# Patient Record
Sex: MALE | Race: WHITE | NOT HISPANIC OR LATINO | Employment: FULL TIME | URBAN - METROPOLITAN AREA
[De-identification: names, ages, dates, MRNs, and addresses within clinical notes are randomized per-mention and may not be internally consistent; named-entity substitution may affect disease eponyms.]

---

## 2017-05-18 ENCOUNTER — TRANSFERRED RECORDS (OUTPATIENT)
Dept: HEALTH INFORMATION MANAGEMENT | Facility: CLINIC | Age: 62
End: 2017-05-18

## 2017-05-18 ENCOUNTER — RECORDS - HEALTHEAST (OUTPATIENT)
Dept: LAB | Facility: CLINIC | Age: 62
End: 2017-05-18

## 2017-05-18 LAB
CHOLEST SERPL-MCNC: 199 MG/DL
FASTING STATUS PATIENT QL REPORTED: ABNORMAL
HDLC SERPL-MCNC: 40 MG/DL
LDLC SERPL CALC-MCNC: 125 MG/DL
PSA SERPL-MCNC: 1.1 NG/ML (ref 0–4.5)
TRIGL SERPL-MCNC: 170 MG/DL

## 2017-07-06 ENCOUNTER — AMBULATORY - HEALTHEAST (OUTPATIENT)
Dept: PULMONOLOGY | Facility: OTHER | Age: 62
End: 2017-07-06

## 2017-07-06 DIAGNOSIS — F17.210 CIGARETTE NICOTINE DEPENDENCE, UNCOMPLICATED: ICD-10-CM

## 2017-07-14 ENCOUNTER — HOSPITAL ENCOUNTER (OUTPATIENT)
Dept: CT IMAGING | Facility: HOSPITAL | Age: 62
Discharge: HOME | End: 2017-07-14
Attending: INTERNAL MEDICINE

## 2017-07-14 ENCOUNTER — OFFICE VISIT - HEALTHEAST (OUTPATIENT)
Dept: PULMONOLOGY | Facility: OTHER | Age: 62
End: 2017-07-14

## 2017-07-14 DIAGNOSIS — H92.13 EAR DRAINAGE, BILATERAL: ICD-10-CM

## 2017-07-14 DIAGNOSIS — F17.210 CIGARETTE NICOTINE DEPENDENCE, UNCOMPLICATED: ICD-10-CM

## 2017-07-14 DIAGNOSIS — R63.4 WEIGHT LOSS: ICD-10-CM

## 2017-07-14 DIAGNOSIS — Z72.0 TOBACCO USE: ICD-10-CM

## 2017-07-18 ENCOUNTER — COMMUNICATION - HEALTHEAST (OUTPATIENT)
Dept: INTENSIVE CARE | Facility: CLINIC | Age: 62
End: 2017-07-18

## 2017-11-16 ENCOUNTER — OFFICE VISIT (OUTPATIENT)
Dept: FAMILY MEDICINE | Facility: CLINIC | Age: 62
End: 2017-11-16

## 2017-11-16 VITALS
BODY MASS INDEX: 19.88 KG/M2 | WEIGHT: 112.2 LBS | HEART RATE: 55 BPM | DIASTOLIC BLOOD PRESSURE: 58 MMHG | SYSTOLIC BLOOD PRESSURE: 98 MMHG | HEIGHT: 63 IN | TEMPERATURE: 97.4 F | OXYGEN SATURATION: 100 %

## 2017-11-16 DIAGNOSIS — L03.113 CELLULITIS OF RIGHT UPPER EXTREMITY: Primary | ICD-10-CM
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
## 2017-11-16 DIAGNOSIS — R63.4 WEIGHT LOSS: ICD-10-CM

## 2017-11-16 PROBLEM — L03.119 CELLULITIS OF HAND: Status: ACTIVE | Noted: 2017-11-11
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
## 2017-11-16 RX ORDER — SIMVASTATIN 20 MG
20 TABLET ORAL DAILY
COMMUNITY
End: 2018-02-07

## 2017-11-16 RX ORDER — METRONIDAZOLE 500 MG/1
500 TABLET ORAL 3 TIMES DAILY
COMMUNITY
Start: 2017-11-12 | End: 2017-11-22

## 2017-11-16 RX ORDER — SULFAMETHOXAZOLE/TRIMETHOPRIM 800-160 MG
1 TABLET ORAL 2 TIMES DAILY
COMMUNITY
Start: 2017-11-12 | End: 2017-11-22

## 2017-11-16 NOTE — PROGRESS NOTES
HPI:       Zafar Spencer is a 62 year old male with a significant past medical history of hyperlipidemia, marijuana and tobacco abuse, pulmonary nodule, emphysema and weight loss who presents for the new concern(s) of    Hospital Follow: Cellulitis of the Hand  Seen on 11/11 and admitted to [facility] for cellulitis of the right hand after he was bitten by his cat.  Initially was started on IV Rocephin and Flagyl with significant improvement.  Was discharged within 24 hours and started on p.o. metronidazole and Bactrim DS.  Has been compliant with medication and noted significant improvement in his right hand in regards to swelling and erythema.  Noted that it still is mildly tender to pressure over the dorsal aspect of his hand but nowhere else.  Has a history of scratches from his cat but no other bites.  Received a tetanus shot in 2012 thus did not get booster during his admission.  Has another week's worth of medication for both his metronidazole and Bactrim.  Since discharge, denied any fevers, chills, new rashes, weakness, numbness, changes in urination or bowel movements.    Weight Loss  Alcon and his partner are concerned about persistent weight loss over the last year.  He went from 155lbs about 1 year ago and currently is 111lbs without trying to lose weight.  Did endorse frequent night sweats but denies any fevers, nausea, vomiting, lightheadedness, dizziness, abdominal pain, changes in urination or bowel movement.  He was previously told that he had lung cancer in the past but never got treatment.  Concern due to persistent weight loss and night sweats that this could be worsening of his cancer.  He did note that he does not eat very well and has been more active with his part-time job as a  at [email or web address] that he started about 1 year ago.  States that he eats a small breakfast, no lunch due to work, and an average dinner.  Does not normally snack throughout the day but no  "significant changes to his diet.  No history of other cancer and no cancer that runs in the family.         PMHX:     Family history significant for diabetes and heart disease in the mother. No cancer hx in the family.    Current Outpatient Prescriptions   Medication Sig Dispense Refill     sulfamethoxazole-trimethoprim (BACTRIM DS/SEPTRA DS) 800-160 MG per tablet Take 1 tablet by mouth 2 times daily       simvastatin (ZOCOR) 20 MG tablet Take 20 mg by mouth daily       metroNIDAZOLE (FLAGYL) 500 MG tablet Take 500 mg by mouth 3 times daily       Social History     Social History     Marital status:      Spouse name: N/A     Number of children: N/A     Years of education: N/A     Occupational History           Social History Main Topics     Smoking status: Smoker, Current Status Unknown     Packs/day: 0.25     Years: 52.00     Types: Cigarettes     Smokeless tobacco: Never Used     Alcohol use No     Drug use: 2.00 per week     Special: Marijuana     Sexual activity: Not on file     Other Topics Concern     Not on file     Social History Narrative     No narrative on file        Allergies   Allergen Reactions     Latex Rash     Penicillins Rash     No results found for this or any previous visit (from the past 24 hour(s)).         Review of Systems:   12 Point review system negative except as stated in HPI          Physical Exam:     Vitals:    11/16/17 1520   BP: 98/58   Pulse: 55   Temp: 97.4  F (36.3  C)   TempSrc: Oral   SpO2: 100%   Weight: 112 lb 3.2 oz (50.9 kg)   Height: 5' 3\" (160 cm)     Body mass index is 19.88 kg/(m^2).    GENERAL APPEARANCE: healthy, alert and no distress,  NECK: no adenopathy, no asymmetry, masses, or scars and thyroid normal to palpation  RESP: lungs clear to auscultation - no rales, rhonchi or wheezes  CV: regular rate and rhythm,  and no murmur, click,  rub or gallop  ABDOMEN: soft, nontender, without hepatosplenomegaly or masses  MS: extremities normal- no gross " deformities noted  NEURO: Normal strength and tone, sensory exam grossly normal, mentation appears intact and speech normal  PSYCH: mood and affect normal/bright  SKIN: See below        Assessment and Plan     1. Cellulitis of right upper extremity/Cat bite, subsequent encounter  Significant improvement since hospitalization.  - Complete therapy with Metronidazole 500mg TID  - Complete therapy with Bactrim DS 800mg BID  - Follow up as needed    2. Weight loss  Poor nutrition vs Lung Neoplasm. Has document lung cancer in Care Everywhere but upon review, was assessed by Pulmonary and likely more infectious source which has resolved. No changes when reassessed with CT imaging. Will Obtain records from [facility] to help provide more evidence.  - Obtained old records  - Instructed and provided information about eating sources of food to help with weight gain and to eat more frequent (eat lunch)  - Follow up in 1 week     Options for treatment and follow-up care were reviewed with the patient and/or guardian. Zafar Spencer and/or guardian engaged in the decision making process and verbalized understanding of the options discussed and agreed with the final plan.    Alessio Baker MD  [facility]  Pager: [phone]    Precepted today with: Shameka Seth MD

## 2017-11-16 NOTE — MR AVS SNAPSHOT
"              After Visit Summary   11/16/2017    Zafar Spencer    MRN: [id number]           Patient Information     Date Of Birth          1955        Visit Information        Provider Department      11/16/2017 3:00 PM Alessio Baker MD [facility]        Care Instructions    - Continue with Metronidazole (antibiotic)  - Continue with Bactrim (antibiotics)  - Follow up in 1 week  - Work on diet            Follow-ups after your visit        Follow-up notes from your care team     Return in about 1 week (around 11/23/2017).      Your next 10 appointments already scheduled     Dec 04, 2017 10:00 AM CST   Return Visit with Gui Gracia MD   [facility] ([facility])    [address] [place] [address]   [phone]              Who to contact     Please call your clinic at [phone] to:    Ask questions about your health    Make or cancel appointments    Discuss your medicines    Learn about your test results    Speak to your doctor   If you have compliments or concerns about an experience at your clinic, or if you wish to file a complaint, please contact [facility] Patient Relations at [phone] or email us at [email or web address]         Additional Information About Your Visit        Care EveryWhere ID     This is your Care EveryWhere ID. This could be used by other organizations to access your [place] medical records  [id number]        Your Vitals Were     Pulse Temperature Height Pulse Oximetry BMI (Body Mass Index)       55 97.4  F (36.3  C) (Oral) 5' 3\" (160 cm) 100% 19.88 kg/m2        Blood Pressure from Last 3 Encounters:   11/16/17 98/58    Weight from Last 3 Encounters:   11/16/17 112 lb 3.2 oz (50.9 kg)              Today, you had the following     No orders found for display       Primary Care Provider Office Phone # Fax #    Alessio Baker [phone]       [address] " [facility] [address]        Equal Access to Services     [facility] SYLVESTER : Hadii aad ku hadkaterinkelley Harrisjayceeali, watonida luqadaha, qaybta kaalmamarc irene. So [facility] [phone].    ATENCIÓN: Si habla español, tiene a akbar disposición servicios gratuitos de asistencia lingüística. Llame al [phone].    We comply with applicable federal civil rights laws and [place] laws. We do not discriminate on the basis of race, color, national origin, age, disability, sex, sexual orientation, or gender identity.            Thank you!     Thank you for choosing [facility]  for your care. Our goal is always to provide you with excellent care. Hearing back from our patients is one way we can continue to improve our services. Please take a few minutes to complete the written survey that you may receive in the mail after your visit with us. Thank you!             Your Updated Medication List - Protect others around you: Learn how to safely use, store and throw away your medicines at www.disposemymeds.org.          This list is accurate as of: 11/16/17  5:37 PM.  Always use your most recent med list.                   Brand Name Dispense Instructions for use Diagnosis    metroNIDAZOLE 500 MG tablet    FLAGYL     Take 500 mg by mouth 3 times daily        simvastatin 20 MG tablet    ZOCOR     Take 20 mg by mouth daily        sulfamethoxazole-trimethoprim 800-160 MG per tablet    BACTRIM DS/SEPTRA DS     Take 1 tablet by mouth 2 times daily

## 2017-11-16 NOTE — PATIENT INSTRUCTIONS
- Continue with Metronidazole (antibiotic)  - Continue with Bactrim (antibiotics)  - Follow up in 1 week  - Work on diet

## 2017-11-21 ENCOUNTER — CARE COORDINATION (OUTPATIENT)
Dept: FAMILY MEDICINE | Facility: CLINIC | Age: 62
End: 2017-11-21

## 2017-11-21 NOTE — PROGRESS NOTES
Pt was wondering about results of bloodwork, however I don't see any having been done is that correct?  Routing to PCP    Pt is scheduled to see Geronimo on Dec 4th    [email or web address]

## 2017-11-27 NOTE — PROGRESS NOTES
Preceptor Attestation:  Patient's case reviewed and discussed with Alessio Baker MD. Patient seen and discussed with the resident.  I agree with assessment and plan of care.  Supervising Physician:  Shameka Seth MD  [facility]

## 2017-12-04 ENCOUNTER — OFFICE VISIT (OUTPATIENT)
Dept: FAMILY MEDICINE | Facility: CLINIC | Age: 62
End: 2017-12-04

## 2017-12-04 VITALS
HEART RATE: 59 BPM | HEIGHT: 63 IN | SYSTOLIC BLOOD PRESSURE: 90 MMHG | DIASTOLIC BLOOD PRESSURE: 60 MMHG | RESPIRATION RATE: 18 BRPM | OXYGEN SATURATION: 96 % | TEMPERATURE: 97.7 F | BODY MASS INDEX: 20.2 KG/M2 | WEIGHT: 114 LBS

## 2017-12-04 DIAGNOSIS — R39.14 BENIGN PROSTATIC HYPERPLASIA WITH INCOMPLETE BLADDER EMPTYING: Primary | ICD-10-CM
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
## 2017-12-04 DIAGNOSIS — N40.1 BENIGN PROSTATIC HYPERPLASIA WITH INCOMPLETE BLADDER EMPTYING: Primary | ICD-10-CM

## 2017-12-04 LAB
BILIRUBIN UR: NEGATIVE
BLOOD UR: NEGATIVE
GLUCOSE URINE: NEGATIVE
KETONES UR QL: NEGATIVE
LEUKOCYTE ESTERASE UR: NEGATIVE
NITRITE UR QL STRIP: NEGATIVE
PH UR STRIP: 7 [PH] (ref 5–7)
PROTEIN UR: NEGATIVE
SP GR UR STRIP: 1.02
UROBILINOGEN UR STRIP-ACNC: NORMAL

## 2017-12-04 RX ORDER — TAMSULOSIN HYDROCHLORIDE 0.4 MG/1
0.4 CAPSULE ORAL DAILY
Qty: 30 CAPSULE | Refills: 1 | Status: SHIPPED | OUTPATIENT
Start: 2017-12-04 | End: 2018-02-07

## 2017-12-04 NOTE — PROGRESS NOTES
Preceptor Attestation:  Patient's case reviewed and discussed with Gui Gracia MD Patient seen and discussed with the resident.. I agree with assessment and plan of care.  Supervising Physician:  Nigel Edward MD  [facility]

## 2017-12-04 NOTE — MR AVS SNAPSHOT
After Visit Summary   12/4/2017    Zafar Spencer    MRN: [id number]           Patient Information     Date Of Birth          1955        Visit Information        Provider Department      12/4/2017 10:00 AM Gui Gracia MD [facility]        Today's Diagnoses     Benign prostatic hyperplasia with incomplete bladder emptying    -  1      Care Instructions    Important Takeaway Points From This Visit:    Please see us again in 2-3 weeks to discuss your urinary symptoms    I have prescribed you Flomax to be taken once per day    We will call with your lab results      As always, please call with any questions or concerns. I look forward to seeing you again soon!    Take care,  Dr. Gracia    Your current medication list is printed. Please keep this with you - it is helpful to bring this current list to any other medical appointments. It can also be helpful if you ever go to the emergency room or hospital.    If you had lab testing today we will call you with the results. The phone number we will call with your results is # [phone] (home) . If this is not the best number please call our clinic and change the number.    If you need any refills, please call your pharmacy and they will contact us.    If you have any further concerns or wish to schedule another appointment, please call our office at [phone] during normal business hours (8-5, M-F).    If you have urgent medical questions that cannot wait, you may call [phone] at any time of day.    If you have a medical emergency, please call [phone].    Thank you for coming to [facility].              Follow-ups after your visit        Who to contact     Please call your clinic at [phone] to:    Ask questions about your health    Make or cancel appointments    Discuss your medicines    Learn about your test results    Speak to your doctor   If you have compliments or concerns about an experience at your  "clinic, or if you wish to file a complaint, please contact [facility] Physicians Patient Relations at [phone] or email us at [email or web address]         Additional Information About Your Visit        Care EveryWhere ID     This is your Care EveryWhere ID. This could be used by other organizations to access your [place] medical records  [id number]        Your Vitals Were     Pulse Temperature Respirations Height Pulse Oximetry BMI (Body Mass Index)    59 97.7  F (36.5  C) (Oral) 18 5' 3\" (160 cm) 96% 20.19 kg/m2       Blood Pressure from Last 3 Encounters:   12/04/17 90/60   11/16/17 98/58    Weight from Last 3 Encounters:   12/04/17 114 lb (51.7 kg)   11/16/17 112 lb 3.2 oz (50.9 kg)              We Performed the Following     Urinalysis([facility])          Today's Medication Changes          These changes are accurate as of: 12/4/17 10:52 AM.  If you have any questions, ask your nurse or doctor.               Start taking these medicines.        Dose/Directions    tamsulosin 0.4 MG capsule   Commonly known as:  FLOMAX   Used for:  Benign prostatic hyperplasia with incomplete bladder emptying   Started by:  Gui Gracia MD        Dose:  0.4 mg   Take 1 capsule (0.4 mg) by mouth daily   Quantity:  30 capsule   Refills:  1            Where to get your medicines      These medications were sent to [place] [address] [place] [address] [address]     Phone:  [phone]     tamsulosin 0.4 MG capsule                Primary Care Provider Office Phone # Fax #    Alessio Baker [phone]       [address] [address]        Equal Access to Services     LILIAN PHAN AH: Hadii amanda kennedyo Sochu, waaxda luqadaha, qaybta kaalmada adeegyada, marc land. So [facility] [phone].    ATENCIÓN: Si habla español, tiene a akbar disposición servicios " sophei de asistencia lingüística. Mal ferrell [phone].    We comply with applicable federal civil rights laws and [place] laws. We do not discriminate on the basis of race, color, national origin, age, disability, sex, sexual orientation, or gender identity.            Thank you!     Thank you for choosing [facility]  for your care. Our goal is always to provide you with excellent care. Hearing back from our patients is one way we can continue to improve our services. Please take a few minutes to complete the written survey that you may receive in the mail after your visit with us. Thank you!             Your Updated Medication List - Protect others around you: Learn how to safely use, store and throw away your medicines at www.disposemymeds.org.          This list is accurate as of: 12/4/17 10:52 AM.  Always use your most recent med list.                   Brand Name Dispense Instructions for use Diagnosis    simvastatin 20 MG tablet    ZOCOR     Take 20 mg by mouth daily        tamsulosin 0.4 MG capsule    FLOMAX    30 capsule    Take 1 capsule (0.4 mg) by mouth daily    Benign prostatic hyperplasia with incomplete bladder emptying

## 2017-12-04 NOTE — PATIENT INSTRUCTIONS
Important Takeaway Points From This Visit:    Please see us again in 2-3 weeks to discuss your urinary symptoms    I have prescribed you Flomax to be taken once per day    We will call with your lab results      As always, please call with any questions or concerns. I look forward to seeing you again soon!    Take care,  Dr. Gracia    Your current medication list is printed. Please keep this with you - it is helpful to bring this current list to any other medical appointments. It can also be helpful if you ever go to the emergency room or hospital.    If you had lab testing today we will call you with the results. The phone number we will call with your results is # [phone] (home) . If this is not the best number please call our clinic and change the number.    If you need any refills, please call your pharmacy and they will contact us.    If you have any further concerns or wish to schedule another appointment, please call our office at [phone] during normal business hours (8-5, M-F).    If you have urgent medical questions that cannot wait, you may call [phone] at any time of day.    If you have a medical emergency, please call [phone].    Thank you for coming to [facility].

## 2017-12-04 NOTE — PROGRESS NOTES
HPI:   Zafar Spencer is a 62 year old  male who presents to clinic today for follow-up of his cat bite of the right hand.  Patient denies symptoms of fever, chills, erythema, hand pain.  Patient has finished his antibiotic course and believes it is healed very well.    Additionally patient and long-term girlfriend would like to discuss his prostate.  He is having increased urinary frequency approximately every 2-3 hours, especially at night.  He believes he is unable to fully empty his bladder.  Additionally, he states that after 10-20 minutes of having the urge to urinate if he has not gone to use the restroom patient will have incontinence.  Patient has not had any difficulty initiating a stream.  Patient has not noticed any hematuria or dysuria.    Patient denies other symptoms.    Patient is an established patient of this clinic.         PMHX:   Active Problems List  Patient Active Problem List   Diagnosis     Tobacco use     Pulmonary nodules     Benign prostatic hyperplasia with urinary frequency     Active problem list reviewed and updated.    Current Medications  Current Outpatient Prescriptions   Medication Sig Dispense Refill     tamsulosin (FLOMAX) 0.4 MG capsule Take 1 capsule (0.4 mg) by mouth daily 30 capsule 1     simvastatin (ZOCOR) 20 MG tablet Take 20 mg by mouth daily       Medication list reviewed and updated.    Social History  Social History   Substance Use Topics     Smoking status: Smoker, Current     Packs/day: 0.25     Years: 52.00     Types: Cigarettes     Smokeless tobacco: Never Used     Alcohol use No     History   Drug Use     2.00 per week     Special: Marijuana     Allergies  Allergies   Allergen Reactions     Latex Rash     Penicillins Rash     Allergies and Medication Intolerances Updated         Physical Exam:     Vitals:    12/04/17 0959   BP: 90/60   BP Location: Right arm   Patient Position: Sitting   Cuff Size: Adult Regular   Pulse: 59   Resp: 18   Temp: 97.7  F (36.5  " C)   TempSrc: Oral   SpO2: 96%   Weight: 114 lb (51.7 kg)   Height: 5' 3\" (160 cm)     Body mass index is 20.19 kg/(m^2).    General: Thin male. Accompanied by girlfriend. Appears well and in no acute distress.  HEENT: Eyes grossly normal to inspection. Extraocular movements intact. Pupils equal, round, and reactive to light. Mucous membranes moist. No ulcers or lesions noted in the oropharynx.  Cardiovascular: Regular rate and rhythm, normal S1 and S2 without murmur. No extra heartsounds or friction rub. Radial pulses present and equal bilaterally.  Respiratory: Lungs clear to auscultation bilaterally. No wheezing or crackles. No prolonged expiration. Symmetrical chest rise.  Musculoskeletal: No gross extremity deformities. No peripheral edema. Normal muscle bulk. Right hand specially looks improved from previous without erythema, swelling, and is symmetrical to left hand.  GI/Rectal: Soft, non-tender abdomen. No hepatosplenomegaly. Normal active bowel sounds. No obvious external hemorrhoids. Prostate slightly enlarged, non-tender, and without palpable nodules.    Results for CLARENCE PAULINO DIANA (MRN [id number]) as of 12/6/2017 03:15   Ref. Range 12/4/2017 11:23   Glucose Urine Latest Ref Range: NEGATIVE  Negative   Bilirubin UR Latest Ref Range: NEGATIVE  Negative   Ketones Urine Latest Ref Range: NEGATIVE  Negative   Specific Gravity Urine Latest Ref Range: 1.005 - 1.030  1.020   pH Urine Latest Ref Range: 4.5 - 8.0  7.0   Protein UR Latest Ref Range: NEGATIVE  Negative   Urobilinogen mg/dL Latest Ref Range: 0.2 E.U./dL  0.2 E.U./dL   Nitrite Urine Latest Ref Range: NEGATIVE  Negative   Blood UR Latest Ref Range: NEGATIVE  Negative   Leukocyte Esterase UR Latest Ref Range: NEGATIVE  Negative       Assessment and Plan   1. Benign prostatic hyperplasia with incomplete bladder emptying: Risk factors for prostate cancer include smoking, age, and male sex. UA unremarkable. No palpable nodules on exam. Trial flomax and " follow-up in 2-3 weeks. Patient's blood pressure noted to be slightly low. Recommended light activity until he has tried the medication.  - tamsulosin (FLOMAX) 0.4 MG capsule; Take 1 capsule (0.4 mg) by mouth daily  Dispense: 30 capsule; Refill: 1  - Urinalysis([facility])    2. Cat bite, subsequent encounter: Finished antibiotic course. Appears resolved. Removed from problems list.    Options for treatment and follow-up care were reviewed with the patient and/or guardian. Zafar Spencer and/or guardian engaged in the decision making process and verbalized understanding of the options discussed and agreed with the final plan.    Gui Gracia MD  [facility]  Pager# [phone]    Precepted with: Nigel Edward MD      This chart is completed utilizing dictation software; typos and/or incorrect word substitutions may unintentionally occur.

## 2017-12-06 PROBLEM — N40.1 BENIGN PROSTATIC HYPERPLASIA WITH URINARY FREQUENCY: Status: ACTIVE | Noted: 2017-12-06
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
## 2017-12-06 PROBLEM — R35.0 BENIGN PROSTATIC HYPERPLASIA WITH URINARY FREQUENCY: Status: ACTIVE | Noted: 2017-12-06

## 2017-12-06 PROBLEM — R63.4 WEIGHT LOSS: Status: RESOLVED | Noted: 2017-07-14 | Resolved: 2017-12-06

## 2017-12-06 PROBLEM — L03.119 CELLULITIS OF HAND: Status: RESOLVED | Noted: 2017-11-11 | Resolved: 2017-12-06

## 2018-01-10 ENCOUNTER — OFFICE VISIT (OUTPATIENT)
Dept: FAMILY MEDICINE | Facility: CLINIC | Age: 63
End: 2018-01-10
Payer: COMMERCIAL

## 2018-01-10 VITALS
SYSTOLIC BLOOD PRESSURE: 90 MMHG | BODY MASS INDEX: 21.99 KG/M2 | HEIGHT: 60 IN | HEART RATE: 70 BPM | RESPIRATION RATE: 18 BRPM | DIASTOLIC BLOOD PRESSURE: 66 MMHG | OXYGEN SATURATION: 93 % | WEIGHT: 112 LBS | TEMPERATURE: 97.6 F

## 2018-01-10 DIAGNOSIS — J43.9 PULMONARY EMPHYSEMA, UNSPECIFIED EMPHYSEMA TYPE (H): ICD-10-CM

## 2018-01-10 DIAGNOSIS — R05.9 COUGH: Primary | ICD-10-CM

## 2018-01-10 RX ORDER — BENZONATATE 100 MG/1
100 CAPSULE ORAL 3 TIMES DAILY PRN
Qty: 30 CAPSULE | Refills: 1 | Status: SHIPPED | OUTPATIENT
Start: 2018-01-10 | End: 2018-07-13

## 2018-01-10 NOTE — PATIENT INSTRUCTIONS
Discharge Instructions for Emphysema  You have been diagnosed with emphysema. This is a lung disease that limits the flow of air in and out of your lungs, making breathing harder. Emphysema is most often caused by heavy, long-time cigarette smoking.  Home care    If you smoke, quit.    Join a stop-smoking program. There are even telephone, text message, and Internet programs.    Ask your healthcare provider about medicines or other methods to help you quit.    Ask family members to quit smoking as well.    Don t allow smoking in your home, in your car, or around you, especially if oxygen is in use.    Protect yourself from infection.    Wash your hands often. Keep your hands away from your face. Most germs are spread from your hands to your mouth.    Ask your provider about a yearly flu shot and pneumonia vaccines.    Avoid crowds, especially in the winter, when more people have colds and flu.    To stay healthy, exercise regularly, eat a balanced diet, and get enough sleep. You should:    Try to exercise at least 30 minutes on most days. Ask your healthcare provider about a pulmonary rehabilitation (rehab) program. Pulmonary rehab helps improve muscle strength, ability to exercise and do daily tasks.     Healthy eating means a lot of fruit and vegetables, 100% whole grain products, lean meats and fish, and low-fat dairy products, like yogurt and cheeses.    Most people need 8 hours of sleep every night.    Take your medicines exactly as directed. Don t skip doses.    If you use oxygen, make sure you use it correctly. That means the amount your use and the length of time you use it.    Try to stay away from those things that may affect your breathing, such as cold weather, high humidity, smoke, air pollution, dust, and allergens.    Unless your provider has told you otherwise, drink at least 8 glasses of fluid every day to keep mucus thin. Ask about other things that can help.    Ask your healthcare provider to  show you pursed-lip breathing to help decrease shortness of breath.  Follow-up care  Make all follow-up appointments as directed by our staff.   When to call your healthcare provider  Call your provider right away if you have any of the following:    Shortness of breath, wheezing, or coughing, especially if you have trouble catching your breath or talking    Increased mucus; yellow, green, bloody, or smelly mucus    Fever or chills    Tightness in your chest that does not go away with your normal medicines    An irregular heartbeat or a feeling that your heart is beating very fast    Swollen ankles    Trouble doing your usual activities   Date Last Reviewed: 5/1/2016 2000-2017 [employer]. [address] [place] [address], [place] [address]. All rights reserved. This information is not intended as a substitute for professional medical care. Always follow your healthcare professional's instructions.

## 2018-01-10 NOTE — MR AVS SNAPSHOT
"              After Visit Summary   1/10/2018    Zafar Spencer    MRN: [id number]           Patient Information     Date Of Birth          1955        Visit Information        Provider Department      1/10/2018 10:00 AM Erika Becker DO [facility]        Today's Diagnoses     Cough    -  1    Pulmonary emphysema, unspecified emphysema type (H)           Follow-ups after your visit        Who to contact     Please call your clinic at [phone] to:    Ask questions about your health    Make or cancel appointments    Discuss your medicines    Learn about your test results    Speak to your doctor   If you have compliments or concerns about an experience at your clinic, or if you wish to file a complaint, please contact [facility] Patient Relations at [phone] or email us at [email or web address]         Additional Information About Your Visit        Care EveryWhere ID     This is your Care EveryWhere ID. This could be used by other organizations to access your [place] medical records  [id number]        Your Vitals Were     Pulse Temperature Respirations Height Pulse Oximetry BMI (Body Mass Index)    70 97.6  F (36.4  C) (Oral) 18 4' 10.2\" (147.8 cm) 93% 23.25 kg/m2       Blood Pressure from Last 3 Encounters:   01/10/18 90/66   12/04/17 90/60   11/16/17 98/58    Weight from Last 3 Encounters:   01/10/18 112 lb (50.8 kg)   12/04/17 114 lb (51.7 kg)   11/16/17 112 lb 3.2 oz (50.9 kg)              We Performed the Following     XR CHEST 2 VW          Today's Medication Changes          These changes are accurate as of: 1/10/18 10:28 AM.  If you have any questions, ask your nurse or doctor.               Start taking these medicines.        Dose/Directions    benzonatate 100 MG capsule   Commonly known as:  TESSALON   Used for:  Cough   Started by:  Erika Becker DO        Dose:  100 mg   Take 1 capsule (100 mg) by mouth 3 times daily as needed for cough "   Quantity:  30 capsule   Refills:  1            Where to get your medicines      These medications were sent to [facility] [address] [facility] - [address] [address]     Phone:  [phone]     benzonatate 100 MG capsule                Primary Care Provider Office Phone # Fax #    Alessio Migue Baker [phone]       [address] [address]        Equal Access to Services     LILIAN PHAN : Hadii aad ku hadasho Soomaali, waaxda luqadaha, qaybta kaalmada adeegyada, waxay idiin hayaan adeeg [place]. So [facility] [phone].    ATENCIÓN: Si habla español, tiene a akbar disposición servicios gratuitos de asistencia lingüística. [facility] [phone].    We comply with applicable federal civil rights laws and [place] laws. We do not discriminate on the basis of race, color, national origin, age, disability, sex, sexual orientation, or gender identity.            Thank you!     Thank you for choosing [facility]  for your care. Our goal is always to provide you with excellent care. Hearing back from our patients is one way we can continue to improve our services. Please take a few minutes to complete the written survey that you may receive in the mail after your visit with us. Thank you!             Your Updated Medication List - Protect others around you: Learn how to safely use, store and throw away your medicines at www.disposemymeds.org.          This list is accurate as of: 1/10/18 10:28 AM.  Always use your most recent med list.                   Brand Name Dispense Instructions for use Diagnosis    benzonatate 100 MG capsule    TESSALON    30 capsule    Take 1 capsule (100 mg) by mouth 3 times daily as needed for cough    Cough       simvastatin 20 MG tablet    ZOCOR     Take 20 mg by mouth daily        tamsulosin 0.4 MG capsule    FLOMAX    30 capsule    Take 1 capsule (0.4 mg) by mouth daily    Benign  prostatic hyperplasia with incomplete bladder emptying

## 2018-01-10 NOTE — PROGRESS NOTES
HPI:       Zafar Spencer is a 62 year old  male with a significant past medical history of chronic obstructive pulmonary disease who presents for the new concern(s) of    1. Cough with some productive sputum, 3 weeks of coughing,  he is a  smoker, there is some subjective weight loss, doesn't always eat well, sometimes does not have time to eat at work, no fevers, no chest pain, minimal shortness of breath    He did have a low-dose CT scan 7/2017 due to his smoking history and showed his emphysema    2. Urinary frequency: feels his symptoms have improved since starting Tamsulosin, he has been taking the medication daily with no adverse side effects. His blood pressure has been running lower, but this is normal fo rhim. He has no lightheadedness           PMHX:     Patient Active Problem List   Diagnosis     Tobacco use     Pulmonary nodules     Benign prostatic hyperplasia with urinary frequency       Current Outpatient Prescriptions   Medication Sig Dispense Refill     tamsulosin (FLOMAX) 0.4 MG capsule Take 1 capsule (0.4 mg) by mouth daily 30 capsule 1     simvastatin (ZOCOR) 20 MG tablet Take 20 mg by mouth daily         Social History     Social History     Marital status:      Spouse name: N/A     Number of children: N/A     Years of education: N/A     Occupational History           Social History Main Topics     Smoking status: Smoker, Current Status Unknown     Packs/day: 0.25     Years: 52.00     Types: Cigarettes     Smokeless tobacco: Never Used     Alcohol use No     Drug use: 2.00 per week     Special: Marijuana     Sexual activity: Not on file     Other Topics Concern     Not on file     Social History Narrative          Allergies   Allergen Reactions     Latex Rash     Penicillins Rash       No results found for this or any previous visit (from the past 24 hour(s)).         Review of Systems:   C: NEGATIVE for fatigue, but does feel may have had some weight loss  E: NEGATIVE  "for acute vision problems or changes  CV: NEGATIVE for chest pain, palpitations or new or worsening peripheral edema  P: NEGATIVE for changes in mood or affect          Physical Exam:     Vitals:    01/10/18 0952   BP: 90/66   Pulse: 70   Resp: 18   Temp: 97.6  F (36.4  C)   TempSrc: Oral   SpO2: 93%   Weight: 112 lb (50.8 kg)   Height: 4' 10.2\" (147.8 cm)     Body mass index is 23.25 kg/(m^2).    GENERAL APPEARANCE: healthy, alert and no distress,  HENT: ear canals and TM's normal and nose and mouth without ulcers or lesions  NECK: no adenopathy, no asymmetry, masses, or scars and thyroid normal to palpation  RESP: lungs clear to auscultation - no rales, rhonchi or wheezes  CV: regular rate and rhythm,  and no murmur, click,  rub or gallop  MS: extremities normal- no gross deformities noted      Assessment and Plan     1. Cough  No evidence of pneumonia, would be reasonable to go to work today  - XR CHEST 2 VW  - benzonatate (TESSALON) 100 MG capsule; Take 1 capsule (100 mg) by mouth 3 times daily as needed for cough  Dispense: 30 capsule; Refill: 1    2. Pulmonary emphysema, unspecified emphysema type (H)  Discussed quitting smoking        Options for treatment and follow-up care were reviewed with the patient and/or guardian. Zafar Spencer and/or guardian engaged in the decision making process and verbalized understanding of the options discussed and agreed with the final plan.    Erika Becker, DO          "

## 2018-02-07 DIAGNOSIS — N40.1 BENIGN PROSTATIC HYPERPLASIA WITH INCOMPLETE BLADDER EMPTYING: ICD-10-CM

## 2018-02-07 DIAGNOSIS — R39.14 BENIGN PROSTATIC HYPERPLASIA WITH INCOMPLETE BLADDER EMPTYING: ICD-10-CM

## 2018-02-07 DIAGNOSIS — Z00.00 HEALTHCARE MAINTENANCE: Primary | ICD-10-CM

## 2018-02-07 RX ORDER — TAMSULOSIN HYDROCHLORIDE 0.4 MG/1
0.4 CAPSULE ORAL DAILY
Qty: 90 CAPSULE | Refills: 3 | Status: SHIPPED | OUTPATIENT
Start: 2018-02-07 | End: 2018-07-13

## 2018-02-07 RX ORDER — SIMVASTATIN 20 MG
20 TABLET ORAL DAILY
Qty: 90 TABLET | Refills: 3 | Status: SHIPPED | OUTPATIENT
Start: 2018-02-07 | End: 2018-07-19

## 2018-04-26 ENCOUNTER — OFFICE VISIT (OUTPATIENT)
Dept: FAMILY MEDICINE | Facility: CLINIC | Age: 63
End: 2018-04-26
Payer: COMMERCIAL

## 2018-04-26 VITALS
OXYGEN SATURATION: 97 % | SYSTOLIC BLOOD PRESSURE: 100 MMHG | WEIGHT: 117.2 LBS | BODY MASS INDEX: 24.33 KG/M2 | HEART RATE: 57 BPM | TEMPERATURE: 97.8 F | DIASTOLIC BLOOD PRESSURE: 62 MMHG | RESPIRATION RATE: 16 BRPM

## 2018-04-26 DIAGNOSIS — J20.9 ACUTE BRONCHITIS, UNSPECIFIED ORGANISM: Primary | ICD-10-CM

## 2018-04-26 NOTE — MR AVS SNAPSHOT
After Visit Summary   4/26/2018    Zafar Spencer    MRN: [id number]           Patient Information     Date Of Birth          1955        Visit Information        Provider Department      4/26/2018 3:40 PM Palla, Misbah Yousuf, MD [facility]        Today's Diagnoses     Acute bronchitis, unspecified organism    -  1       Follow-ups after your visit        Who to contact     Please call your clinic at [phone] to:    Ask questions about your health    Make or cancel appointments    Discuss your medicines    Learn about your test results    Speak to your doctor            Additional Information About Your Visit        Care EveryWhere ID     This is your Care EveryWhere ID. This could be used by other organizations to access your [place] medical records  [id number]        Your Vitals Were     Pulse Temperature Respirations Pulse Oximetry BMI (Body Mass Index)       57 97.8  F (36.6  C) (Oral) 16 97% 24.33 kg/m2        Blood Pressure from Last 3 Encounters:   04/26/18 100/62   01/10/18 90/66   12/04/17 90/60    Weight from Last 3 Encounters:   04/26/18 117 lb 3.2 oz (53.2 kg)   01/10/18 112 lb (50.8 kg)   12/04/17 114 lb (51.7 kg)              Today, you had the following     No orders found for display       Primary Care Provider Office Phone # Fax #    Alessio Migue Baker [phone]       [place] [id number]        Equal Access to Services     LILIAN PHAN AH: Hadii aad ku hadasho Sojayceeali, waaxda luqadaha, qaybta kaalmada adeegyada, [place]. So [facility] [phone].    ATENCIÓN: Si habla español, tiene a akbar disposición servicios gratuitos de asistencia lingüística. Llame al [phone].    We comply with applicable federal civil rights laws and [place] laws. We do not discriminate on the basis of race, color, national origin, age, disability, sex, sexual orientation, or gender identity.             Thank you!     Thank you for choosing [facility]  for your care. Our goal is always to provide you with excellent care. Hearing back from our patients is one way we can continue to improve our services. Please take a few minutes to complete the written survey that you may receive in the mail after your visit with us. Thank you!             Your Updated Medication List - Protect others around you: Learn how to safely use, store and throw away your medicines at www.disposemymeds.org.          This list is accurate as of 4/26/18 11:59 PM.  Always use your most recent med list.                   Brand Name Dispense Instructions for use Diagnosis    benzonatate 100 MG capsule    TESSALON    30 capsule    Take 1 capsule (100 mg) by mouth 3 times daily as needed for cough    Cough       omeprazole 20 MG CR capsule    priLOSEC     Take 20 mg by mouth daily        simvastatin 20 MG tablet    ZOCOR    90 tablet    Take 1 tablet (20 mg) by mouth daily    Healthcare maintenance       tamsulosin 0.4 MG capsule    FLOMAX    90 capsule    Take 1 capsule (0.4 mg) by mouth daily    Benign prostatic hyperplasia with incomplete bladder emptying

## 2018-05-03 NOTE — PROGRESS NOTES
HPI    Zafar Spencer is 62 year old yo male presenting for:    1. Follow up on bronchitis  - was diagnosed with bronchitis 2 months ago  - today:   - symptoms resolved   - no cough, no sputum production, no chest congestion   - no fevers/chills/sweats   - no shortness of breath    OBJECTIVE    Vitals  /62  Pulse 57  Temp 97.8  F (36.6  C) (Oral)  Resp 16  Wt 117 lb 3.2 oz (53.2 kg)  SpO2 97%  BMI 24.33 kg/m2      Physical Exam  General: No acute distress  Ears: canals patent, TM wnl  CV: RRR  Respiratory: CTA bilaterally, no wheezes/rhonchi/rhales appreciated, no respiratory distress  Abdomen: soft, non-distended, non-tender, normoactive bowel sounds  Neuro: no focal deficits noted, reflexes within normal limits  Psych: appropriate affect    ASSESSMENT/PLAN    # Bronchitis  - resolved  - continue routine cares    # Pulmonary nodules  - CT from 2017 reviewed  - advised patient that his next scan is due 7/2018        Precepted with Dr. Ricardo     Preceptor Attestation:  I saw and evaluated the patient.  I reviewed the resident physician's history, exam, and treatment plan; and I agree with the documentation by the resident physician.  Supervising Physician:  Cesar Ricardo MD

## 2018-06-19 ENCOUNTER — COMMUNICATION - HEALTHEAST (OUTPATIENT)
Dept: PULMONOLOGY | Facility: OTHER | Age: 63
End: 2018-06-19

## 2018-06-21 ENCOUNTER — COMMUNICATION - HEALTHEAST (OUTPATIENT)
Dept: PULMONOLOGY | Facility: OTHER | Age: 63
End: 2018-06-21

## 2018-06-27 ENCOUNTER — TELEPHONE (OUTPATIENT)
Dept: FAMILY MEDICINE | Facility: CLINIC | Age: 63
End: 2018-06-27

## 2018-06-27 NOTE — TELEPHONE ENCOUNTER
Attempted to reach patient to follow up from recent ED visit for abdominal pain and emesis. LM for RC

## 2018-06-29 NOTE — TELEPHONE ENCOUNTER
Attempted to reach patient. Wife answered and states that he is not available but that he is feeling back to normal. Encouraged an appointment, they will call back to schedule.

## 2018-07-13 ENCOUNTER — OFFICE VISIT (OUTPATIENT)
Dept: FAMILY MEDICINE | Facility: CLINIC | Age: 63
End: 2018-07-13
Payer: COMMERCIAL

## 2018-07-13 VITALS
TEMPERATURE: 97.7 F | RESPIRATION RATE: 16 BRPM | WEIGHT: 112 LBS | SYSTOLIC BLOOD PRESSURE: 97 MMHG | HEART RATE: 52 BPM | DIASTOLIC BLOOD PRESSURE: 60 MMHG | BODY MASS INDEX: 19.84 KG/M2 | HEIGHT: 63 IN | OXYGEN SATURATION: 98 %

## 2018-07-13 DIAGNOSIS — Z12.12 SCREENING FOR MALIGNANT NEOPLASM OF THE RECTUM: ICD-10-CM

## 2018-07-13 DIAGNOSIS — J43.9 PULMONARY EMPHYSEMA, UNSPECIFIED EMPHYSEMA TYPE (H): Primary | ICD-10-CM

## 2018-07-13 DIAGNOSIS — Z11.4 SCREENING FOR HUMAN IMMUNODEFICIENCY VIRUS: ICD-10-CM

## 2018-07-13 DIAGNOSIS — Z72.0 TOBACCO USE: ICD-10-CM

## 2018-07-13 DIAGNOSIS — Z87.891 PERSONAL HISTORY OF TOBACCO USE: ICD-10-CM

## 2018-07-13 DIAGNOSIS — Z11.59 NEED FOR HEPATITIS C SCREENING TEST: ICD-10-CM

## 2018-07-13 DIAGNOSIS — H90.3 BILATERAL SENSORINEURAL HEARING LOSS: ICD-10-CM

## 2018-07-13 LAB
CHOLEST SERPL-MCNC: 199 MG/DL
FASTING?: NO
HDLC SERPL-MCNC: 48 MG/DL
HIV 1+2 AB+HIV1 P24 AG SERPL QL IA: NEGATIVE
LDLC SERPL CALC-MCNC: 132 MG/DL
TRIGL SERPL-MCNC: 95 MG/DL

## 2018-07-13 NOTE — PROGRESS NOTES
Preceptor Attestation:  Patient's case reviewed and discussed with  Patient seen and discussed with the resident..  I agree with written assessment and plan of care.  Supervising Physician:  Cheryl Jung MD  [facility]

## 2018-07-13 NOTE — PROGRESS NOTES
"History   Zafar Spencer is a 63 year old male presenting for:  RECHECK (bronchitis) and Medication Reconciliation (needs attention)    Bronchitis  - Cough is well controlled, no coughing since last visit  - Was rx'd Tessalon but no longer taking it.   - No trouble breathing or SOB. Some night-time wheezing reported from wife  - 1/2 ppd, THC use 3-4 times a week.  - CT scan every year. Due for one now    Tobacco Use Disorder  - Currently smoking 1/2 ppd  - Interested in quitting eventually, has tried patch, gum and Chantix. Did not like patch/gum, but reports Chantix worked well; later he reports it caused vomiting      Ear Infection  - Patient has had consistent ringing in ears and hearing loss over the past year. Patient reports intermittent numbness and \"ear swelling\".   - Patient's partner also reports brownish/green/yellow drainage that is apparent on pillow after night of sleep  - Ear is itchy, uses Q-tips and fingers to scratch  - Told he has dermatitis of ears, but was not given anything for it.   - Patient reports multiple ear infections as a child, did not have tubes placed    BPH  - Previously diagnosed with BPH, was prescribed Flomax   - No longer taking Flomax because it was causing lower back pain  - Patient denies any change in urine frequency, no urgency, no dysuria, no back pain    The patient speaks English, so no  was used for this visit.   Medical and social history and medications reviewed with patient.   Exam   BP 97/60 (BP Location: Right arm, Patient Position: Chair, Cuff Size: Adult Regular)  Pulse 52  Temp 97.7  F (36.5  C) (Oral)  Resp 16  Ht 5' 2.89\" (159.8 cm)  Wt 112 lb (50.8 kg)  SpO2 98%  BMI 19.91 kg/m2  Gen: NAD, slow somewhat slurred speech  HEENT: Missing many upper teeth; poor dental hygiene. Unable to visualize tympanic membranes due to uncommon middle ear anatomy. Grijalva test did not lateralize. Decreased Air conduction in both ears. Rinne test much louder bone " conduction than air conduction bilaterally  Card: RRR, no murmurs  Resp: clear, no wheezing or crackles  Medical Decision-Making     Pulmonary emphysema  A: Patient's symptoms have abated since last visit. Has had no cough, SOB or difficulty breathing overall. Patient's night-time wheezing is baseline according to partner, and can be explained by Emphysema.  P: Patient has had no symptoms of bronchitis. Counseled on smoking cessation (see below), instructed to follow up if chronic cough returns.  Encouraged continued follow up with Dr. Macedo.  Annual chest CT for lung cancer screening due to 52 PY smoking history in current smoker age 63.    Tobacco Use Disorder  A: Patient now smoking 1/2 ppd, currently seems to be in contemplative stage of behavioral change, has no specific plan to quit smoking. Has tried gum/patch and Chantix, but is uninterested in trying any of these again. Feels confident he can quit on his own.  P: Patient set goal of complete cessation of tobacco in 1 year. Patient uninterested in medical help at this point, but says he will follow up if he decides he needs help.     Bilateral sensorineural hearing loss  A: Patient has hearing loss on physical exam, with decreased air conductance bilaterally. Tympanic membrane was not able to be visualized b/c of patient's abnormal middle ear anatomy. Lack of fever, adenopathy, sore throat, rhinorrhea/congestion make recurrent ear infections less likely. However, report of brown/yellow liquid discharge raises suspicion for infectious process. Need further investigation into cause of hearing loss  P: Patient referred to ear specialist for management of hearing loss. Will f/u with patient at next visit to determine progress    BPH  A: Patient no longer having symptoms of BPH and prefers not to be on Flomax. No symptoms of back pain or weight loss to raise concern for prostate cancer, and weight has been stable.  P: Informed patient that if he continues to not  have symptoms of BPH, he does not need to take Flomax. Instructed patient to follow up if symptoms return for re-evaluation.     Health Maintenance:  A: Due for CT of chest and Colonoscopy. No HIV/HCV test on record. Lipid panel over a year old and unable to view prior lipid studies in care everywhere. Ordered lipids today to help assess ongoing need for statin and if he may also benefit from aspirin.  P: Referred for CT of chest  Referred for Colonoscopy  HCV, HIV tests and Lipid panel drawn in clinic.    Patient seen and note completed by Alex Kelly (MS3)    Brittany Yates MD, MPH  [facility] Resident     Precepted patient with Cheryl Jung MD    Options for treatment and follow-up care were reviewed with the patient and/or guardian. Zafar Spencer and/or guardian engaged in the decision making process and verbalized understanding of the options discussed and agreed with the final plan.      Lung Cancer Screening Shared Decision Making Visit     Zafar Spencer is eligible for lung cancer screening on the basis of the information provided in my signed lung cancer screening order.     I have discussed with patient the risks and benefits of screening for lung cancer with low-dose CT.     The risks include:   radiation exposure: one low dose chest CT has as much ionizing radiation as  about 15 chest x-rays or 6 months of background radiation living in [place]     false positives: 96% of positive findings/nodules are NOT cancer, but some  might still require additional diagnostic evaluation, including biopsy   over-diagnosis: some slow growing cancers that might never have been clinically  significant will be detected and treated unnecessarily     The benefit of early detection of lung cancer is contingent upon adherence to annual screening or more frequent follow up if indicated.     Furthermore, reaping the benefits of screening requires Zafar Spencer to be willing and physically able to undergo  diagnostic procedures, if indicated. Although no specific guide is available for determining severity of comorbidities, it is reasonable to withhold screening in patients who have greater mortality risk from other diseases.     We did discuss that the only way to prevent lung cancer is to not smoke. Smoking cessation assistance was offered.    I did not offer risk estimation using a calculator such as this one:    ShouldIScreen

## 2018-07-13 NOTE — PATIENT INSTRUCTIONS
We will let you know the results of blood tests next week.   Your cholesterol test will help us know if you would benefit from being on your statin or from taking an aspirin.   We will call you to set up your colonoscopy, hearing referral, and CT scan.       Lung Cancer Screening   Frequently Asked Questions  If you are at high-risk for lung cancer, getting screened with low-dose computed tomography (LDCT) every year can help save your life. This handout offers answers to some of the most common questions about lung cancer screening. If you have other questions, please call [phone] ([phone]).     What is it?  Lung cancer screening uses special X-ray technology to create an image of your lung tissue. The exam is quick and easy and takes less than 10 seconds. We don t give you any medicine or use any needles. You can eat before and after the exam. You don t need to change your clothes as long as the clothing on your chest doesn t contain metal. But, you do need to be able to hold your breath for at least 6 seconds during the exam.    What is the goal of lung cancer screening?  The goal of lung cancer screening is to save lives. Many times, lung cancer is not found until a person starts having physical symptoms. Lung cancer screening can help detect lung cancer in the earliest stages when it may be easier to treat.    Who should be screened for lung cancer?  We suggest lung cancer screening for anyone who is at high-risk for lung cancer. You are in the high-risk group if you:      are between the ages of 55 and 79, and    have smoked at least 1 pack of cigarettes a day for 30 or more years, and    still smoke or have quit within the past 15 years.    However, if you have a new cough or shortness of breath, you should talk to your doctor before being screened.    Some national lung health advocacy groups also recommend screening for people ages 50 to 79 who have smoked an average of 1 pack of cigarettes  a day for 20 years. They must also have at least 1 other risk factor for lung cancer, not including exposure to secondhand smoke. Other risk factors are having had cancer in the past, emphysema, pulmonary fibrosis, COPD, a family history of lung cancer, or exposure to certain materials such as arsenic, asbestos, beryllium, cadmium, chromium, diesel fumes, nickel, radon or silica. Your care team can help you know if you have one of these risk factors.     Why does it matter if I have symptoms?  Certain symptoms can be a sign that you have a condition in your lungs that should be checked and treated by your doctor. These symptoms include fever, chest pain, a new or changing cough, shortness of breath that you have never felt before, coughing up blood or unexplained weight loss. Having any of these symptoms can greatly affect the results of lung cancer screening.       Should all smokers get an LDCT lung cancer screening exam?  It depends. Lung cancer screening is for a very specific group of men and women who have a history of heavy smoking over a long period of time (see  Who should be screened for lung cancer  above).  I am in the high-risk group, but have been diagnosed with cancer in the past. Is LDCT lung cancer screening right for me?  In some cases, you should not have LDCT lung screening, such as when your doctor is already following your cancer with CT scan studies. Your doctor will help you decide if LDCT lung screening is right for you.  Do I need to have a screening exam every year?  Yes. If you are in the high-risk group described earlier, you should get an LDCT lung cancer screening exam every year until you are 79, or are no longer willing or able to undergo screening and possible procedures to diagnose and treat lung cancer.  How effective is LDCT at preventing death from lung cancer?  Studies have shown that LDCT lung cancer screening can lower the risk of death from lung cancer by 20 percent in  people who are at high-risk.  What are the risks?  There are some risks and limitations of LDCT lung cancer screening. We want to make sure you understand the risks and benefits, so please let us know if you have any questions. Your doctor may want to talk with you more about these risks.    Radiation exposure: As with any exam that uses radiation, there is a very small increased risk of cancer. The amount of radiation in LDCT is small--about the same amount a person would get from a mammogram. Your doctor orders the exam when he or she feels the potential benefits outweigh the risks.    False negatives: No test is perfect, including LDCT. It is possible that you may have a medical condition, including lung cancer, that is not found during your exam. This is called a false negative result.    False positives and more testing: LDCT very often finds something in the lung that could be cancer, but in fact is not. This is called a false positive result. False positive tests often cause anxiety. To make sure these findings are not cancer, you may need to have more tests. These tests will be done only if you give us permission. Sometimes patients need a treatment that can have side effects, such as a biopsy. For more information on false positives, see  What can I expect from the results?     Findings not related to lung cancer: Your LDCT exam also takes pictures of areas of your body next to your lungs. In a very small number of cases, the CT scan will show an abnormal finding in one of these areas, such as your kidneys, adrenal glands, liver or thyroid. This finding may not be serious, but you may need more tests. Your doctor can help you decide what other tests you may need, if any.  What can I expect from the results?  About 1 out of 4 LDCT exams will find something that may need more tests. Most of the time, these findings are lung nodules. Lung nodules are very small collections of tissue in the lung. These nodules  are very common, and the vast majority--more than 97 percent--are not cancer (benign). Most are normal lymph nodes or small areas of scarring from past infections.  But, if a small lung nodule is found to be cancer, the cancer can be cured more than 90 percent of the time. To know if the nodule is cancer, we may need to get more images before your next yearly screening exam. If the nodule has suspicious features (for example, it is large, has an odd shape or grows over time), we will refer you to a specialist for further testing.  Will my doctor also get the results?  Yes. Your doctor will get a copy of your results.  Is it okay to keep smoking now that there s a cancer screening exam?  No. Tobacco is one of the strongest cancer-causing agents. It causes not only lung cancer, but other cancers and cardiovascular (heart) diseases as well. The damage caused by smoking builds over time. This means that the longer you smoke, the higher your risk of disease. While it is never too late to quit, the sooner you quit, the better.  Where can I find help to quit smoking?  The best way to prevent lung cancer is to stop smoking. If you have already quit smoking, congratulations and keep it up! For help on quitting smoking, please call [facility] at 9-076-951-VSUO ([phone]) or the American Cancer Society at 1-672.765.5876 to find local resources near you.  One-on-one health coaching:  If you d prefer to work individually with a health care provider on tobacco cessation, we offer:      Medication Therapy Management:  Our specially trained pharmacists work closely with you and your doctor to help you quit smoking.  Call [phone] or [phone] (toll free).     Can Do: Health coaching offered by [facility].  [email or web address]

## 2018-07-13 NOTE — MR AVS SNAPSHOT
After Visit Summary   7/13/2018    Zafar Spencer    MRN: [id number]           Patient Information     Date Of Birth          1955        Visit Information        Provider Department      7/13/2018 1:40 PM Brittany Yates MD [facility]        Today's Diagnoses     Pulmonary emphysema, unspecified emphysema type (H)    -  1    Tobacco use        Bilateral sensorineural hearing loss        Screening for malignant neoplasm of the rectum        Screening for human immunodeficiency virus        Need for hepatitis C screening test        Personal history of tobacco use          Care Instructions    We will let you know the results of blood tests next week.   Your cholesterol test will help us know if you would benefit from being on your statin or from taking an aspirin.   We will call you to set up your colonoscopy, hearing referral, and CT scan.       Lung Cancer Screening   Frequently Asked Questions  If you are at high-risk for lung cancer, getting screened with low-dose computed tomography (LDCT) every year can help save your life. This handout offers answers to some of the most common questions about lung cancer screening. If you have other questions, please call [phone] ([phone]).     What is it?  Lung cancer screening uses special X-ray technology to create an image of your lung tissue. The exam is quick and easy and takes less than 10 seconds. We don t give you any medicine or use any needles. You can eat before and after the exam. You don t need to change your clothes as long as the clothing on your chest doesn t contain metal. But, you do need to be able to hold your breath for at least 6 seconds during the exam.    What is the goal of lung cancer screening?  The goal of lung cancer screening is to save lives. Many times, lung cancer is not found until a person starts having physical symptoms. Lung cancer screening can help detect lung cancer in the earliest  stages when it may be easier to treat.    Who should be screened for lung cancer?  We suggest lung cancer screening for anyone who is at high-risk for lung cancer. You are in the high-risk group if you:      are between the ages of 55 and 79, and    have smoked at least 1 pack of cigarettes a day for 30 or more years, and    still smoke or have quit within the past 15 years.    However, if you have a new cough or shortness of breath, you should talk to your doctor before being screened.    Some national lung health advocacy groups also recommend screening for people ages 50 to 79 who have smoked an average of 1 pack of cigarettes a day for 20 years. They must also have at least 1 other risk factor for lung cancer, not including exposure to secondhand smoke. Other risk factors are having had cancer in the past, emphysema, pulmonary fibrosis, COPD, a family history of lung cancer, or exposure to certain materials such as arsenic, asbestos, beryllium, cadmium, chromium, diesel fumes, nickel, radon or silica. Your care team can help you know if you have one of these risk factors.     Why does it matter if I have symptoms?  Certain symptoms can be a sign that you have a condition in your lungs that should be checked and treated by your doctor. These symptoms include fever, chest pain, a new or changing cough, shortness of breath that you have never felt before, coughing up blood or unexplained weight loss. Having any of these symptoms can greatly affect the results of lung cancer screening.       Should all smokers get an LDCT lung cancer screening exam?  It depends. Lung cancer screening is for a very specific group of men and women who have a history of heavy smoking over a long period of time (see  Who should be screened for lung cancer  above).  I am in the high-risk group, but have been diagnosed with cancer in the past. Is LDCT lung cancer screening right for me?  In some cases, you should not have LDCT lung  screening, such as when your doctor is already following your cancer with CT scan studies. Your doctor will help you decide if LDCT lung screening is right for you.  Do I need to have a screening exam every year?  Yes. If you are in the high-risk group described earlier, you should get an LDCT lung cancer screening exam every year until you are 79, or are no longer willing or able to undergo screening and possible procedures to diagnose and treat lung cancer.  How effective is LDCT at preventing death from lung cancer?  Studies have shown that LDCT lung cancer screening can lower the risk of death from lung cancer by 20 percent in people who are at high-risk.  What are the risks?  There are some risks and limitations of LDCT lung cancer screening. We want to make sure you understand the risks and benefits, so please let us know if you have any questions. Your doctor may want to talk with you more about these risks.    Radiation exposure: As with any exam that uses radiation, there is a very small increased risk of cancer. The amount of radiation in LDCT is small--about the same amount a person would get from a mammogram. Your doctor orders the exam when he or she feels the potential benefits outweigh the risks.    False negatives: No test is perfect, including LDCT. It is possible that you may have a medical condition, including lung cancer, that is not found during your exam. This is called a false negative result.    False positives and more testing: LDCT very often finds something in the lung that could be cancer, but in fact is not. This is called a false positive result. False positive tests often cause anxiety. To make sure these findings are not cancer, you may need to have more tests. These tests will be done only if you give us permission. Sometimes patients need a treatment that can have side effects, such as a biopsy. For more information on false positives, see  What can I expect from the results?      Findings not related to lung cancer: Your LDCT exam also takes pictures of areas of your body next to your lungs. In a very small number of cases, the CT scan will show an abnormal finding in one of these areas, such as your kidneys, adrenal glands, liver or thyroid. This finding may not be serious, but you may need more tests. Your doctor can help you decide what other tests you may need, if any.  What can I expect from the results?  About 1 out of 4 LDCT exams will find something that may need more tests. Most of the time, these findings are lung nodules. Lung nodules are very small collections of tissue in the lung. These nodules are very common, and the vast majority--more than 97 percent--are not cancer (benign). Most are normal lymph nodes or small areas of scarring from past infections.  But, if a small lung nodule is found to be cancer, the cancer can be cured more than 90 percent of the time. To know if the nodule is cancer, we may need to get more images before your next yearly screening exam. If the nodule has suspicious features (for example, it is large, has an odd shape or grows over time), we will refer you to a specialist for further testing.  Will my doctor also get the results?  Yes. Your doctor will get a copy of your results.  Is it okay to keep smoking now that there s a cancer screening exam?  No. Tobacco is one of the strongest cancer-causing agents. It causes not only lung cancer, but other cancers and cardiovascular (heart) diseases as well. The damage caused by smoking builds over time. This means that the longer you smoke, the higher your risk of disease. While it is never too late to quit, the sooner you quit, the better.  Where can I find help to quit smoking?  The best way to prevent lung cancer is to stop smoking. If you have already quit smoking, congratulations and keep it up! For help on quitting smoking, please call [employer] at 0-617-449-WPGX ([phone]) or the American Cancer Society  at 1-920.948.4334 to find local resources near you.  One-on-one health coaching:  If you d prefer to work individually with a health care provider on tobacco cessation, we offer:      Medication Therapy Management:  Our specially trained pharmacists work closely with you and your doctor to help you quit smoking.  Call [phone] or [phone] (toll free).     Can Do: Health coaching offered by [facility].  [email or web address]            Follow-ups after your visit        Additional Services     GASTROENTEROLOGY ADULT REF PROCEDURE ONLY Other ([facility])       Last Lab Result: No results found for: CR  Body mass index is 19.91 kg/(m^2).     Needed:  No  Language:  English    Patient will be contacted to schedule procedure.     Please be aware that coverage of these services is subject to the terms and limitations of your health insurance plan.  Call member services at your health plan with any benefit or coverage questions.  Any procedures must be performed at a [facility] facility OR coordinated by your clinic's referral office.    Please bring the following with you to your appointment:    (1) Any X-Rays, CTs or MRIs which have been performed.  Contact the facility where they were done to arrange for  prior to your scheduled appointment.    (2) List of current medications   (3) This referral request   (4) Any documents/labs given to you for this referral            GASTROENTEROLOGY ADULT REF PROCEDURE ONLY Other ([facility])       Last Lab Result: No results found for: CR  Body mass index is 19.91 kg/(m^2).     Needed:  No  Language:  English    Patient will be contacted to schedule procedure.     Please be aware that coverage of these services is subject to the terms and limitations of your health insurance plan.  Call member services at your health plan with any benefit or coverage questions.  Any procedures must be performed at a [facility] facility OR coordinated by your  "clinic's referral office.    Please bring the following with you to your appointment:    (1) Any X-Rays, CTs or MRIs which have been performed.  Contact the facility where they were done to arrange for  prior to your scheduled appointment.    (2) List of current medications   (3) This referral request   (4) Any documents/labs given to you for this referral            OTOLARYNGOLOGY REFERRAL       Patient to stop at the [facility] Desk    Reason for Referral: bilateral sensorineural hearing loss and drainage     needed: No  Language: English    May leave message on voicemail: Yes    ([facility] Only) Referral should be tracked (Yes/No)? yes                  Follow-up notes from your care team     Return in about 1 month (around 8/13/2018) for Physical.      Future tests that were ordered for you today     Open Future Orders        Priority Expected Expires Ordered    CT Chest Lung Cancer Scrn Low Dose wo Routine 7/27/2018 7/13/2019 7/13/2018            Who to contact     Please call your clinic at [phone] to:    Ask questions about your health    Make or cancel appointments    Discuss your medicines    Learn about your test results    Speak to your doctor            Additional Information About Your Visit        Care EveryWhere ID     This is your Care EveryWhere ID. This could be used by other organizations to access your [facility] medical records  [id number]        Your Vitals Were     Pulse Temperature Respirations Height Pulse Oximetry BMI (Body Mass Index)    52 97.7  F (36.5  C) (Oral) 16 5' 2.89\" (159.8 cm) 98% 19.91 kg/m2       Blood Pressure from Last 3 Encounters:   07/13/18 97/60   04/26/18 100/62   01/10/18 90/66    Weight from Last 3 Encounters:   07/13/18 112 lb (50.8 kg)   04/26/18 117 lb 3.2 oz (53.2 kg)   01/10/18 112 lb (50.8 kg)              We Performed the Following     GASTROENTEROLOGY ADULT REF PROCEDURE ONLY Other ([facility])     GASTROENTEROLOGY ADULT REF PROCEDURE ONLY Other ([facility])     Hepatitis C Antibody ([facility])     Hepatitis C Antibody ([facility])     [facility] Ag[facility]Ab [facility] [place] [facility]     HIV Ag/Ab Screen [place] ([facility])     Lipid Panel ([facility]) - Results < 1 hr     Okay for Smoking Cessation Study ([facility]) to Contact Patient     OTOLARYNGOLOGY REFERRAL     Prof fee: Shared Decisionmaking for Lung Cancer Screening        Primary Care Provider Office Phone # Fax #    Alessio Migue Baker [phone]       [address] [address]        Equal Access to Services     [facility]: Hadii aad ku hadasho Soomaali, waaxda luqadaha, qaybta kaalmada adeegyada, waxay idiin hayaan adeazra purvis . So [facility] [phone].    ATENCIÓN: Si habla español, tiene a akbar disposición servicios gratuitos de asistencia lingüística. [facility] [phone].    We comply with applicable federal civil rights laws and [place] laws. We do not discriminate on the basis of race, color, national origin, age, disability, sex, sexual orientation, or gender identity.            Thank you!     Thank you for choosing [facility]  for your care. Our goal is always to provide you with excellent care. Hearing back from our patients is one way we can continue to improve our services. Please take a few minutes to complete the written survey that you may receive in the mail after your visit with us. Thank you!             Your Updated Medication List - Protect others around you: Learn how to safely use, store and throw away your medicines at www.disposemymeds.org.          This list is accurate as of 7/13/18  2:33 PM.  Always use your most recent med list.                   Brand Name Dispense Instructions for use Diagnosis    simvastatin 20 MG tablet    ZOCOR    90 tablet    Take 1 tablet (20 mg) by mouth daily    Healthcare maintenance

## 2018-07-16 LAB — HCV AB SER QL: NEGATIVE

## 2018-07-18 ENCOUNTER — AMBULATORY - HEALTHEAST (OUTPATIENT)
Dept: SURGERY | Facility: CLINIC | Age: 63
End: 2018-07-18

## 2018-07-18 DIAGNOSIS — H60.393 INFECTIVE OTITIS EXTERNA, BILATERAL: ICD-10-CM

## 2018-07-18 DIAGNOSIS — H90.3 BILATERAL SENSORINEURAL HEARING LOSS: ICD-10-CM

## 2018-07-19 ENCOUNTER — OFFICE VISIT (OUTPATIENT)
Dept: FAMILY MEDICINE | Facility: CLINIC | Age: 63
End: 2018-07-19
Payer: COMMERCIAL

## 2018-07-19 ENCOUNTER — RECORDS - HEALTHEAST (OUTPATIENT)
Dept: ADMINISTRATIVE | Facility: OTHER | Age: 63
End: 2018-07-19

## 2018-07-19 VITALS
OXYGEN SATURATION: 96 % | RESPIRATION RATE: 18 BRPM | SYSTOLIC BLOOD PRESSURE: 98 MMHG | BODY MASS INDEX: 19.35 KG/M2 | DIASTOLIC BLOOD PRESSURE: 65 MMHG | HEART RATE: 78 BPM | WEIGHT: 109.2 LBS | TEMPERATURE: 98.3 F | HEIGHT: 63 IN

## 2018-07-19 DIAGNOSIS — Z12.11 SCREENING FOR COLON CANCER: ICD-10-CM

## 2018-07-19 DIAGNOSIS — Z72.0 TOBACCO USE: ICD-10-CM

## 2018-07-19 DIAGNOSIS — R91.8 PULMONARY NODULES: ICD-10-CM

## 2018-07-19 DIAGNOSIS — Z00.00 HEALTHCARE MAINTENANCE: ICD-10-CM

## 2018-07-19 DIAGNOSIS — R05.9 COUGH: Primary | ICD-10-CM

## 2018-07-19 RX ORDER — SIMVASTATIN 20 MG
20 TABLET ORAL DAILY
Qty: 90 TABLET | Refills: 3 | Status: SHIPPED | OUTPATIENT
Start: 2018-07-19 | End: 2018-09-14 | Stop reason: ALTCHOICE

## 2018-07-19 RX ORDER — AZITHROMYCIN 250 MG/1
TABLET, FILM COATED ORAL
Qty: 6 TABLET | Refills: 0 | Status: SHIPPED | OUTPATIENT
Start: 2018-07-19 | End: 2018-07-27

## 2018-07-19 NOTE — LETTER
July 23, 2018      Zafar Spencer  [address]  [place] [address]        Dear Zafar,     Your chest xray does not show any pneumonia or acute problems.    WR    Please see below for your test results.    No results found from the In Basket message.    If you have any questions, please call the clinic to make an appointment.    Sincerely,    Manjit Umanzor MD

## 2018-07-19 NOTE — PATIENT INSTRUCTIONS
For your cough:  - If you are starting to wheeze, use your nebulizer machine  -       Your medication list is printed, please keep this with you, it is helpful to bring this current list to any other medical appointments, the emergency room or hospital.    If you had lab testing today and your results are reassuring or normal they will be be mailed to you within 7 days.     If the lab tests need quick action we will call you with the results.   The phone number we will call with results is # [phone] (home) . If this is not the best number please call our clinic and change the number.    If you need any refills please call your pharmacy and they will contact us.    If you have any further concerns or wish to schedule another appointment you must call our office during normal business hours  [phone] (8-5:00 M-F)  If you have urgent medical questions that cannot wait  you may also call [phone] at any time of day.  If you have a medical emergency please call 911.    Thank you for coming to [facility].    Take the antibiotic until gone.  Recheck if not clear in 2 weeks.  WR      Referral for ( TEST )  :      Colonoscopy   LOCATION/PLACE/Provider :    [facility]   DATE & TIME :     8-9-2018 at 9:30 arriving by 8:30  PHONE :     [phone]  Appointment made by clinic staff/:    Amy

## 2018-07-19 NOTE — MR AVS SNAPSHOT
After Visit Summary   7/19/2018    Zafar Spencer    MRN: [id number]           Patient Information     Date Of Birth          1955        Visit Information        Provider Department      7/19/2018 11:00 AM Manjit Umanzor MD [facility]        Today's Diagnoses     Cough    -  1    Pulmonary nodules        Tobacco use        Screening for colon cancer        Healthcare maintenance          Care Instructions    For your cough:  - If you are starting to wheeze, use your nebulizer machine  -       Your medication list is printed, please keep this with you, it is helpful to bring this current list to any other medical appointments, the emergency room or hospital.    If you had lab testing today and your results are reassuring or normal they will be be mailed to you within 7 days.     If the lab tests need quick action we will call you with the results.   The phone number we will call with results is # [phone] (home) . If this is not the best number please call our clinic and change the number.    If you need any refills please call your pharmacy and they will contact us.    If you have any further concerns or wish to schedule another appointment you must call our office during normal business hours  [phone] (8-5:00 M-F)  If you have urgent medical questions that cannot wait  you may also call [phone] at any time of day.  If you have a medical emergency please call [phone].    Thank you for coming to [facility].              Follow-ups after your visit        Additional Services     GASTROENTEROLOGY ADULT REF PROCEDURE ONLY Other (Dr. Roche)       Last Lab Result: No results found for: CR  Body mass index is 19.59 kg/(m^2).     Needed:  No  Language:  English    Patient will be contacted to schedule procedure.     Please be aware that coverage of these services is subject to the terms and limitations of your health insurance plan.  Call member services at  "your health plan with any benefit or coverage questions.  Any procedures must be performed at a [facility] facility OR coordinated by your clinic's referral office.    Please bring the following with you to your appointment:    (1) Any X-Rays, CTs or MRIs which have been performed.  Contact the facility where they were done to arrange for  prior to your scheduled appointment.    (2) List of current medications   (3) This referral request   (4) Any documents/labs given to you for this referral                  Your next 10 appointments already scheduled     Aug 10, 2018  1:40 PM CDT   PHYSICAL with Pan Sherman MD   [facility] ([facility])    [address] [place] [address]   [phone]              Who to contact     Please call your clinic at [phone] to:    Ask questions about your health    Make or cancel appointments    Discuss your medicines    Learn about your test results    Speak to your doctor            Additional Information About Your Visit        Care EveryWhere ID     This is your Care EveryWhere ID. This could be used by other organizations to access your [facility] medical records  [id number]        Your Vitals Were     Pulse Temperature Respirations Height Pulse Oximetry BMI (Body Mass Index)    78 98.3  F (36.8  C) (Oral) 18 5' 2.6\" (159 cm) 96% 19.59 kg/m2       Blood Pressure from Last 3 Encounters:   07/19/18 98/65   07/13/18 97/60   04/26/18 100/62    Weight from Last 3 Encounters:   07/19/18 109 lb 3.2 oz (49.5 kg)   07/13/18 112 lb (50.8 kg)   04/26/18 117 lb 3.2 oz (53.2 kg)              We Performed the Following     GASTROENTEROLOGY ADULT REF PROCEDURE ONLY Other (Dr. Roche)     XR CHEST 2 VW          Today's Medication Changes          These changes are accurate as of 7/19/18 11:33 AM.  If you have any questions, ask your nurse or doctor.               Start taking these medicines.        Dose/Directions    azithromycin 250 MG tablet   Commonly " known as:  ZITHROMAX   Used for:  Cough   Started by:  Manjit Umanzor MD        Two tablets first day, then one tablet daily for four days.   Quantity:  6 tablet   Refills:  0            Where to get your medicines      These medications were sent to [facility] [address] [facility] - [place], [place] - [address] [place] [address]  [address] [facility] [address]     Phone:  [phone]     azithromycin 250 MG tablet    simvastatin 20 MG tablet                Primary Care Provider Office Phone # Fax #    Alessio Acostamoses Baker [phone]       [address] [address]        Equal Access to Services     [facility]: Hadii aad ku hadasho Soomaali, waaxda luqadaha, qaybta kaalmada adeegyada, waxay ramonain hayaan adeazra purvis . So [facility] [phone].    ATENCIÓN: Si habla español, tiene a akbar disposición servicios gratuitos de asistencia lingüística. Llame al [phone].    We comply with applicable federal civil rights laws and [place] laws. We do not discriminate on the basis of race, color, national origin, age, disability, sex, sexual orientation, or gender identity.            Thank you!     Thank you for choosing [facility]  for your care. Our goal is always to provide you with excellent care. Hearing back from our patients is one way we can continue to improve our services. Please take a few minutes to complete the written survey that you may receive in the mail after your visit with us. Thank you!             Your Updated Medication List - Protect others around you: Learn how to safely use, store and throw away your medicines at www.disposemymeds.org.          This list is accurate as of 7/19/18 11:33 AM.  Always use your most recent med list.                   Brand Name Dispense Instructions for use Diagnosis    azithromycin 250 MG tablet    ZITHROMAX    6 tablet    Two tablets first day, then one tablet daily for four days.    Cough       simvastatin 20  MG tablet    ZOCOR    90 tablet    Take 1 tablet (20 mg) by mouth daily    Healthcare maintenance

## 2018-07-19 NOTE — PROGRESS NOTES
"Chief Complaint   Patient presents with     Cough     x2 days     Medication Reconciliation     Completed.     BP 98/65  Pulse 78  Temp 98.3  F (36.8  C) (Oral)  Resp 18  Ht 5' 2.6\" (159 cm)  Wt 109 lb 3.2 oz (49.5 kg)  SpO2 96%  BMI 19.59 kg/m2    SUBJECTIVE:  This is a 63-year-old male in for check of cough that has worsened.  He has a history of tobacco use and pulmonary nodules. He was supposed to get a chest x-ray to follow up on nodules but has not done that yet.  He is also due for a colonoscopy.   The cough has been coming on for the last couple of days, he has felt fever and chilled.  He has not checked his temperature.  His wife says he is losing weight and looking at his graph he has been in this range for the last few months.  He is working part-time as a  at [employer] and is able to do that work.  He does continue to smoke.   OBJECTIVE:   APPEARANCE:  Thin male in no acute distress, does have a tight cough.   LUNGS:  Rhonchi at the end of expiration.   CARDIOVASCULAR:  Regular rhythm.   ABDOMEN:  Soft, nontender.   EXTREMITIES:  No edema.   Chest x-ray completed and the nodules today.  We will have Radiology review.   ASSESSMENT:     1.  Cough, possible bronchitis.   2.  Follow up for nodules.    PLAN:   I have asked him to try is azithromycin 250 mg tablets 2 today and 1 daily for 4 more days.  We talked about stopping smoking.  He does not seem motivated at this time.  Will get results of his chest x-ray to him when they are available.  Will set him up for colonoscopy and will follow up on an as-needed basis.   The patient to recheck if he has worsening course or does not resolve his cough.   The patient and wife involved in medical decision making throughout the visit.       "

## 2018-07-27 ENCOUNTER — OFFICE VISIT (OUTPATIENT)
Dept: FAMILY MEDICINE | Facility: CLINIC | Age: 63
End: 2018-07-27
Payer: COMMERCIAL

## 2018-07-27 VITALS
OXYGEN SATURATION: 100 % | DIASTOLIC BLOOD PRESSURE: 66 MMHG | HEIGHT: 63 IN | WEIGHT: 113 LBS | BODY MASS INDEX: 20.02 KG/M2 | TEMPERATURE: 97.6 F | SYSTOLIC BLOOD PRESSURE: 103 MMHG | HEART RATE: 59 BPM

## 2018-07-27 DIAGNOSIS — J43.9 PULMONARY EMPHYSEMA, UNSPECIFIED EMPHYSEMA TYPE (H): Primary | ICD-10-CM

## 2018-07-27 DIAGNOSIS — Z72.0 TOBACCO USE: ICD-10-CM

## 2018-07-27 DIAGNOSIS — H61.303 EXTERNAL EAR CANAL STENOSIS, ACQUIRED, BILATERAL: ICD-10-CM

## 2018-07-27 NOTE — MR AVS SNAPSHOT
"              After Visit Summary   7/27/2018    Zafar Spencer    MRN: [id number]           Patient Information     Date Of Birth          1955        Visit Information        Provider Department      7/27/2018 4:20 PM Roverto Spicer MD [facility]        Today's Diagnoses     Pulmonary emphysema, unspecified emphysema type (H)    -  1    Tobacco use        External ear canal stenosis, acquired, bilateral           Follow-ups after your visit        Your next 10 appointments already scheduled     Aug 10, 2018  1:40 PM CDT   PHYSICAL with Pan Sherman MD   [facility] ([facility])    [address] [place] [address]   [phone]              Who to contact     Please call your clinic at [phone] to:    Ask questions about your health    Make or cancel appointments    Discuss your medicines    Learn about your test results    Speak to your doctor            Additional Information About Your Visit        Care EveryWhere ID     This is your Care EveryWhere ID. This could be used by other organizations to access your [place] medical records  [id number]        Your Vitals Were     Pulse Temperature Height Pulse Oximetry BMI (Body Mass Index)       59 97.6  F (36.4  C) (Oral) 5' 2.99\" (160 cm) 100% 20.02 kg/m2        Blood Pressure from Last 3 Encounters:   07/27/18 103/66   07/19/18 98/65   07/13/18 97/60    Weight from Last 3 Encounters:   07/27/18 113 lb (51.3 kg)   07/19/18 109 lb 3.2 oz (49.5 kg)   07/13/18 112 lb (50.8 kg)              Today, you had the following     No orders found for display       Primary Care Provider Office Phone # Fax #    Alessio Baker [phone]       [address] [address]        Equal Access to Services     LILIAN PHAN : Flavio Dugan, alina hayward, abdi santosalhuy nevarez, marc land. So [facility] [phone].    ATENCIÓN: Si " reema terrazas, tiene a akbar disposición servicios gratuitos de asistencia lingüística. Mal ferrell [phone].    We comply with applicable federal civil rights laws and [place] laws. We do not discriminate on the basis of race, color, national origin, age, disability, sex, sexual orientation, or gender identity.            Thank you!     Thank you for choosing [facility]  for your care. Our goal is always to provide you with excellent care. Hearing back from our patients is one way we can continue to improve our services. Please take a few minutes to complete the written survey that you may receive in the mail after your visit with us. Thank you!             Your Updated Medication List - Protect others around you: Learn how to safely use, store and throw away your medicines at www.disposemymeds.org.          This list is accurate as of 7/27/18 11:59 PM.  Always use your most recent med list.                   Brand Name Dispense Instructions for use Diagnosis    simvastatin 20 MG tablet    ZOCOR    90 tablet    Take 1 tablet (20 mg) by mouth daily    Healthcare maintenance

## 2018-07-27 NOTE — PROGRESS NOTES
Preceptor Attestation:   Patient seen, evaluated and discussed with the resident. I have verified the content of the note, which accurately reflects my assessment of the patient and the plan of care.  Supervising Physician:Manjit Umanzor MD  [facility]

## 2018-07-27 NOTE — PROGRESS NOTES
Chief Complaint   Patient presents with     Bronchiectasis     ongoing     Medication Reconciliation     Assessment and Plan   1. Pulmonary emphysema, unspecified emphysema type (H)  Again encouraged tobacco cessation given findings suggestive of COPD on CXR. Patient symptoms currently well controlled with mild nocturnal cough.    2. Tobacco use  Offered mediation and NRT for quitting and offered other resources as well. Patient would like to attempt cutting back on his own for now.    3. External ear canal stenosis, acquired, bilateral  No sign of discharge or infection.    Options for treatment and follow-up care were reviewed with the patient and/or guardian. Zafar Spencer and/or guardian engaged in the decision making process and verbalized understanding of the options discussed and agreed with the final plan.    Roverto Spicer MD  [facility], PGY-2    Precepted with Precepted with: Manjit Umanzor MD         HPI:   Zafar Spencer is a 63 year old male who presents to clinic today for follow up.    Patient was seen for coughing last week, diagnosed with bronchitis and given Azithromycin. CXR revealed some COPD. He is still smoking but trying to cut back. Used Chantex before and had difficulty sleeping.    He denies fevers or chills. Cough is improved. No SOB. He reports drainage from R ear.         Review of Systems:   A comprehensive 12 point review of systems was negative unless otherwise noted in the HPI.          PMHX:   Active Problems List  Patient Active Problem List   Diagnosis     Tobacco use     Pulmonary nodules     Benign prostatic hyperplasia with urinary frequency     Pulmonary emphysema, unspecified emphysema type (H)     Active problem list reviewed and updated.    Current Medications  Current Outpatient Prescriptions   Medication Sig Dispense Refill     simvastatin (ZOCOR) 20 MG tablet Take 1 tablet (20 mg) by mouth  "daily 90 tablet 3     Medication list reviewed and updated.    Social History  Social History   Substance Use Topics     Smoking status: Current Every Day Smoker     Packs/day: 0.25     Years: 52.00     Types: Cigarettes     Smokeless tobacco: Never Used      Comment: 1/2 pack/day     Alcohol use No     History   Drug Use     2.00 per week     Special: Marijuana     Allergies  Allergies   Allergen Reactions     Latex Rash     Penicillins Rash     Allergies and Medication Intolerances Updated         Physical Exam:     Vitals:    07/27/18 1642   BP: 103/66   Pulse: 59   Temp: 97.6  F (36.4  C)   TempSrc: Oral   SpO2: 100%   Weight: 113 lb (51.3 kg)   Height: 5' 2.99\" (160 cm)     Body mass index is 20.02 kg/(m^2).    GENERAL APPEARANCE: alert, no acute distress  HEENT: narrowed ear canals without tenderness or discharge  RESP: lungs clear to auscultation - no rales, rhonchi, or wheezes   CV: regular rate and rhythm, no murmur, click, rub, or gallop   ABDOMEN: soft, nontender, no hepatosplenomegaly or masses   MSK: extremities normal - no gross deformities noted   SKIN: no suspicious lesions or rashes   NEURO: Normal strength and tone, sensory exam grossly normal, mentation appears intact and speech normal   PSYCH: mood and affect normal/bright         "

## 2018-08-10 ENCOUNTER — OFFICE VISIT (OUTPATIENT)
Dept: FAMILY MEDICINE | Facility: CLINIC | Age: 63
End: 2018-08-10
Payer: COMMERCIAL

## 2018-08-10 VITALS
HEIGHT: 63 IN | OXYGEN SATURATION: 97 % | BODY MASS INDEX: 19.67 KG/M2 | DIASTOLIC BLOOD PRESSURE: 62 MMHG | TEMPERATURE: 97.9 F | SYSTOLIC BLOOD PRESSURE: 96 MMHG | WEIGHT: 111 LBS | HEART RATE: 71 BPM

## 2018-08-10 DIAGNOSIS — Z87.891 PERSONAL HISTORY OF TOBACCO USE: ICD-10-CM

## 2018-08-10 DIAGNOSIS — Z00.00 HEALTHCARE MAINTENANCE: ICD-10-CM

## 2018-08-10 DIAGNOSIS — Z00.00 ROUTINE GENERAL MEDICAL EXAMINATION AT A HEALTH CARE FACILITY: Primary | ICD-10-CM

## 2018-08-10 DIAGNOSIS — E78.5 HYPERLIPIDEMIA, UNSPECIFIED HYPERLIPIDEMIA TYPE: ICD-10-CM

## 2018-08-10 RX ORDER — BISACODYL 5 MG/1
TABLET, DELAYED RELEASE ORAL
Qty: 2 TABLET | Refills: 0 | Status: SHIPPED | OUTPATIENT
Start: 2018-08-10 | End: 2019-02-14

## 2018-08-10 RX ORDER — POLYETHYLENE GLYCOL 1450
POWDER (GRAM) MISCELLANEOUS
Qty: 238 G | Refills: 0 | Status: SHIPPED | OUTPATIENT
Start: 2018-08-10 | End: 2019-02-14

## 2018-08-10 RX ORDER — SIMETHICONE 80 MG
TABLET,CHEWABLE ORAL
Qty: 1 TABLET | Refills: 0 | Status: SHIPPED | OUTPATIENT
Start: 2018-08-10 | End: 2019-02-14

## 2018-08-10 RX ORDER — ATORVASTATIN CALCIUM 40 MG/1
40 TABLET, FILM COATED ORAL DAILY
Qty: 30 TABLET | Refills: 1 | Status: SHIPPED | OUTPATIENT
Start: 2018-08-10 | End: 2018-11-08

## 2018-08-10 NOTE — MR AVS SNAPSHOT
After Visit Summary   8/10/2018    Zafar Spencer    MRN: [id number]           Patient Information     Date Of Birth          1955        Visit Information        Provider Department      8/10/2018 1:40 PM Pan Sherman MD [facility]        Today's Diagnoses     Routine general medical examination at a health care facility    -  1    Hyperlipidemia, unspecified hyperlipidemia type        Healthcare maintenance        Personal history of tobacco use          Care Instructions      Preventive Health Recommendations  Male Ages 50 - 64    Yearly exam:             See your health care provider every year in order to  o   Review health changes.   o   Discuss preventive care.    o   Review your medicines if your doctor has prescribed any.     Have a cholesterol test every 5 years, or more frequently if you are at risk for high cholesterol/heart disease.     Have a diabetes test (fasting glucose) every three years. If you are at risk for diabetes, you should have this test more often.     Have a colonoscopy at age 50, or have a yearly FIT test (stool test). These exams will check for colon cancer.      Talk with your health care provider about whether or not a prostate cancer screening test (PSA) is right for you.    You should be tested each year for STDs (sexually transmitted diseases), if you re at risk.     Shots: Get a flu shot each year. Get a tetanus shot every 10 years.     Nutrition:    Eat at least 5 servings of fruits and vegetables daily.     Eat whole-grain bread, whole-wheat pasta and brown rice instead of white grains and rice.     Get adequate Calcium and Vitamin D.     Lifestyle    Exercise for at least 150 minutes a week (30 minutes a day, 5 days a week). This will help you control your weight and prevent disease.     Limit alcohol to one drink per day.     No smoking.     Wear sunscreen to prevent skin cancer.     See your dentist every six months for an exam and  cleaning.     See your eye doctor every 1 to 2 years.      Calcium: take 1500 units / day    Colonoscopy 6/18/18: Time of procedure is 9:30. You should get there at 8 am.     Lung Cancer Screening   Frequently Asked Questions  If you are at high-risk for lung cancer, getting screened with low-dose computed tomography (LDCT) every year can help save your life. This handout offers answers to some of the most common questions about lung cancer screening. If you have other questions, please call [phone] ([phone]).     What is it?  Lung cancer screening uses special X-ray technology to create an image of your lung tissue. The exam is quick and easy and takes less than 10 seconds. We don t give you any medicine or use any needles. You can eat before and after the exam. You don t need to change your clothes as long as the clothing on your chest doesn t contain metal. But, you do need to be able to hold your breath for at least 6 seconds during the exam.    What is the goal of lung cancer screening?  The goal of lung cancer screening is to save lives. Many times, lung cancer is not found until a person starts having physical symptoms. Lung cancer screening can help detect lung cancer in the earliest stages when it may be easier to treat.    Who should be screened for lung cancer?  We suggest lung cancer screening for anyone who is at high-risk for lung cancer. You are in the high-risk group if you:      are between the ages of 55 and 79, and    have smoked at least 1 pack of cigarettes a day for 30 or more years, and    still smoke or have quit within the past 15 years.    However, if you have a new cough or shortness of breath, you should talk to your doctor before being screened.    Some national lung health advocacy groups also recommend screening for people ages 50 to 79 who have smoked an average of 1 pack of cigarettes a day for 20 years. They must also have at least 1 other risk factor for lung  cancer, not including exposure to secondhand smoke. Other risk factors are having had cancer in the past, emphysema, pulmonary fibrosis, COPD, a family history of lung cancer, or exposure to certain materials such as arsenic, asbestos, beryllium, cadmium, chromium, diesel fumes, nickel, radon or silica. Your care team can help you know if you have one of these risk factors.     Why does it matter if I have symptoms?  Certain symptoms can be a sign that you have a condition in your lungs that should be checked and treated by your doctor. These symptoms include fever, chest pain, a new or changing cough, shortness of breath that you have never felt before, coughing up blood or unexplained weight loss. Having any of these symptoms can greatly affect the results of lung cancer screening.       Should all smokers get an LDCT lung cancer screening exam?  It depends. Lung cancer screening is for a very specific group of men and women who have a history of heavy smoking over a long period of time (see  Who should be screened for lung cancer  above).  I am in the high-risk group, but have been diagnosed with cancer in the past. Is LDCT lung cancer screening right for me?  In some cases, you should not have LDCT lung screening, such as when your doctor is already following your cancer with CT scan studies. Your doctor will help you decide if LDCT lung screening is right for you.  Do I need to have a screening exam every year?  Yes. If you are in the high-risk group described earlier, you should get an LDCT lung cancer screening exam every year until you are 79, or are no longer willing or able to undergo screening and possible procedures to diagnose and treat lung cancer.  How effective is LDCT at preventing death from lung cancer?  Studies have shown that LDCT lung cancer screening can lower the risk of death from lung cancer by 20 percent in people who are at high-risk.  What are the risks?  There are some risks and  limitations of LDCT lung cancer screening. We want to make sure you understand the risks and benefits, so please let us know if you have any questions. Your doctor may want to talk with you more about these risks.    Radiation exposure: As with any exam that uses radiation, there is a very small increased risk of cancer. The amount of radiation in LDCT is small--about the same amount a person would get from a mammogram. Your doctor orders the exam when he or she feels the potential benefits outweigh the risks.    False negatives: No test is perfect, including LDCT. It is possible that you may have a medical condition, including lung cancer, that is not found during your exam. This is called a false negative result.    False positives and more testing: LDCT very often finds something in the lung that could be cancer, but in fact is not. This is called a false positive result. False positive tests often cause anxiety. To make sure these findings are not cancer, you may need to have more tests. These tests will be done only if you give us permission. Sometimes patients need a treatment that can have side effects, such as a biopsy. For more information on false positives, see  What can I expect from the results?     Findings not related to lung cancer: Your LDCT exam also takes pictures of areas of your body next to your lungs. In a very small number of cases, the CT scan will show an abnormal finding in one of these areas, such as your kidneys, adrenal glands, liver or thyroid. This finding may not be serious, but you may need more tests. Your doctor can help you decide what other tests you may need, if any.  What can I expect from the results?  About 1 out of 4 LDCT exams will find something that may need more tests. Most of the time, these findings are lung nodules. Lung nodules are very small collections of tissue in the lung. These nodules are very common, and the vast majority--more than 97 percent--are not cancer  (benign). Most are normal lymph nodes or small areas of scarring from past infections.  But, if a small lung nodule is found to be cancer, the cancer can be cured more than 90 percent of the time. To know if the nodule is cancer, we may need to get more images before your next yearly screening exam. If the nodule has suspicious features (for example, it is large, has an odd shape or grows over time), we will refer you to a specialist for further testing.  Will my doctor also get the results?  Yes. Your doctor will get a copy of your results.  Is it okay to keep smoking now that there s a cancer screening exam?  No. Tobacco is one of the strongest cancer-causing agents. It causes not only lung cancer, but other cancers and cardiovascular (heart) diseases as well. The damage caused by smoking builds over time. This means that the longer you smoke, the higher your risk of disease. While it is never too late to quit, the sooner you quit, the better.  Where can I find help to quit smoking?  The best way to prevent lung cancer is to stop smoking. If you have already quit smoking, congratulations and keep it up! For help on quitting smoking, please call [employer] at 5-701-932-PFRH ([phone]) or the American Cancer Society at 1-968.732.1497 to find local resources near you.  One-on-one health coaching:  If you d prefer to work individually with a health care provider on tobacco cessation, we offer:      Medication Therapy Management:  Our specially trained pharmacists work closely with you and your doctor to help you quit smoking.  Call [phone] or [phone] (toll free).     Can Do: Health coaching offered by [facility].  [email or web address]    Calcium 1500 units daily    Colonoscopy 6/16/18 at 9:30 am/ Arrive by 8 am.          Follow-ups after your visit        Future tests that were ordered for you today     Open Future Orders        Priority Expected Expires Ordered    CT Chest Lung Cancer Scrn Low  "Dose wo Routine  8/10/2019 8/10/2018            Who to contact     Please call your clinic at [phone] to:    Ask questions about your health    Make or cancel appointments    Discuss your medicines    Learn about your test results    Speak to your doctor            Additional Information About Your Visit        Care EveryWhere ID     This is your Care EveryWhere ID. This could be used by other organizations to access your [facility] medical records  [id number]        Your Vitals Were     Pulse Temperature Height Pulse Oximetry BMI (Body Mass Index)       71 97.9  F (36.6  C) (Oral) 5' 2.91\" (159.8 cm) 97% 19.72 kg/m2        Blood Pressure from Last 3 Encounters:   08/10/18 96/62   07/27/18 103/66   07/19/18 98/65    Weight from Last 3 Encounters:   08/10/18 111 lb (50.3 kg)   07/27/18 113 lb (51.3 kg)   07/19/18 109 lb 3.2 oz (49.5 kg)              We Performed the Following     Okay for Smoking Cessation Study (PLUTO) to Contact Patient     Prof fee: Shared Decisionmaking for Lung Cancer Screening          Today's Medication Changes          These changes are accurate as of 8/10/18  2:39 PM.  If you have any questions, ask your nurse or doctor.               Start taking these medicines.        Dose/Directions    aspirin 81 MG tablet   Used for:  Healthcare maintenance   Started by:  Pan Sherman MD        Dose:  81 mg   Take 1 tablet (81 mg) by mouth daily   Quantity:  30 tablet   Refills:  1       atorvastatin 40 MG tablet   Commonly known as:  LIPITOR   Used for:  Healthcare maintenance   Started by:  Pan Sherman MD        Dose:  40 mg   Take 1 tablet (40 mg) by mouth daily   Quantity:  30 tablet   Refills:  1       bisacodyl 5 MG EC tablet   Commonly known as:  DULCOLAX   Used for:  Healthcare maintenance   Started by:  Pan Sherman MD        To use for Colonoscopy prep follow instruction provided by clinic   Quantity:  2 tablet   Refills:  0       Polyethylene Glycol Powd   Used for:  " Healthcare maintenance   Started by:  Pan Sherman MD        To use for Colonoscopy prep follow instruction provided by clinic   Quantity:  238 g   Refills:  0       simethicone 80 MG chewable tablet   Commonly known as:  MYLICON   Used for:  Healthcare maintenance   Started by:  Pan Sherman MD        To use for Colonoscopy prep follow instruction provided by clinic   Quantity:  1 tablet   Refills:  0            Where to get your medicines      These medications were sent to [place] [address] [facility] - [address] [address]     Phone:  [phone]     aspirin 81 MG tablet    atorvastatin 40 MG tablet    bisacodyl 5 MG EC tablet    Polyethylene Glycol Powd    simethicone 80 MG chewable tablet                Primary Care Provider Office Phone # Fax #    Alessio Migue Baker [phone]       [address] [address]        Equal Access to Services     PA[facility]: Hadii aad ku hadasho Soomaali, waaxda luqadaha, qaybta kaalmada adeegyada, waxay idiin hayaan adeeg kharash la'aan ah. So [facility] [phone].    ATENCIÓN: Si habla español, tiene a akbar disposición servicios gratuitos de asistencia lingüística. Mal al [phone].    We comply with applicable federal civil rights laws and [place] laws. We do not discriminate on the basis of race, color, national origin, age, disability, sex, sexual orientation, or gender identity.            Thank you!     Thank you for choosing [facility]  for your care. Our goal is always to provide you with excellent care. Hearing back from our patients is one way we can continue to improve our services. Please take a few minutes to complete the written survey that you may receive in the mail after your visit with us. Thank you!             Your Updated Medication List - Protect others around you: Learn how to safely use, store and throw away your medicines at  www.disposemymeds.org.          This list is accurate as of 8/10/18  2:39 PM.  Always use your most recent med list.                   Brand Name Dispense Instructions for use Diagnosis    aspirin 81 MG tablet     30 tablet    Take 1 tablet (81 mg) by mouth daily    Healthcare maintenance       atorvastatin 40 MG tablet    LIPITOR    30 tablet    Take 1 tablet (40 mg) by mouth daily    Healthcare maintenance       bisacodyl 5 MG EC tablet    DULCOLAX    2 tablet    To use for Colonoscopy prep follow instruction provided by clinic    Healthcare maintenance       Polyethylene Glycol Powd     238 g    To use for Colonoscopy prep follow instruction provided by clinic    Healthcare maintenance       simethicone 80 MG chewable tablet    MYLICON    1 tablet    To use for Colonoscopy prep follow instruction provided by clinic    Healthcare maintenance       simvastatin 20 MG tablet    ZOCOR    90 tablet    Take 1 tablet (20 mg) by mouth daily    Healthcare maintenance

## 2018-08-10 NOTE — PATIENT INSTRUCTIONS
Preventive Health Recommendations  Male Ages 50 - 64    Yearly exam:             See your health care provider every year in order to  o   Review health changes.   o   Discuss preventive care.    o   Review your medicines if your doctor has prescribed any.     Have a cholesterol test every 5 years, or more frequently if you are at risk for high cholesterol/heart disease.     Have a diabetes test (fasting glucose) every three years. If you are at risk for diabetes, you should have this test more often.     Have a colonoscopy at age 50, or have a yearly FIT test (stool test). These exams will check for colon cancer.      Talk with your health care provider about whether or not a prostate cancer screening test (PSA) is right for you.    You should be tested each year for STDs (sexually transmitted diseases), if you re at risk.     Shots: Get a flu shot each year. Get a tetanus shot every 10 years.     Nutrition:    Eat at least 5 servings of fruits and vegetables daily.     Eat whole-grain bread, whole-wheat pasta and brown rice instead of white grains and rice.     Get adequate Calcium and Vitamin D.     Lifestyle    Exercise for at least 150 minutes a week (30 minutes a day, 5 days a week). This will help you control your weight and prevent disease.     Limit alcohol to one drink per day.     No smoking.     Wear sunscreen to prevent skin cancer.     See your dentist every six months for an exam and cleaning.     See your eye doctor every 1 to 2 years.      Calcium: take 1500 units / day    Colonoscopy 6/18/18: Time of procedure is 9:30. You should get there at 8 am.     Lung Cancer Screening   Frequently Asked Questions  If you are at high-risk for lung cancer, getting screened with low-dose computed tomography (LDCT) every year can help save your life. This handout offers answers to some of the most common questions about lung cancer screening. If you have other questions, please call [phone]  ([phone]).     What is it?  Lung cancer screening uses special X-ray technology to create an image of your lung tissue. The exam is quick and easy and takes less than 10 seconds. We don t give you any medicine or use any needles. You can eat before and after the exam. You don t need to change your clothes as long as the clothing on your chest doesn t contain metal. But, you do need to be able to hold your breath for at least 6 seconds during the exam.    What is the goal of lung cancer screening?  The goal of lung cancer screening is to save lives. Many times, lung cancer is not found until a person starts having physical symptoms. Lung cancer screening can help detect lung cancer in the earliest stages when it may be easier to treat.    Who should be screened for lung cancer?  We suggest lung cancer screening for anyone who is at high-risk for lung cancer. You are in the high-risk group if you:      are between the ages of 55 and 79, and    have smoked at least 1 pack of cigarettes a day for 30 or more years, and    still smoke or have quit within the past 15 years.    However, if you have a new cough or shortness of breath, you should talk to your doctor before being screened.    Some national lung health advocacy groups also recommend screening for people ages 50 to 79 who have smoked an average of 1 pack of cigarettes a day for 20 years. They must also have at least 1 other risk factor for lung cancer, not including exposure to secondhand smoke. Other risk factors are having had cancer in the past, emphysema, pulmonary fibrosis, COPD, a family history of lung cancer, or exposure to certain materials such as arsenic, asbestos, beryllium, cadmium, chromium, diesel fumes, nickel, radon or silica. Your care team can help you know if you have one of these risk factors.     Why does it matter if I have symptoms?  Certain symptoms can be a sign that you have a condition in your lungs that should be checked and  treated by your doctor. These symptoms include fever, chest pain, a new or changing cough, shortness of breath that you have never felt before, coughing up blood or unexplained weight loss. Having any of these symptoms can greatly affect the results of lung cancer screening.       Should all smokers get an LDCT lung cancer screening exam?  It depends. Lung cancer screening is for a very specific group of men and women who have a history of heavy smoking over a long period of time (see  Who should be screened for lung cancer  above).  I am in the high-risk group, but have been diagnosed with cancer in the past. Is LDCT lung cancer screening right for me?  In some cases, you should not have LDCT lung screening, such as when your doctor is already following your cancer with CT scan studies. Your doctor will help you decide if LDCT lung screening is right for you.  Do I need to have a screening exam every year?  Yes. If you are in the high-risk group described earlier, you should get an LDCT lung cancer screening exam every year until you are 79, or are no longer willing or able to undergo screening and possible procedures to diagnose and treat lung cancer.  How effective is LDCT at preventing death from lung cancer?  Studies have shown that LDCT lung cancer screening can lower the risk of death from lung cancer by 20 percent in people who are at high-risk.  What are the risks?  There are some risks and limitations of LDCT lung cancer screening. We want to make sure you understand the risks and benefits, so please let us know if you have any questions. Your doctor may want to talk with you more about these risks.    Radiation exposure: As with any exam that uses radiation, there is a very small increased risk of cancer. The amount of radiation in LDCT is small--about the same amount a person would get from a mammogram. Your doctor orders the exam when he or she feels the potential benefits outweigh the risks.    False  negatives: No test is perfect, including LDCT. It is possible that you may have a medical condition, including lung cancer, that is not found during your exam. This is called a false negative result.    False positives and more testing: LDCT very often finds something in the lung that could be cancer, but in fact is not. This is called a false positive result. False positive tests often cause anxiety. To make sure these findings are not cancer, you may need to have more tests. These tests will be done only if you give us permission. Sometimes patients need a treatment that can have side effects, such as a biopsy. For more information on false positives, see  What can I expect from the results?     Findings not related to lung cancer: Your LDCT exam also takes pictures of areas of your body next to your lungs. In a very small number of cases, the CT scan will show an abnormal finding in one of these areas, such as your kidneys, adrenal glands, liver or thyroid. This finding may not be serious, but you may need more tests. Your doctor can help you decide what other tests you may need, if any.  What can I expect from the results?  About 1 out of 4 LDCT exams will find something that may need more tests. Most of the time, these findings are lung nodules. Lung nodules are very small collections of tissue in the lung. These nodules are very common, and the vast majority--more than 97 percent--are not cancer (benign). Most are normal lymph nodes or small areas of scarring from past infections.  But, if a small lung nodule is found to be cancer, the cancer can be cured more than 90 percent of the time. To know if the nodule is cancer, we may need to get more images before your next yearly screening exam. If the nodule has suspicious features (for example, it is large, has an odd shape or grows over time), we will refer you to a specialist for further testing.  Will my doctor also get the results?  Yes. Your doctor will get  a copy of your results.  Is it okay to keep smoking now that there s a cancer screening exam?  No. Tobacco is one of the strongest cancer-causing agents. It causes not only lung cancer, but other cancers and cardiovascular (heart) diseases as well. The damage caused by smoking builds over time. This means that the longer you smoke, the higher your risk of disease. While it is never too late to quit, the sooner you quit, the better.  Where can I find help to quit smoking?  The best way to prevent lung cancer is to stop smoking. If you have already quit smoking, congratulations and keep it up! For help on quitting smoking, please call [employer] at 1-615-460-AZAB (3922) or the American Cancer Society at 1-740.162.9930 to find local resources near you.  One-on-one health coaching:  If you d prefer to work individually with a health care provider on tobacco cessation, we offer:      Medication Therapy Management:  Our specially trained pharmacists work closely with you and your doctor to help you quit smoking.  Call [phone] or [phone] (toll free).     Can Do: Health coaching offered by [facility].  [email or web address]    Calcium 1500 units daily    Colonoscopy 6/16/18 at 9:30 am/ Arrive by 8 am.

## 2018-08-10 NOTE — PROGRESS NOTES
.    Male Physical Note      Concerns today: No special concerns today.    ROS:                      CONSTITUTIONAL: no fatigue, no unexpected change in weight  SKIN: no worrisome rashes, no worrisome moles, no worrisome lesions  EYES: no acute vision problems or changes  ENT: no ear problems, no mouth problems, no throat problems  RESP: no significant cough, no shortness of breath  CV: no chest pain, no palpitations, no new or worsening peripheral edema  GI: no nausea, no vomiting, no constipation, no diarrhea    No past medical history on file.     Family History   Problem Relation Age of Onset     Diabetes Mother      Hypertension No family hx of      HEART DISEASE No family hx of      Coronary Artery Disease No family hx of      Cancer No family hx of      Reviewed no other significant FH           Family History and past Medical History reviewed and unchanged/updated.    Social History   Substance Use Topics     Smoking status: Current Every Day Smoker     Packs/day: 0.25     Years: 52.00     Types: Cigarettes     Smokeless tobacco: Never Used      Comment: 1/2 pack/day     Alcohol use No       Children ? yes 3, adults    Has anyone hurt you physically, for example by pushing, hitting, slapping or kicking you or forcing you to have sex? Denies  Do you feel threatened or controlled by a partner, ex-partner or anyone in your life? Denies    RISK BEHAVIORS AND HEALTHY HABITS:  Tobacco Use/Smoking: -0.5 pack per day, has tried to quit several times. Insomnia with chantix. Not interested in quitting now.  Illicit Drug Use: None  ETOH: None now, drank of 1/5 of hard liquor day for approximately 1 year  Sexually Active: Yes  Diet (5-7 servings of fruits/veg daily): Breakfast: two eggs, pancake. Lunch: soup, cheese sandwich. Dinner: salmon fillet, veggies potatoes.   Exercise (30 min accumulated most days):No  Dental Care: No, yearly dental recommended  Calcium 1500 mg/d:  No,   Seat Belt Use: Yes     ASA use  "(>3% risk in 5 y):  Recommended and patient accepted testing. and Testing not indicated   Lung CA Screening with low dose CT (55 - 80, with >= 30 ppy smoking history who are current smokers or quit within last 15y - annual low dose CT) Recommended and patient accepted testing.  Prostate screening discussed and patient informed that risks of screening may exceed benefits.    CV Risk based on Pooled Cohort Risk: 10.2, moderate to high intensity statin recommended    Immunization History   Administered Date(s) Administered     Influenza Vaccine IM 3yrs+ 4 Valent IIV4 11/12/2017     Influenza Vaccine, 3 YRS +, IM (QUADRIVALENT W/PRESERVATIVES) 11/16/2015, 11/12/2017     Pneumo Conj 13-V (2010&after) 11/16/2015     Pneumococcal 23 valent 08/27/2014     TD (ADULT, 7+) 10/25/2005     TDAP Vaccine (Adacel) 10/19/2012     Reviewed Immunization Record Today    EXAMINATION:  BP 96/62  Pulse 71  Temp 97.9  F (36.6  C) (Oral)  Ht 5' 2.91\" (159.8 cm)  Wt 111 lb (50.3 kg)  SpO2 97%  BMI 19.72 kg/m2  GENERAL: healthy, alert and no distress  EYES: Eyes grossly normal to inspection, extraocular movements - intact, and PERRL  HENT: ear canals- normal; TMs- normal; Nose- normal; Mouth- no ulcers, no lesions  NECK: no tenderness, no adenopathy, no asymmetry, no masses, no stiffness; thyroid- normal to palpation  RESP: lungs clear to auscultation - no rales, no rhonchi, no wheezes  BREAST: no masses, no tenderness, no nipple discharge, no palpable axillary masses or adenopathy  CV: regular rates and rhythm, normal S1 S2, no S3 or S4 and no murmur, no click or rub -  ABDOMEN: soft, no tenderness, no  hepatosplenomegaly, no masses, normal bowel sounds  MS: extremities- no gross deformities noted, no edema  SKIN: no suspicious lesions, no rashes  NEURO: strength and tone- normal, sensory exam- grossly normal, mentation- intact, speech- normal, reflexes- symmetric  BACK: no CVA tenderness, no paralumbar tenderness  - male: testicles- " normal, no atrophy, no masses;  no inguinal hernias  RECTAL- male: no masses, no hemorhoids, Prostate- symmetric, no  nodularity, no masses, no hypertrophy  PSYCH: Alert and oriented times 3; speech- coherent , normal rate and volume; able to articulate logical thoughts, able to abstract reason, no tangential thoughts, no hallucinations or delusions, affect- normal  LYMPHATICS: ant. cervical- normal, post. cervical- normal, axillary- normal, supraclavicular- normal, inguinal- normal    ASSESSMENT and PLAN:    (E78.5) Hyperlipidemia, unspecified hyperlipidemia type  Comment: Lipids just checked in July 2018. So did not check today. ASCVD risk of 10.2%. Qualifies for moderate to High intensity statin so will start today.  Plan: atorvastatin (LIPITOR) 40 MG tablet,     (Z00.00) Healthcare maintenance  Comment:  Has colonoscopy already scheduled for 6/16/18 at 9:30 am. Informed patient of this and where to go. Needed prep represcibed so did this today. Still had instructions for prep. Qualifies for aspirin as well so started this.   Plan: aspirin 81 MG tablet, bisacodyl (DULCOLAX) 5 MG EC tablet,        Polyethylene Glycol POWD, simethicone (MYLICON)        80 MG chewable tablet    (Z87.891) Personal history of tobacco use  Comment: Not interested in quitting now but would most likely benefit the patient's emphysema significantly so will keep discussion going. Is due again for Lung cancer screening so ordered low dose CT scan.   Plan: Prof fee: Shared Decisionmaking for Lung Cancer        Screening, CT Chest Lung Cancer Scrn Low Dose         woRaghav for Smoking Cessation Study (PLUTO) to        Contact Patient      Results cited below have been reviewed and communicated to patient.    Lung Cancer Screening Shared Decision Making Visit     Zafar Spencer is eligible for lung cancer screening on the basis of the information provided in my signed lung cancer screening order.     I have discussed with patient the risks and  benefits of screening for lung cancer with low-dose CT.     The risks include:   radiation exposure: one low dose chest CT has as much ionizing radiation as  about 15 chest x-rays or 6 months of background radiation living in [place]     false positives: 96% of positive findings/nodules are NOT cancer, but some  might still require additional diagnostic evaluation, including biopsy   over-diagnosis: some slow growing cancers that might never have been clinically  significant will be detected and treated unnecessarily     The benefit of early detection of lung cancer is contingent upon adherence to annual screening or more frequent follow up if indicated.     Furthermore, reaping the benefits of screening requires Zafar Spencer to be willing and physically able to undergo diagnostic procedures, if indicated. Although no specific guide is available for determining severity of comorbidities, it is reasonable to withhold screening in patients who have greater mortality risk from other diseases.     We did discuss that the only way to prevent lung cancer is to not smoke. Smoking cessation assistance was offered.    I did not offer risk estimation using a calculator such as this one:    ShouldIScreen    Dr. Pan Sherman    Precepted Patient with Dr. Alex Roche

## 2018-08-10 NOTE — LETTER
August 17, 2018      Zafar Spencer  [address]  [facility] [address]        Dear Zafar,    Your results from your colonoscopy show that your colon is normal and you do not have colon cancer. You should be re screened for colon cancer in 10 years.     If you have more questions please let my PCS and tell her a number I can reach you at and I will call you back.       Sincerely,    Pan Sherman MD

## 2018-08-15 ENCOUNTER — ANESTHESIA - HEALTHEAST (OUTPATIENT)
Dept: SURGERY | Facility: AMBULATORY SURGERY CENTER | Age: 63
End: 2018-08-15

## 2018-08-16 ENCOUNTER — SURGERY - HEALTHEAST (OUTPATIENT)
Dept: SURGERY | Facility: AMBULATORY SURGERY CENTER | Age: 63
End: 2018-08-16

## 2018-08-16 ASSESSMENT — MIFFLIN-ST. JEOR: SCORE: 1228.98

## 2018-08-17 ENCOUNTER — OFFICE VISIT - HEALTHEAST (OUTPATIENT)
Dept: OTOLARYNGOLOGY | Facility: CLINIC | Age: 63
End: 2018-08-17

## 2018-08-17 ENCOUNTER — OFFICE VISIT - HEALTHEAST (OUTPATIENT)
Dept: AUDIOLOGY | Facility: CLINIC | Age: 63
End: 2018-08-17

## 2018-08-17 ENCOUNTER — TRANSFERRED RECORDS (OUTPATIENT)
Dept: HEALTH INFORMATION MANAGEMENT | Facility: CLINIC | Age: 63
End: 2018-08-17

## 2018-08-17 DIAGNOSIS — H90.A32 MIXED CONDUCTIVE AND SENSORINEURAL HEARING LOSS OF LEFT EAR WITH RESTRICTED HEARING OF RIGHT EAR: ICD-10-CM

## 2018-08-17 DIAGNOSIS — H61.303 STENOSIS OF BOTH EXTERNAL AUDITORY CANALS: ICD-10-CM

## 2018-08-17 PROBLEM — Z98.890 H/O COLONOSCOPY: Status: ACTIVE | Noted: 2018-08-17

## 2018-08-17 NOTE — PROGRESS NOTES
Ai or Anita,  Please call the patient and give them my message below,  Zafar  Your results from your colonoscopy show that your colon is normal and you do not have colon cancer. You should be re screened for colon cancer in 10 years.    If you have more questions please let my PCS and tell her a number I can reach you at and I will call you back.    Dr. Pan Sherman

## 2018-08-24 NOTE — PROGRESS NOTES
Preceptor Attestation:   Patient seen, evaluated and discussed with the resident. I have verified the content of the note, which accurately reflects my assessment of the patient and the plan of care.    Supervising Physician:Alex Roche MD    [facility]

## 2018-08-31 PROBLEM — H90.0 CONDUCTIVE HEARING LOSS, BILATERAL: Status: ACTIVE | Noted: 2018-08-31

## 2018-09-11 DIAGNOSIS — Z00.00 HEALTHCARE MAINTENANCE: ICD-10-CM

## 2018-09-11 RX ORDER — ATORVASTATIN CALCIUM 40 MG/1
40 TABLET, FILM COATED ORAL DAILY
Qty: 90 TABLET | OUTPATIENT
Start: 2018-09-11

## 2018-09-12 DIAGNOSIS — Z00.00 HEALTHCARE MAINTENANCE: ICD-10-CM

## 2018-09-14 ENCOUNTER — OFFICE VISIT (OUTPATIENT)
Dept: FAMILY MEDICINE | Facility: CLINIC | Age: 63
End: 2018-09-14
Payer: COMMERCIAL

## 2018-09-14 VITALS
HEIGHT: 63 IN | WEIGHT: 111.8 LBS | SYSTOLIC BLOOD PRESSURE: 90 MMHG | OXYGEN SATURATION: 97 % | TEMPERATURE: 98.1 F | DIASTOLIC BLOOD PRESSURE: 56 MMHG | HEART RATE: 67 BPM | BODY MASS INDEX: 19.81 KG/M2

## 2018-09-14 DIAGNOSIS — Z78.9 CURRENTLY ATTEMPTING TO QUIT USING TOBACCO: ICD-10-CM

## 2018-09-14 DIAGNOSIS — E78.5 HYPERLIPIDEMIA, UNSPECIFIED HYPERLIPIDEMIA TYPE: Primary | ICD-10-CM

## 2018-09-14 RX ORDER — NICOTINE 21 MG/24HR
1 PATCH, TRANSDERMAL 24 HOURS TRANSDERMAL EVERY 24 HOURS
Qty: 30 PATCH | Refills: 1 | Status: SHIPPED | OUTPATIENT
Start: 2018-09-14 | End: 2019-02-14

## 2018-09-14 NOTE — MR AVS SNAPSHOT
"              After Visit Summary   9/14/2018    Zafar Spencer    MRN: [id number]           Patient Information     Date Of Birth          1955        Visit Information        Provider Department      9/14/2018 2:00 PM Alessio Baker MD [facility]        Today's Diagnoses     Currently attempting to quit using tobacco    -  1       Follow-ups after your visit        Who to contact     Please call your clinic at [phone] to:    Ask questions about your health    Make or cancel appointments    Discuss your medicines    Learn about your test results    Speak to your doctor            Additional Information About Your Visit        Care EveryWhere ID     This is your Care EveryWhere ID. This could be used by other organizations to access your [place] medical records  [id number]        Your Vitals Were     Pulse Temperature Height Pulse Oximetry BMI (Body Mass Index)       67 98.1  F (36.7  C) (Oral) 5' 2.6\" (159 cm) 97% 20.06 kg/m2        Blood Pressure from Last 3 Encounters:   09/14/18 90/56   08/10/18 96/62   07/27/18 103/66    Weight from Last 3 Encounters:   09/14/18 111 lb 12.8 oz (50.7 kg)   08/10/18 111 lb (50.3 kg)   07/27/18 113 lb (51.3 kg)              Today, you had the following     No orders found for display         Today's Medication Changes          These changes are accurate as of 9/14/18  2:13 PM.  If you have any questions, ask your nurse or doctor.               Start taking these medicines.        Dose/Directions    nicotine 14 MG/24HR 24 hr patch   Commonly known as:  NICODERM CQ   Used for:  Currently attempting to quit using tobacco   Started by:  Alessio Baker MD        Dose:  1 patch   Place 1 patch onto the skin every 24 hours   Quantity:  30 patch   Refills:  1         Stop taking these medicines if you haven't already. Please contact your care team if you have questions.     simvastatin 20 MG tablet   Commonly known as:  ZOCOR   Stopped by:  Alessio Baker " Migue PACHECO MD                Where to get your medicines      These medications were sent to [facility] [id number] [facility] - [facility] [address] [place] [address]  [address] [facility] [address]     Phone:  [phone]     nicotine 14 MG/24HR 24 hr patch                Primary Care Provider Office Phone # Fax #    Alessio Migue Baker [phone]       [address] [address]        Equal Access to Services     LILIAN PHAN : Hadii aad ku hadasho Soomaali, waaxda luqadaha, qaybta kaalmada adeegyada, waxay idiin hayaan adeeg kharash la'aan ah. So [facility] [phone].    ATENCIÓN: Si habla español, tiene a akbar disposición servicios gratuitos de asistencia lingüística. [facility] [phone].    We comply with applicable federal civil rights laws and [place] laws. We do not discriminate on the basis of race, color, national origin, age, disability, sex, sexual orientation, or gender identity.            Thank you!     Thank you for choosing [facility]  for your care. Our goal is always to provide you with excellent care. Hearing back from our patients is one way we can continue to improve our services. Please take a few minutes to complete the written survey that you may receive in the mail after your visit with us. Thank you!             Your Updated Medication List - Protect others around you: Learn how to safely use, store and throw away your medicines at www.disposemymeds.org.          This list is accurate as of 9/14/18  2:13 PM.  Always use your most recent med list.                   Brand Name Dispense Instructions for use Diagnosis    aspirin 81 MG tablet     90 tablet    Take 1 tablet (81 mg) by mouth daily    Healthcare maintenance       atorvastatin 40 MG tablet    LIPITOR    30 tablet    Take 1 tablet (40 mg) by mouth daily    Healthcare maintenance       bisacodyl 5 MG EC tablet    DULCOLAX    2 tablet    To use for Colonoscopy prep follow instruction  provided by clinic    Healthcare maintenance       nicotine 14 MG/24HR 24 hr patch    NICODERM CQ    30 patch    Place 1 patch onto the skin every 24 hours    Currently attempting to quit using tobacco       Polyethylene Glycol Powd     238 g    To use for Colonoscopy prep follow instruction provided by clinic    Healthcare maintenance       simethicone 80 MG chewable tablet    MYLICON    1 tablet    To use for Colonoscopy prep follow instruction provided by clinic    Healthcare maintenance

## 2018-09-14 NOTE — PATIENT INSTRUCTIONS
- Continue with your current medication  - Continue with Atorvastatin  - Continue with nicotine patches

## 2018-09-14 NOTE — PROGRESS NOTES
HPI:       Zafar Spencer is a 63 year old  male with a significant past medical history of COPD, HLD, BPH and tobacco abuse who presents for follow up of concern(s) listed below    Medication Follow Up  Was seen in clinic approximately 1 month ago and was started on atorvastatin.  Patient has been noncompliant with atorvastatin due to forgetfulness.  Has been taken approximately 3-4 times since his last clinic visit.  Denies any acute issues with taking it but just does not recall or remember to take it at night when he was supposed to.  Has been intermittently compliant with his other medication as well.    Ear drainage  Zafar noticed that at night when he is lying down for bed, would notice a yellowish discharge on his pillow casing but denies any associated fever, chills, bloody ear discharge, ear pain, changes in hearing or any other associated symptoms.  Denies noticing any drainage after shower. Does use a Q-tip but denied any injuries         PMHX:     Patient Active Problem List   Diagnosis     Tobacco use     Pulmonary nodules     Benign prostatic hyperplasia with urinary frequency     Pulmonary emphysema, unspecified emphysema type (H)     HLD (hyperlipidemia)     H/O colonoscopy     Conductive hearing loss, bilateral       Current Outpatient Prescriptions   Medication Sig Dispense Refill     atorvastatin (LIPITOR) 40 MG tablet Take 1 tablet (40 mg) by mouth daily 30 tablet 1     aspirin 81 MG tablet Take 1 tablet (81 mg) by mouth daily 90 tablet 1     bisacodyl (DULCOLAX) 5 MG EC tablet To use for Colonoscopy prep follow instruction provided by clinic 2 tablet 0     Polyethylene Glycol POWD To use for Colonoscopy prep follow instruction provided by clinic 238 g 0     simethicone (MYLICON) 80 MG chewable tablet To use for Colonoscopy prep follow instruction provided by clinic 1 tablet 0     simvastatin (ZOCOR) 20 MG tablet Take 1 tablet (20 mg) by mouth daily (Patient not taking: Reported on  "8/10/2018) 90 tablet 3       Social History     Social History     Marital status:      Spouse name: N/A     Number of children: N/A     Years of education: N/A     Occupational History           Social History Main Topics     Smoking status: Current Every Day Smoker     Packs/day: 0.25     Years: 52.00     Types: Cigarettes     Smokeless tobacco: Never Used      Comment: 1/2 pack/day     Alcohol use No     Drug use: 2.00 per week     Special: Marijuana     Sexual activity: Not on file     Other Topics Concern     Not on file     Social History Narrative       Family History   Problem Relation Age of Onset     Diabetes Mother      Hypertension No family hx of      HEART DISEASE No family hx of      Coronary Artery Disease No family hx of      Cancer No family hx of           Allergies   Allergen Reactions     Latex Rash     Penicillins Rash       No results found for this or any previous visit (from the past 24 hour(s)).         Review of Systems:   C: NEGATIVE for fatigue, unexpected change in weight  I: NEGATIVE for worrisome rashes, moles or lesions  E: NEGATIVE for acute vision problems or changes  ENT/MOUTH: Ear discharge: see HPI  R: NEGATIVE for significant cough or shortness of breath  CV: NEGATIVE for chest pain, palpitations or new or worsening peripheral edema  GI: NEGATIVE for new onset or worsening nausea, vomiting or diarrhea  M: NEGATIVE for claudication, myalgias  N: NEGATIVE for weakness, dizziness, syncope, headaches  P: NEGATIVE for changes in mood or affect          Physical Exam:     Vitals:    09/14/18 1351   BP: 90/56   Pulse: 67   Temp: 98.1  F (36.7  C)   TempSrc: Oral   SpO2: 97%   Weight: 111 lb 12.8 oz (50.7 kg)   Height: 5' 2.6\" (159 cm)     Body mass index is 20.06 kg/(m^2).    GENERAL APPEARANCE: healthy, alert and no distress,  HENT: ear canals and TM's normal and nose and mouth without ulcers or lesions  RESP: lungs clear to auscultation - no rales, rhonchi or " wheezes  CV: regular rate and rhythm,  and no murmur, click,  rub or gallop  MS: extremities normal- no gross deformities noted  PSYCH: mood and affect normal/bright      Assessment and Plan     1. Hyperlipidemia, unspecified hyperlipidemia type  Recently seen in clinic approximately 1 month ago and was started on high intensity atorvastatin given ASCVD score of 10.2%.  Has taken medication multiple times since clinic but has been noncompliant.  Discussed importance of medication in preventing risk of stroke and heart disease which patient understands.  Due to forgetting medication, provided a medication box to help patient remind himself to take medication.  Patient acknowledges and will start using pillbox.  No concerns with taking atorvastatin at this time.  -Continue using Atorvastatin 40 mg daily  -Follow-up as needed    2. Currently attempting to quit using tobacco  Has been smoking approximately 1 pack per year for the last 52 years since age of 10.  Currently interested in quitting and would like nicotine patches.  Denies any new stressors or issues with trying to quit smoking.  - nicotine (NICODERM CQ) 14 MG/24HR 24 hr patch; Place 1 patch onto the skin every 24 hours  Dispense: 30 patch; Refill: 1    Options for treatment and follow-up care were reviewed with the patient and/or guardian. Zafar DIANA Spencer and/or guardian engaged in the decision making process and verbalized understanding of the options discussed and agreed with the final plan.    Alessio Baker MD  [facility]  Pager: [phone]    Precepted today with: Foster Burgess MD

## 2018-09-15 NOTE — PROGRESS NOTES
I have personally reviewed the history and examination as documented by Dr. Baker.  I was present during key portions of the visit and agree with the assessment and plan as documented for 63 yr old male with hyprlipidemia, tobacco use here for follow-up, complaint of ear discharge.  Ear exam benign. Cont statin.  Nicotine patch for smoking cessation.  Precautions given. Anticipatory guidance given.     Foster Burgess MD  September 15, 2018  4:26 PM

## 2018-10-03 ENCOUNTER — COMMUNICATION - HEALTHEAST (OUTPATIENT)
Dept: PULMONOLOGY | Facility: OTHER | Age: 63
End: 2018-10-03

## 2018-11-08 DIAGNOSIS — Z00.00 HEALTHCARE MAINTENANCE: ICD-10-CM

## 2018-11-08 NOTE — TELEPHONE ENCOUNTER
Message to physician:     Date of last visit: 9/14/2018     Date of next visit if scheduled: Visit date not found       Last Comprehensive Metabolic Panel:  No results found for: NA, POTASSIUM, CHLORIDE, CO2, ANIONGAP, GLC, BUN, CR, GFRESTIMATED, TAM    BP Readings from Last 3 Encounters:   09/14/18 90/56   08/10/18 96/62   07/27/18 103/66       No results found for: A1C             Please complete refill and CLOSE ENCOUNTER.  Closing the encounter signifies the refill is complete.

## 2018-11-11 RX ORDER — ATORVASTATIN CALCIUM 40 MG/1
40 TABLET, FILM COATED ORAL DAILY
Qty: 30 TABLET | Refills: 3 | Status: SHIPPED | OUTPATIENT
Start: 2018-11-11 | End: 2019-02-14

## 2018-11-16 ENCOUNTER — TRANSFERRED RECORDS (OUTPATIENT)
Dept: HEALTH INFORMATION MANAGEMENT | Facility: CLINIC | Age: 63
End: 2018-11-16

## 2018-11-16 ENCOUNTER — OFFICE VISIT - HEALTHEAST (OUTPATIENT)
Dept: OTOLARYNGOLOGY | Facility: CLINIC | Age: 63
End: 2018-11-16

## 2018-11-16 DIAGNOSIS — H61.303 ACQUIRED STENOSIS OF BOTH EXTERNAL EAR CANALS: ICD-10-CM

## 2018-12-26 DIAGNOSIS — Z00.00 HEALTHCARE MAINTENANCE: ICD-10-CM

## 2019-01-10 ENCOUNTER — OFFICE VISIT (OUTPATIENT)
Dept: FAMILY MEDICINE | Facility: CLINIC | Age: 64
End: 2019-01-10
Payer: COMMERCIAL

## 2019-01-10 DIAGNOSIS — K92.1 BLOOD IN STOOL: Primary | ICD-10-CM

## 2019-01-10 RX ORDER — AZITHROMYCIN 250 MG/1
TABLET, FILM COATED ORAL
Qty: 6 TABLET | Refills: 0 | Status: SHIPPED | OUTPATIENT
Start: 2019-01-10 | End: 2019-02-14

## 2019-01-10 NOTE — PROGRESS NOTES
HPI:       Zafar Spencer is a 63 year old  male who presents for possible blood in the stool.   Possible cough. The cough has been going on for 1-2 months, although the history is questionable, he is here with his partner, who says the cough has been pesent for two weeks. .  No fever, no sore throat.  Partner has asthma, which has been made worse by the infection.      Also mentions that the patient has had blood in the stool The patient doesn't think it was blood, but the partner think it was blood. Will check his stool for blood and culture and O and P.      Patient wondered if he has lost too much weight. Reviewed weight over the last several months. Would not pursue further intervention at this time, since loss is only two pounds. No other changes noted.            Wt Readings from Last 4 Encounters:   09/14/18 50.7 kg (111 lb 12.8 oz)   08/10/18 50.3 kg (111 lb)   07/27/18 51.3 kg (113 lb)   07/19/18 49.5 kg (109 lb 3.2 oz)                PMHX:   Current Medications:   Current Outpatient Medications   Medication Sig Dispense Refill     aspirin (ASA) 81 MG tablet Take 1 tablet (81 mg) by mouth daily 90 tablet 1     atorvastatin (LIPITOR) 40 MG tablet Take 1 tablet (40 mg) by mouth daily 30 tablet 3     bisacodyl (DULCOLAX) 5 MG EC tablet To use for Colonoscopy prep follow instruction provided by clinic (Patient not taking: Reported on 1/10/2019) 2 tablet 0     nicotine (NICODERM CQ) 14 MG/24HR 24 hr patch Place 1 patch onto the skin every 24 hours (Patient not taking: Reported on 1/10/2019) 30 patch 1     Polyethylene Glycol POWD To use for Colonoscopy prep follow instruction provided by clinic (Patient not taking: Reported on 1/10/2019) 238 g 0     simethicone (MYLICON) 80 MG chewable tablet To use for Colonoscopy prep follow instruction provided by clinic (Patient not taking: Reported on 1/10/2019) 1 tablet 0       Existing Problems  Patient Active Problem List   Diagnosis     Tobacco use     Pulmonary  nodules     Benign prostatic hyperplasia with urinary frequency     Pulmonary emphysema, unspecified emphysema type (H)     HLD (hyperlipidemia)     H/O colonoscopy     Conductive hearing loss, bilateral       Allergies:  Allergies   Allergen Reactions     Latex Rash     Penicillins Rash       Previous labs:  Lab Results   Component Value Date    CHOL 199 07/13/2018    HDL 48 07/13/2018               Review of Systems:    CONSTITUTIONAL: no fatigue, no unexpected change in weight  SKIN: no worrisome rashes, no worrisome moles, no worrisome lesions  EYES: no acute vision problems or changes  ENT: no ear problems, no mouth problems, no throat problems  RESP: no shortness of breath  CV: no chest pain, no palpitations, no new or worsening peripheral edema  GI: no nausea, no vomiting, no constipation, no diarrhea          Physical Exam:     There were no vitals filed for this visit.  There is no height or weight on file to calculate BMI.    GENERAL:alert, well hydrated, no distress  EYES: Eyes grossly normal to inspection, extraocular movements - intact, and PERRL  HENT: ear canals- normal; TMs- normal; Nose- normal; Mouth- no ulcers, no lesions  NECK: no tenderness, no adenopathy, no asymmetry, no masses, no stiffness; thyroid- normal to palpation  RESP: lungs clear to auscultation - no rales, no rhonchi, no wheezes  CV: regular rates and rhythm, normal S1 S2, no S3 or S4 and no murmur, no click or rub -               Labs and Procedures     Office Visit on 07/13/2018   Component Date Value Ref Range Status     HIV Antigen/Antibody 07/13/2018 Negative  Negative Final     Hepatitis C Antibody Screen 07/13/2018 Negative  Negative Final     Cholesterol 07/13/2018 199  <=199 mg/dL Final     Triglycerides 07/13/2018 95  <=149 mg/dL Final     HDL Cholesterol 07/13/2018 48  >=40 mg/dL Final     LDL Cholesterol Calculated 07/13/2018 132* <=129 mg/dL Final     Fasting? 07/13/2018 No   Final              Assessment and Plan      1. Bronchitis with persistent cough. Will provide azithromicin at request of patient and partner.   2. Possible blood in stool - will evaluate for blood, and infection.       Options for treatment and follow-up care were reviewed with the patient and/or guardian. Zafar Spencer and/or guardian engaged in the decision making process and verbalized understanding of the options discussed and agreed with the final plan.    Amadeo Johnston MD

## 2019-01-15 ENCOUNTER — TELEPHONE (OUTPATIENT)
Dept: FAMILY MEDICINE | Facility: CLINIC | Age: 64
End: 2019-01-15

## 2019-01-15 DIAGNOSIS — K92.1 BLOOD IN STOOL: ICD-10-CM

## 2019-01-15 NOTE — TELEPHONE ENCOUNTER
LM in regards to stool orders, I only received a stool specimen today for O and P, but I noticed there are orders for stool culture and FIT too, which will required two other containers to collect them. Pt can stop back and  those containers her in lab unless he already have it at home and is in process of sending it to us. Patricia, KARLI

## 2019-01-15 NOTE — TELEPHONE ENCOUNTER
Significant other called back; Informed her of message below. She stated she will come to  other containers.

## 2019-01-16 LAB — O+P SPEC MICRO: NORMAL

## 2019-01-17 DIAGNOSIS — K92.1 BLOOD IN STOOL: ICD-10-CM

## 2019-01-17 LAB — HEMOCCULT STL QL IA: POSITIVE

## 2019-01-17 NOTE — LETTER
January 22, 2019      Zafar Spencer  [address]  [facility] [address]        Dear Zafar,    No infectious agent in stool.     Please see below for your test results.    Resulted Orders   Fecal Occult Blood - FIT, iFOB ([facility])   Result Value Ref Range    Occult Blood Scn FIT POSITIVE Negative   Culture, Stool ([facility])   Result Value Ref Range    Culture SEE RESULTS BELOW       Comment:      CULTURE, STOOL   CULTURE RESULTS:    No Salmonella, Shigella, Yersinia, or Campylobacter.      Narrative    Test performed by:  [facility] [address] [place] [address]  All specimens submitted for routine culture tested for Salmonella, Shigella,   Yersinia, Campylobacter, and if applicable, Shiga toxin 1 and 2 producing   Enterohemorrhagic E. coli.       If you have any questions, please call the clinic to make an appointment.    Sincerely,    [facility]

## 2019-01-17 NOTE — RESULT ENCOUNTER NOTE
Please call patient and send results letter  The stool test showed a small amount of blood in stool. Please make an appointment with your provider for further evaluation.

## 2019-01-20 LAB — CULTURE: NORMAL

## 2019-02-14 ENCOUNTER — OFFICE VISIT (OUTPATIENT)
Dept: FAMILY MEDICINE | Facility: CLINIC | Age: 64
End: 2019-02-14
Payer: COMMERCIAL

## 2019-02-14 VITALS
SYSTOLIC BLOOD PRESSURE: 111 MMHG | DIASTOLIC BLOOD PRESSURE: 70 MMHG | TEMPERATURE: 97.8 F | WEIGHT: 113.2 LBS | RESPIRATION RATE: 20 BRPM | OXYGEN SATURATION: 99 % | BODY MASS INDEX: 20.31 KG/M2 | HEART RATE: 77 BPM

## 2019-02-14 DIAGNOSIS — Z00.00 HEALTHCARE MAINTENANCE: ICD-10-CM

## 2019-02-14 DIAGNOSIS — Z72.0 TOBACCO USE: ICD-10-CM

## 2019-02-14 DIAGNOSIS — J43.9 PULMONARY EMPHYSEMA, UNSPECIFIED EMPHYSEMA TYPE (H): Primary | ICD-10-CM

## 2019-02-14 DIAGNOSIS — K64.8 INTERNAL HEMORRHOIDS: ICD-10-CM

## 2019-02-14 PROBLEM — H61.303 ACQUIRED STENOSIS OF BOTH EXTERNAL EAR CANALS: Status: ACTIVE | Noted: 2018-11-16

## 2019-02-14 RX ORDER — ATORVASTATIN CALCIUM 40 MG/1
40 TABLET, FILM COATED ORAL DAILY
Qty: 30 TABLET | Refills: 3 | Status: SHIPPED | OUTPATIENT
Start: 2019-02-14 | End: 2019-05-08

## 2019-02-14 NOTE — PROGRESS NOTES
Preceptor Attestation:   Patient seen, evaluated and discussed with the resident. I have verified the content of the note, which accurately reflects my assessment of the patient and the plan of care.  Supervising Physician:Nigel Edward MD  [facility]

## 2019-02-14 NOTE — PROGRESS NOTES
Assessment and Plan   1. Pulmonary emphysema, unspecified emphysema type (H)  Patient's partner was concerned about bronchitis, but given self-limited symptoms and previous treatment with Azithromycin without improvement will continue to observe for now. O2 at 99%, lungs clear, no fever.    2. Internal hemorrhoids  Reviewed colonoscopy procedure note from ~1 year ago - repeat recommended in 10 years, no abnormalities seen except mild internal hemorrhoids. Given that he only gets BRBPR after hard stools, most likely from the above. Will treat with the below and consider banding if symptoms persist.  - psyllium (METAMUCIL/KONSYL) 58.6 % powder; Take 18 g (1 Tablespoonful) by mouth daily  Dispense: 1620 g; Refill: 3    3. Tobacco use  Still smoking, not interesting in quitting.    Options for treatment and follow-up care were reviewed with the patient and/or guardian. Zafar Spencer and/or guardian engaged in the decision making process and verbalized understanding of the options discussed and agreed with the final plan.    Roverto Spicer MD  [facility], PGY-2    Precepted with: Nigel Edward MD         HPI:   Zafar Spencer is a 63 year old male who presents to clinic today for   Chief Complaint   Patient presents with     Cough     Pt complains of coughing (bronchitis) x 2 mths     Medication Reconciliation     Incomplete Pt did not bring medications     Patient has been having some mild sputum production with cough, but not significantly increased from baseline. No fevers, chills, or difficulty breathing. Has been able to work without problem.    Has intermittent BRBPR, but only after hard stools. No weight loss.         Review of Systems:   A comprehensive 12 point review of systems was negative unless otherwise noted in the HPI.          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    Current Medications  Current Outpatient Medications   Medication Sig Dispense Refill     aspirin (ASA) 81 MG tablet Take 1 tablet (81 mg) by mouth daily 90 tablet 1     psyllium (METAMUCIL/KONSYL) 58.6 % powder Take 18 g (1 Tablespoonful) by mouth daily 1620 g 3     atorvastatin (LIPITOR) 40 MG tablet Take 1 tablet (40 mg) by mouth daily 30 tablet 3     Medication list reviewed and updated.    Social History  Social History     Tobacco Use     Smoking status: Current Every Day Smoker     Packs/day: 0.25     Years: 52.00     Pack years: 13.00     Types: Cigarettes     Smokeless tobacco: Never Used     Tobacco comment: 1/2 pack/day   Substance Use Topics     Alcohol use: No     Drug use: Yes     Frequency: 2.0 times per week     Types: Marijuana     History   Drug Use     Frequency: 2.0 times per week     Types: Marijuana     Allergies  Allergies   Allergen Reactions     Latex Rash     Penicillins Rash     rash     Allergies and Medication Intolerances Updated         Physical Exam:     Vitals:    02/14/19 1438   BP: 111/70   Pulse: 77   Resp: 20   Temp: 97.8  F (36.6  C)   TempSrc: Oral   SpO2: 99%   Weight: 51.3 kg (113 lb 3.2 oz)     Body mass index is 20.31 kg/m .    GENERAL APPEARANCE: alert, appears stated age, no acute distress  HEENT: Eyes grossly normal to inspection, nares normal, and mouth and throat without erythema, ulcers, or lesions, Stenosed ear canals bilaterally  RESP: lungs clear to auscultation - no rales, rhonchi, or wheezes  CV: regular rate and rhythm, no murmur, click, rub, or gallop  ABDOMEN: soft, nontender   MSK: extremities normal, no gross deformities noted, no lower extremity  edema  SKIN: no suspicious lesions or rashes   NEURO: Normal strength and tone, sensory exam grossly normal, mentation appears intact and speech normal  PSYCH: mood and affect normal/bright

## 2019-05-08 DIAGNOSIS — Z00.00 HEALTHCARE MAINTENANCE: ICD-10-CM

## 2019-05-08 NOTE — TELEPHONE ENCOUNTER
Message to physician:     Date of last visit: 2/14/2019     Date of next visit if scheduled: Visit date not found       Last Comprehensive Metabolic Panel:  No results found for: NA, POTASSIUM, CHLORIDE, CO2, ANIONGAP, GLC, BUN, CR, GFRESTIMATED, TAM    BP Readings from Last 3 Encounters:   02/14/19 111/70   09/14/18 90/56   08/10/18 96/62       No results found for: A1C             Please complete refill and CLOSE ENCOUNTER.  Closing the encounter signifies the refill is complete.

## 2019-05-09 RX ORDER — ATORVASTATIN CALCIUM 40 MG/1
40 TABLET, FILM COATED ORAL DAILY
Qty: 30 TABLET | Refills: 3 | Status: SHIPPED | OUTPATIENT
Start: 2019-05-09 | End: 2019-08-05

## 2019-06-19 ENCOUNTER — RECORDS - HEALTHEAST (OUTPATIENT)
Dept: ADMINISTRATIVE | Facility: OTHER | Age: 64
End: 2019-06-19

## 2019-06-19 ENCOUNTER — OFFICE VISIT (OUTPATIENT)
Dept: FAMILY MEDICINE | Facility: CLINIC | Age: 64
End: 2019-06-19
Payer: COMMERCIAL

## 2019-06-19 VITALS
DIASTOLIC BLOOD PRESSURE: 73 MMHG | SYSTOLIC BLOOD PRESSURE: 124 MMHG | RESPIRATION RATE: 16 BRPM | HEART RATE: 61 BPM | OXYGEN SATURATION: 98 % | WEIGHT: 112.6 LBS | TEMPERATURE: 97.8 F | BODY MASS INDEX: 19.95 KG/M2 | HEIGHT: 63 IN

## 2019-06-19 DIAGNOSIS — Z87.891 PERSONAL HISTORY OF TOBACCO USE, PRESENTING HAZARDS TO HEALTH: ICD-10-CM

## 2019-06-19 DIAGNOSIS — Z87.891 PERSONAL HISTORY OF TOBACCO USE: ICD-10-CM

## 2019-06-19 DIAGNOSIS — J30.2 SEASONAL ALLERGIC RHINITIS, UNSPECIFIED TRIGGER: Primary | ICD-10-CM

## 2019-06-19 RX ORDER — FLUTICASONE PROPIONATE 50 MCG
1 SPRAY, SUSPENSION (ML) NASAL DAILY
Qty: 9.9 ML | Refills: 3 | Status: SHIPPED | OUTPATIENT
Start: 2019-06-19 | End: 2020-03-06

## 2019-06-19 ASSESSMENT — MIFFLIN-ST. JEOR: SCORE: 1194.49

## 2019-06-19 NOTE — PROGRESS NOTES
"Pt is a 63 year old male last seen on 2/14/19 by Dr Spicer here today for:     Cough - yellow phlegm  +chills (only if the AC is on)   +congestion  Breathing is ok     Emphysema - usually stable unless he has bronchitis  No inhalers  Smoked weed this am - 2 hits     Tobacco use - 1/2 to 1 ppd -> tried patch/ chantix  Is interested in trying to quit      Patient Active Problem List   Diagnosis Code     Tobacco use Z72.0     Pulmonary nodules R91.8     Benign prostatic hyperplasia with urinary frequency N40.1, R35.0     Pulmonary emphysema, unspecified emphysema type (H) J43.9     HLD (hyperlipidemia) E78.5     H/O colonoscopy Z98.890     Conductive hearing loss, bilateral H90.0     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 H61.303       Current Outpatient Medications   Medication Sig Dispense Refill     aspirin (ASA) 81 MG tablet Take 1 tablet (81 mg) by mouth daily 90 tablet 1     atorvastatin (LIPITOR) 40 MG tablet Take 1 tablet (40 mg) by mouth daily 30 tablet 3     psyllium (METAMUCIL/KONSYL) 58.6 % powder Take 18 g (1 Tablespoonful) by mouth daily (Patient not taking: Reported on 6/19/2019) 1620 g 3      Allergies   Allergen Reactions     Latex Rash     Penicillins Rash     rash        ROS:   Gen- no weight change, no fevers/chills   Head/ Eyes- no blurred vision, no headaches   ENT- see HPI  Cardiac - no palpitations, no chest pain   Respiratory - no shortness of breath , no wheezing   Remainder of ROS negative.     Exam:   /73   Pulse 61   Temp 97.8  F (36.6  C) (Oral)   Resp 16   Ht 1.59 m (5' 2.6\")   Wt 51.1 kg (112 lb 9.6 oz)   SpO2 98%   BMI 20.20 kg/m     Alert and oriented x 3; No acute distress   HEENT: oropharynx clear   Neck: no masses, no lymphadenopathy   Resp: clear to auscultation bilaterally, no wheezing/ronchi   CV: rate/rhythm regular, no murmurs/rubs/gallops   Neuro: no focal deficits   Derm: no rashes     (J30.2) Seasonal allergic rhinitis, unspecified trigger  (primary " encounter diagnosis)  Comment: likely allergic rhinitis component to residual cough; trial of Flonase; precautions given; f/u prn  Plan: fluticasone (FLONASE) 50 MCG/ACT nasal spray          (Z12.2) Encounter for screening for lung cancer  Comment:   Plan: C CT SCAN, CHEST - LUNG CA SCREENING            Foster Burgess MD  June 19, 2019  3:45 PM

## 2019-06-19 NOTE — PATIENT INSTRUCTIONS
Referral for ( TEST )  :      CT Scan-Lung  LOCATION/PLACE/Provider :    [facility]   DATE & TIME :     June 28th at 11:40, arrive at 11:25 am  PHONE :     [phone]  FAX :     [phone]  Appointment made by clinic staff/:    Myesha

## 2019-07-01 ENCOUNTER — TRANSFERRED RECORDS (OUTPATIENT)
Dept: HEALTH INFORMATION MANAGEMENT | Facility: CLINIC | Age: 64
End: 2019-07-01

## 2019-07-01 ENCOUNTER — HOSPITAL ENCOUNTER (OUTPATIENT)
Dept: CT IMAGING | Facility: HOSPITAL | Age: 64
Discharge: HOME | End: 2019-07-01
Attending: FAMILY MEDICINE

## 2019-07-01 DIAGNOSIS — Z87.891 PERSONAL HISTORY OF NICOTINE DEPENDENCE: ICD-10-CM

## 2019-07-01 DIAGNOSIS — R91.8 PULMONARY NODULES: ICD-10-CM

## 2019-07-11 ENCOUNTER — OFFICE VISIT (OUTPATIENT)
Dept: FAMILY MEDICINE | Facility: CLINIC | Age: 64
End: 2019-07-11
Payer: COMMERCIAL

## 2019-07-11 VITALS
WEIGHT: 111 LBS | SYSTOLIC BLOOD PRESSURE: 110 MMHG | HEART RATE: 58 BPM | BODY MASS INDEX: 20.43 KG/M2 | OXYGEN SATURATION: 98 % | DIASTOLIC BLOOD PRESSURE: 70 MMHG | TEMPERATURE: 97.5 F | HEIGHT: 62 IN | RESPIRATION RATE: 18 BRPM

## 2019-07-11 DIAGNOSIS — H93.8X1 SENSATION OF FULLNESS IN RIGHT EAR: ICD-10-CM

## 2019-07-11 DIAGNOSIS — Z72.0 TOBACCO USE: ICD-10-CM

## 2019-07-11 DIAGNOSIS — R05.9 COUGH: ICD-10-CM

## 2019-07-11 DIAGNOSIS — J43.9 PULMONARY EMPHYSEMA, UNSPECIFIED EMPHYSEMA TYPE (H): Primary | ICD-10-CM

## 2019-07-11 RX ORDER — BENZONATATE 100 MG/1
100 CAPSULE ORAL 3 TIMES DAILY PRN
Qty: 30 CAPSULE | Refills: 1 | Status: SHIPPED | OUTPATIENT
Start: 2019-07-11 | End: 2020-02-07

## 2019-07-11 ASSESSMENT — MIFFLIN-ST. JEOR: SCORE: 1175.99

## 2019-07-11 NOTE — PROGRESS NOTES
"       HPI       Zafar Spencer is a 64 year old male with PMH tobacco abuse who presents for:    Cough  Last seen 6/19/19 also for cough, no change since then  Continued tobacco use 1/2 to 1 ppd  Cough worse when laying on back or side  Worse at night  No snoring or mouth breathing  No shortness of breath  Yellow phlegm  No fevers  What concerns pt the most is that his cough wakes his wife up from sleep at night    CT chest 7/1/19 low-dose for lung cancer screening reviewed: small stable nodule.  No significant change of apical scarring, emphysema and pulmonary hyperexpansion.  Similar mild bronchiectasis.  Retained airway secretions.    Hearing loss/ sensation of ear fullness  Chronic, bilateral  Never fitted for hearing aides, too expensive  Has put drops in ears in the past to help with ear wax  Feels sense of fullness R ear that he associates with ear wax now    SH: Tobacco 1/2 to 1 ppd as above  Marijuana \"a little bit\", last was 2 days  Lives with wife who is also being seen today  Works as  at [employer]    No  was required for this visit.    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       Current Outpatient Medications   Medication Sig Dispense Refill     aspirin (ASA) 81 MG tablet Take 1 tablet (81 mg) by mouth daily 90 tablet 1     atorvastatin (LIPITOR) 40 MG tablet Take 1 tablet (40 mg) by mouth daily 30 tablet 3     fluticasone (FLONASE) 50 MCG/ACT nasal spray Spray 1 spray into both nostrils daily 9.9 mL 3     psyllium (METAMUCIL/KONSYL) 58.6 % powder Take 18 g (1 Tablespoonful) by mouth daily (Patient not taking: Reported on 6/19/2019) 1620 g 3          Allergies   Allergen Reactions     Latex Rash     " "Penicillins Rash     rash       No results found for this or any previous visit (from the past 24 hour(s)).       Review of Systems:   Complete 10-point ROS negative except as per HPI           Physical Exam:     Vitals:    07/11/19 1548   BP: 110/70   Pulse: 58   Resp: 18   Temp: 97.5  F (36.4  C)   TempSrc: Oral   SpO2: 98%   Weight: 50.3 kg (111 lb)   Height: 1.58 m (5' 2.21\")     Body mass index is 20.17 kg/m .    GENERAL: alert and no distress  HEENT: no visible ear wax in external ear canal bilaterally, anatomy of ear canal made tympanic membrane difficult to visualize  NECK: no tenderness, no adenopathy, no asymmetry, no masses, no stiffness; thyroid- normal to palpation  RESP: moderate air movement, lungs clear to auscultation - no rales, no rhonchi, no wheezes  CV: regular rates and rhythm, normal S1 S2, no S3 or S4 and no murmur, no click or rub  ABDOMEN: soft, no tenderness  MS: extremities- no gross deformities noted, no edema    Assessment and Plan     Zafar was seen today for cough and medication reconciliation.    Diagnoses and all orders for this visit:    Pulmonary emphysema, unspecified emphysema type (H)  Cough  Tobacco abuse  Chronic cough.  No current concern for infection.  Advised quitting tobacco.  Likely COPD, although no prior spirometry or formal PFTs.  New prescription for bronchodilator.  -     tiotropium (SPIRIVA RESPIMAT) 2.5 MCG/ACT inhaler; Inhale 2 puffs into the lungs daily  -     benzonatate (TESSALON) 100 MG capsule; Take 1 capsule (100 mg) by mouth 3 times daily as needed for cough    Sensation of fullness in right ear  -     carbamide peroxide (DEBROX) 6.5 % otic solution; Place 5 drops into the right ear daily as needed for other (ear fullness)    No prior use of inhaler.  Follow-up in 3-5 days with Dr. Martines, PharmD, for inhaler training.    Options for treatment and follow-up care were reviewed with the patient and/or guardian. Zafar Spencer and/or guardian " engaged in the decision making process and verbalized understanding of the options discussed and agreed with the final plan.  Staffed with Dr Eunice Fox.  Piper Barney MD  [facility] PGY-2

## 2019-07-15 NOTE — PROGRESS NOTES
Preceptor Attestation:  Patient's case reviewed and discussed with Piper Barney MD resident and I evaluated the patient. I agree with written assessment and plan of care.  Supervising Physician:  BASSAM COREAS MD  [facility]

## 2019-07-18 ENCOUNTER — TELEPHONE (OUTPATIENT)
Dept: FAMILY MEDICINE | Facility: CLINIC | Age: 64
End: 2019-07-18

## 2019-07-18 DIAGNOSIS — J43.9 PULMONARY EMPHYSEMA, UNSPECIFIED EMPHYSEMA TYPE (H): Primary | ICD-10-CM

## 2019-07-18 NOTE — TELEPHONE ENCOUNTER
Prior Authorization needed on:  7/18/19    Medication:  SPIRIVA Dose:  2.5MCG    Pharmacy confirmed as   [facility] [address] [place] [facility] - [address] [address]  Phone: [phone] Fax: [phone]  : Yes    Insurance Name:  NA  Insurance Phone: NA  Insurance Patient ID: [id number]    Alternatives Suggested:  INCRUSE ELLIPTA 62.5MCG    NURIS BLACKBURN July 18, 2019 at 2:44 PM

## 2019-08-05 DIAGNOSIS — Z00.00 HEALTHCARE MAINTENANCE: ICD-10-CM

## 2019-08-06 RX ORDER — ATORVASTATIN CALCIUM 40 MG/1
40 TABLET, FILM COATED ORAL DAILY
Qty: 30 TABLET | Refills: 0 | Status: SHIPPED | OUTPATIENT
Start: 2019-08-06 | End: 2021-05-11

## 2019-08-16 DIAGNOSIS — J43.9 PULMONARY EMPHYSEMA, UNSPECIFIED EMPHYSEMA TYPE (H): ICD-10-CM

## 2019-12-06 DIAGNOSIS — J43.9 PULMONARY EMPHYSEMA, UNSPECIFIED EMPHYSEMA TYPE (H): ICD-10-CM

## 2020-02-03 DIAGNOSIS — Z00.00 HEALTHCARE MAINTENANCE: ICD-10-CM

## 2020-02-07 ENCOUNTER — OFFICE VISIT (OUTPATIENT)
Dept: FAMILY MEDICINE | Facility: CLINIC | Age: 65
End: 2020-02-07
Payer: MEDICARE

## 2020-02-07 VITALS
HEIGHT: 63 IN | TEMPERATURE: 97.8 F | OXYGEN SATURATION: 91 % | WEIGHT: 115 LBS | BODY MASS INDEX: 20.38 KG/M2 | HEART RATE: 61 BPM | DIASTOLIC BLOOD PRESSURE: 71 MMHG | RESPIRATION RATE: 18 BRPM | SYSTOLIC BLOOD PRESSURE: 107 MMHG

## 2020-02-07 DIAGNOSIS — H60.502 ACUTE OTITIS EXTERNA OF LEFT EAR, UNSPECIFIED TYPE: ICD-10-CM

## 2020-02-07 DIAGNOSIS — Z72.0 TOBACCO USE: Primary | ICD-10-CM

## 2020-02-07 DIAGNOSIS — Z71.6 ENCOUNTER FOR SMOKING CESSATION COUNSELING: ICD-10-CM

## 2020-02-07 DIAGNOSIS — J43.9 PULMONARY EMPHYSEMA, UNSPECIFIED EMPHYSEMA TYPE (H): ICD-10-CM

## 2020-02-07 DIAGNOSIS — J40 BRONCHITIS: ICD-10-CM

## 2020-02-07 RX ORDER — OFLOXACIN 3 MG/ML
5 SOLUTION AURICULAR (OTIC) DAILY
Qty: 5 ML | Refills: 0 | Status: SHIPPED | OUTPATIENT
Start: 2020-02-07 | End: 2020-03-06

## 2020-02-07 RX ORDER — NICOTINE 21 MG/24HR
1 PATCH, TRANSDERMAL 24 HOURS TRANSDERMAL EVERY 24 HOURS
Qty: 30 PATCH | Refills: 1 | Status: SHIPPED | OUTPATIENT
Start: 2020-02-07 | End: 2021-01-19

## 2020-02-07 RX ORDER — BENZONATATE 100 MG/1
100 CAPSULE ORAL 3 TIMES DAILY PRN
Qty: 30 CAPSULE | Refills: 0 | Status: SHIPPED | OUTPATIENT
Start: 2020-02-07 | End: 2020-03-06

## 2020-02-07 ASSESSMENT — MIFFLIN-ST. JEOR: SCORE: 1206.77

## 2020-02-07 NOTE — PROGRESS NOTES
Assessment and Plan     1. Tobacco use  2. Encounter for smoking cessation counseling  Patient has tried Chantix in the past but did not like the sleep he got on this. Will trial nicotine replacement. 10 minutes spent talking about cessation.   - nicotine (NICORETTE) 4 MG lozenge; Place 1 lozenge (4 mg) inside cheek as needed for smoking cessation  Dispense: 90 tablet; Refill: 3  - nicotine (NICODERM CQ) 14 MG/24HR 24 hr patch; Place 1 patch onto the skin every 24 hours  Dispense: 30 patch; Refill: 1      3. Bronchitis  No wheeze. Likely smoking is exacerbating his cough. At this point do not think he needs any steroids.   - benzonatate (TESSALON) 100 MG capsule; Take 1 capsule (100 mg) by mouth 3 times daily as needed for cough  Dispense: 30 capsule; Refill: 0    4. Acute otitis externa of left ear, unspecified type  Interesting ear canals - able to maneuver right canal to visualize the TM but left is limited d/t white discharge and pain.   - ofloxacin (FLOXIN) 0.3 % otic solution; Place 5 drops Into the left ear daily For 10 days  Dispense: 5 mL; Refill: 0    Options for treatment and follow-up care were reviewed with the patient and/or guardian. Zafar Spencer and/or guardian engaged in the decision making process and verbalized understanding of the options discussed and agreed with the final plan.    Kendrick Brink MD   [facility]  Pager #: [phone]    Precepted today with: Dr. Becker           HPI:       Zafar Spencer is a 64 year old male brought in at wife who presents for concern for bronchitis.     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       Current Outpatient Medications   Medication Sig Dispense Refill     aspirin (ASA) 81 MG tablet Take 1 tablet (81 mg) by mouth daily 90 tablet 1     atorvastatin (LIPITOR) 40 MG tablet Take 1 tablet (40 mg) by mouth daily 30 tablet 0     carbamide peroxide (DEBROX) 6.5 % otic solution Place 5 drops into the right ear daily as needed for other (ear fullness) 14.8 mL 1     fluticasone (FLONASE) 50 MCG/ACT nasal spray Spray 1 spray into both nostrils daily 9.9 mL 3     umeclidinium (INCRUSE ELLIPTA) 62.5 MCG/INH inhaler Inhale 1 puff into the lungs daily 1 Inhaler 2       Social History     Tobacco Use     Smoking status: Current Every Day Smoker     Packs/day: 0.25     Years: 52.00     Pack years: 13.00     Types: Cigarettes     Smokeless tobacco: Never Used     Tobacco comment: 1/2 pack/day   Substance Use Topics     Alcohol use: No     Drug use: Yes     Frequency: 2.0 times per week     Types: Marijuana            Allergies   Allergen Reactions     Latex Rash     Penicillins Rash     rash       No results found for this or any previous visit (from the past 24 hour(s)).         Review of Systems:   C: NEGATIVE for fatigue, unexpected change in weight  E: NEGATIVE for acute vision problems or changes  R: POSITIVE for significant cough no shortness of breath  CV: NEGATIVE for chest pain, palpitations or new or worsening peripheral edema  P: NEGATIVE for changes in mood or affect     Complete ROS negative unless noted above or in HPI       Physical Exam:     Vitals:    02/07/20 1417   BP: 107/71   Pulse: 61   Resp: 18   Temp: 97.8  F (36.6  C)   TempSrc: Oral   SpO2: 91%   Weight: 52.2 kg (115 lb)   Height: 1.6 m (5' 3\")     Body mass index is 20.37 kg/m .    GENERAL APPEARANCE: alert " and no acute distress, quiet and soft spoken  PSYCH: mentation appears normal and affect normal/bright  EAR: Right TM normal with odd shaped ear canal, unable to visualize left TM d/t canal shape and pain - white discharge in left  RESP: Diminished sounds throughout but no wheeze  CV: regular rate and rhythm, normal S1 S2, no S3 or S4 and no murmur, click or rub -  EXT: no cyanosis or edema in lower extremities  SKIN: no venous stasis changes

## 2020-02-07 NOTE — PATIENT INSTRUCTIONS
Patient Education     External Ear Infection (Adult)    External otitis (also called  swimmer s ear ) is an infection in the ear canal. It is often caused by bacteria or fungus. It can occur a few days after water gets trapped in the ear canal (from swimming or bathing). It can also occur after cleaning too deeply in the ear canal with a cotton swab or other object. Sometimes, hair care products get into the ear canal and cause this problem.  Symptoms can include pain, fever, itching, redness, drainage, or swelling of the ear canal. Temporary hearing loss may also occur.  Home care    Do not try to clean the ear canal. This can push pus and bacteria deeper into the canal.    Use prescribed ear drops as directed. These help reduce swelling and fight the infection. If an ear wick was placed in the ear canal, apply drops right onto the end of the wick. The wick will draw the medicine into the ear canal even if it is swollen closed.    A cotton ball may be loosely placed in the outer ear to absorb any drainage.    You may use acetaminophen or ibuprofen to control pain, unless another medicine was prescribed. Note: If you have chronic liver or kidney disease or ever had a stomach ulcer or GI bleeding, talk to your healthcare provider before taking any of these medicines.    Do not allow water to get into your ear when bathing. Also, don't swim until the infection has cleared.  Prevention    Keep your ears dry. This helps lower the risk of infection. Dry your ears with a towel or hair dryer after getting wet. Also, use ear plugs when swimming.    Do not stick any objects in the ear to remove wax.    If you feel water trapped in your ear, use ear drops right away. You can get these drops over the counter at most drugstores. They work by removing water from the ear canal.  Follow-up care  Follow up with your healthcare provider in 1 week, or as advised.  When to seek medical advice  Call your healthcare provider right away  if any of these occur:    Ear pain becomes worse or doesn t improve after 3 days of treatment    Redness or swelling of the outer ear occurs or gets worse    Headache    Painful or stiff neck    Drowsiness or confusion    Fever of 100.4 F (38 C) or higher, or as directed by your healthcare provider    Seizure  Date Last Reviewed: 10/1/2017    1539-7917 [employer]. [address] [place] [address]. All rights reserved. This information is not intended as a substitute for professional medical care. Always follow your healthcare professional's instructions.

## 2020-02-12 NOTE — PROGRESS NOTES
Preceptor Attestation:   Patient seen, evaluated and discussed with the resident. I have verified the content of the note, which accurately reflects my assessment of the patient and the plan of care.  I was not present for the 10 mins of counseling for tobacco cessation  Supervising Physician:Erika Becker, DO [facility]

## 2020-03-06 ENCOUNTER — OFFICE VISIT (OUTPATIENT)
Dept: FAMILY MEDICINE | Facility: CLINIC | Age: 65
End: 2020-03-06
Payer: MEDICARE

## 2020-03-06 VITALS
HEIGHT: 62 IN | HEART RATE: 69 BPM | DIASTOLIC BLOOD PRESSURE: 69 MMHG | BODY MASS INDEX: 20.8 KG/M2 | WEIGHT: 113 LBS | TEMPERATURE: 98 F | OXYGEN SATURATION: 97 % | RESPIRATION RATE: 16 BRPM | SYSTOLIC BLOOD PRESSURE: 120 MMHG

## 2020-03-06 DIAGNOSIS — H60.502 ACUTE OTITIS EXTERNA OF LEFT EAR, UNSPECIFIED TYPE: ICD-10-CM

## 2020-03-06 DIAGNOSIS — Z72.0 TOBACCO USE: ICD-10-CM

## 2020-03-06 DIAGNOSIS — J43.9 PULMONARY EMPHYSEMA, UNSPECIFIED EMPHYSEMA TYPE (H): ICD-10-CM

## 2020-03-06 DIAGNOSIS — R05.9 COUGH: Primary | ICD-10-CM

## 2020-03-06 DIAGNOSIS — H60.393 INFECTIVE OTITIS EXTERNA, BILATERAL: ICD-10-CM

## 2020-03-06 RX ORDER — OFLOXACIN 3 MG/ML
5 SOLUTION AURICULAR (OTIC) DAILY
Qty: 5 ML | Refills: 0 | Status: SHIPPED | OUTPATIENT
Start: 2020-03-06 | End: 2020-03-19

## 2020-03-06 RX ORDER — DEXAMETHASONE SODIUM PHOSPHATE 1 MG/ML
1-2 SOLUTION/ DROPS OPHTHALMIC 3 TIMES DAILY
Qty: 5 ML | Refills: 0 | Status: SHIPPED | OUTPATIENT
Start: 2020-03-06 | End: 2020-03-19

## 2020-03-06 ASSESSMENT — MIFFLIN-ST. JEOR: SCORE: 1188.19

## 2020-03-06 NOTE — PROGRESS NOTES
"       HPI:       Zafar Spencer is a 64 year old  male who presents for the new concern(s) of    \"My bronchitis\"  -Started to bother him about 1 month ago, started with coughing, doesn't usually cough at day time, just usually more at night  -The phlegm feels like it is coming from the lungs. This is disturbing to his wide more than him  -Does carry hx emphysema, not on an inhaler  -Does smoke tobacco about 1 pack per day, just started using the patch a couple days ago. Smokes THC as well, usually every morning. Last use was this morning, and is high right now  -Has other symptoms of morning sweats  -Denies shortness of breath  -Not currently using flonase, states he never used this  -No formal PFTs or kathi  -Emphysema noted on prior CT scan  -No fevers noted    Ear pain:  -\"Stuff in the ear\" longstanding  -Notices yellow wax  -Thinks hearing is a little decreased         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       Current Outpatient Medications   Medication Sig Dispense Refill     aspirin (ASA) 81 MG tablet Take 1 tablet (81 mg) by mouth daily 90 tablet 1     atorvastatin (LIPITOR) 40 MG tablet Take 1 tablet (40 mg) by mouth daily 30 tablet 0     carbamide peroxide (DEBROX) 6.5 % otic solution Place 5 drops into the right ear daily as needed for other (ear fullness) 14.8 mL 1     fluticasone (FLONASE) 50 MCG/ACT nasal spray Spray 1 spray into both nostrils daily 9.9 mL 3     nicotine (NICODERM CQ) 14 MG/24HR 24 hr patch Place 1 patch onto the skin every 24 hours 30 patch 1     ofloxacin (FLOXIN) 0.3 % otic solution Place 5 drops Into the left ear daily For 10 days 5 mL 0     umeclidinium (INCRUSE ELLIPTA) 62.5 MCG/INH " inhaler Inhale 1 puff into the lungs daily 1 Inhaler 2     benzonatate (TESSALON) 100 MG capsule Take 1 capsule (100 mg) by mouth 3 times daily as needed for cough (Patient not taking: Reported on 3/6/2020) 30 capsule 0     nicotine (NICORETTE) 4 MG lozenge Place 1 lozenge (4 mg) inside cheek as needed for smoking cessation (Patient not taking: Reported on 3/6/2020) 90 tablet 3       Social History     Socioeconomic History     Marital status:      Spouse name: Not on file     Number of children: Not on file     Years of education: Not on file     Highest education level: Not on file   Occupational History     Occupation: [employer]   Social Needs     Financial resource strain: Not on file     Food insecurity:     Worry: Not on file     Inability: Not on file     Transportation needs:     Medical: Not on file     Non-medical: Not on file   Tobacco Use     Smoking status: Current Every Day Smoker     Packs/day: 0.25     Years: 52.00     Pack years: 13.00     Types: Cigarettes     Smokeless tobacco: Never Used     Tobacco comment: 1/2 pack/day   Substance and Sexual Activity     Alcohol use: No     Drug use: Yes     Frequency: 2.0 times per week     Types: Marijuana     Sexual activity: Not on file   Lifestyle     Physical activity:     Days per week: Not on file     Minutes per session: Not on file     Stress: Not on file   Relationships     Social connections:     Talks on phone: Not on file     Gets together: Not on file     Attends Holiness service: Not on file     Active member of club or organization: Not on file     Attends meetings of clubs or organizations: Not on file     Relationship status: Not on file     Intimate partner violence:     Fear of current or ex partner: Not on file     Emotionally abused: Not on file     Physically abused: Not on file     Forced sexual activity: Not on file   Other Topics Concern     Not on file   Social History Narrative     Not on file          Allergies   Allergen  "Reactions     Latex Rash     Penicillins Rash     rash       No results found for this or any previous visit (from the past 24 hour(s)).         Review of Systems:     A 10 point review of systems including constitutional, cardiovascular, respiratory, HEENT, GI, , neurological, MSK, skin, endocrine, is negative unless stated in HPI          Physical Exam:     Vitals:    03/06/20 1138   BP: 120/69   Pulse: 69   Resp: 16   Temp: 98  F (36.7  C)   TempSrc: Oral   SpO2: 97%   Weight: 51.3 kg (113 lb)   Height: 1.585 m (5' 2.4\")     Body mass index is 20.4 kg/m .    GENERAL APPEARANCE: Alert and in no distress, smells of THC  HENT: ear canals appear edematous and erythematous with some white scaling  RESP:Diminished breath sounds BL but no wheezing or crackles  CV: regular rate and rhythm,  and no murmur, click,  rub or gallop  SKIN: no suspicious lesions or rashes  PSYCH: mood and affect normal/bright      Assessment and Plan     1. Cough  2. Pulmonary emphysema, unspecified emphysema type (H)  3. Tobacco use  Cough present for 1 month now. No actual history of kathi/PFT proven COPD, but emphysema noted on CT. I think this is likely obstructive related lung disease, and he has not been using his inhaler as he thought it might make his cough work. We discussed that this will actually help, and he will start using and follow up in one month.    4. Infective otitis externa, bilateral  Canals appear edematous and erythematous, with some scaling. Likely mild infection with repeated trauma from cleaning his ears multiple times per day. Discussed stopping that and treating with abx and steroid. Can see how this improved in one month or recheck earlier if worse.  - ofloxacin (FLOXIN) 0.3 % otic solution; Place 5 drops into both ears daily For 10 days  Dispense: 5 mL; Refill: 0  - dexamethasone (DECADRON) 0.1 % ophthalmic solution; Place 1-2 drops into both ears 3 times daily for 10 days  Dispense: 5 mL; Refill: 0    Options " for treatment and follow-up care were reviewed with the patient and/or guardian. Zafar Spencer and/or guardian engaged in the decision making process and verbalized understanding of the options discussed and agreed with the final plan.    Edy Azevedo MD  [facility] Resident, PGY2      Precepted today with: Alex Roche MD

## 2020-03-09 ENCOUNTER — OFFICE VISIT (OUTPATIENT)
Dept: FAMILY MEDICINE | Facility: CLINIC | Age: 65
End: 2020-03-09
Payer: MEDICARE

## 2020-03-09 VITALS
HEIGHT: 63 IN | DIASTOLIC BLOOD PRESSURE: 71 MMHG | OXYGEN SATURATION: 98 % | RESPIRATION RATE: 18 BRPM | SYSTOLIC BLOOD PRESSURE: 102 MMHG | WEIGHT: 110 LBS | BODY MASS INDEX: 19.49 KG/M2 | HEART RATE: 65 BPM | TEMPERATURE: 97.8 F

## 2020-03-09 DIAGNOSIS — J40 BRONCHITIS: Primary | ICD-10-CM

## 2020-03-09 DIAGNOSIS — R91.8 PULMONARY NODULES: ICD-10-CM

## 2020-03-09 DIAGNOSIS — N50.89 SCROTAL IRRITATION: ICD-10-CM

## 2020-03-09 DIAGNOSIS — R63.4 WEIGHT LOSS: ICD-10-CM

## 2020-03-09 ASSESSMENT — MIFFLIN-ST. JEOR: SCORE: 1177.7

## 2020-03-09 NOTE — PROGRESS NOTES
HPI:   Zafar Spencer is a 64 year old  male who presents for:    Chief Complaint   Patient presents with     Cough     f/u bronchitis. still having some coughs     Medication Reconciliation     complete     Weight Check     concern about weight loss     Cough  - coughing for the last month  - Similar to episodes in the past, 1/year in the winter  - Currently smokes 1 pack of cigarettes per day  - No rhinorrhea, sore throat, abdominal pain, no fevers, Wheezing  - Cough is productive   - Has tried cough drops but has to use many of them to help  - He notes cough syrup has helped in the past    Weight Loss:  - thinks he has had weight loss  - weights recorded below, does not appear he has had objective weight loss  - Patient is up to date on colonoscopy and lung cancer screening  Wt Readings from Last 10 Encounters:   03/09/20 49.9 kg (110 lb)   03/06/20 51.3 kg (113 lb)   02/07/20 52.2 kg (115 lb)   07/11/19 50.3 kg (111 lb)   06/19/19 51.1 kg (112 lb 9.6 oz)   02/14/19 51.3 kg (113 lb 3.2 oz)   09/14/18 50.7 kg (111 lb 12.8 oz)   08/10/18 50.3 kg (111 lb)   07/27/18 51.3 kg (113 lb)   07/19/18 49.5 kg (109 lb 3.2 oz)     Testicular pruritis:  Patient notes he has been having testicular pruritis and irritation for a couple of weeks  He notes it is after masturbating  Doesn't seem to be his penis         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       Current Outpatient Medications   Medication Sig Dispense Refill     aspirin (ASA) 81 MG tablet Take 1 tablet (81 mg) by mouth daily 90 tablet 1     atorvastatin (LIPITOR) 40 MG tablet Take 1 tablet (40 mg) by mouth daily 30 tablet 0     carbamide peroxide (DEBROX) 6.5 % otic  "solution Place 5 drops into the right ear daily as needed for other (ear fullness) 14.8 mL 1     dexamethasone (DECADRON) 0.1 % ophthalmic solution Place 1-2 drops into both ears 3 times daily for 10 days 5 mL 0     nicotine (NICODERM CQ) 14 MG/24HR 24 hr patch Place 1 patch onto the skin every 24 hours 30 patch 1     ofloxacin (FLOXIN) 0.3 % otic solution Place 5 drops into both ears daily For 10 days 5 mL 0     umeclidinium (INCRUSE ELLIPTA) 62.5 MCG/INH inhaler Inhale 1 puff into the lungs daily 1 Inhaler 2       Social History     Tobacco Use     Smoking status: Current Every Day Smoker     Packs/day: 0.25     Years: 52.00     Pack years: 13.00     Types: Cigarettes     Smokeless tobacco: Never Used     Tobacco comment: 1/2 pack/day   Substance Use Topics     Alcohol use: No     Drug use: Yes     Frequency: 2.0 times per week     Types: Marijuana       Social History     Social History Narrative     Not on file       Allergies   Allergen Reactions     Latex Rash     Penicillins Rash     rash       No results found for this or any previous visit (from the past 24 hour(s)).         Review of Systems:    ROS: 10 point ROS neg other than the symptoms noted above in the HPI.         Physical Exam:     Vitals:    03/09/20 1110   BP: 102/71   Pulse: 65   Resp: 18   Temp: 97.8  F (36.6  C)   TempSrc: Oral   SpO2: 98%   Weight: 49.9 kg (110 lb)   Height: 1.59 m (5' 2.6\")     Body mass index is 19.74 kg/m .    General: Alert, well-appearing male in NAD  HEENT: PERRL, moist oral mucus membranes  Pulm: CTA BL, no tachypnea  CV: RRR, no murmur  Abd: soft, NTND, no masses  Uro: testicles are normal with no masses felt, varicocele noted, testicular skin is non erythematous with no rash or swelling, no penile changes    Assessment and Plan   (J40) Bronchitis  (primary encounter diagnosis)  Comment: Similar episode of bronchitis that patient has yearly in winter. He self Identified that he would likely have improved symptoms and " less recurrence if he quits smoking and encouraged patient to do this. He Is not ready to commit to making a plan however. Otherwise advised symptomatic management with cough suppressants. No concern for pneumonia with exam and and HPI today.  Plan: Symptomatic treatment, discussed return precautions     (N50.89) Scrotal irritation  Comment: Possibly 2/2 to masturbation, patient does not use personal lubricants. Recommended scent free personal lubricant. May also be related to varicocele but doubtful as likely has had for years.   Plan:     (R63.4) Weight loss  Comment: No objective weight loss on chart review. Up to date with colona dn lung cancer screening. No symptoms identified for hyperthyroidism, malabsorption, uncontrolled diabetes, depression, dementia, drug related, among others. On further review with patient more of a feeling that patient muscles are less prominent, feels he is weakening. Most likely normal with aging. Recommended weight lifting exercises.  Plan:       Follow up: Follow-up for smoking cessation when ready to make plan  Options for treatment and follow-up care were reviewed with the patient and/or guardian. Zafar Spencer and/or guardian engaged in the decision making process and verbalized understanding of the options discussed and agreed with the final plan.    Dr. Pan Sherman, PGY3    Precepted with: Eunice Fox MD

## 2020-03-12 NOTE — PROGRESS NOTES
Preceptor Attestation:  Patient's case reviewed and discussed with Pan Sherman MD resident and I evaluated the patient. I agree with written assessment and plan of care.  Supervising Physician:  BASSAM COREAS MD  [facility]

## 2020-03-19 ENCOUNTER — VIRTUAL VISIT (OUTPATIENT)
Dept: FAMILY MEDICINE | Facility: CLINIC | Age: 65
End: 2020-03-19
Payer: MEDICARE

## 2020-03-19 DIAGNOSIS — H60.393 INFECTIVE OTITIS EXTERNA, BILATERAL: Primary | ICD-10-CM

## 2020-03-19 DIAGNOSIS — H60.543 ECZEMA OF BOTH EXTERNAL EARS: ICD-10-CM

## 2020-03-19 DIAGNOSIS — Z71.6 ENCOUNTER FOR SMOKING CESSATION COUNSELING: ICD-10-CM

## 2020-03-19 RX ORDER — NEOMYCIN SULFATE, POLYMYXIN B SULFATE, HYDROCORTISONE 3.5; 10000; 1 MG/ML; [USP'U]/ML; MG/ML
3 SOLUTION/ DROPS AURICULAR (OTIC) 4 TIMES DAILY
Qty: 10 ML | Refills: 1 | Status: SHIPPED | OUTPATIENT
Start: 2020-03-19 | End: 2021-01-19

## 2020-03-19 RX ORDER — BENZONATATE 100 MG/1
CAPSULE ORAL
COMMUNITY
Start: 2020-02-07 | End: 2021-04-09

## 2020-03-19 NOTE — PROGRESS NOTES
"Family Medicine Telephone Visit Note  {If PCS start the visit, read the following information verbatim to the patient - DELETE WHEN CONSENT IS OBTAINED}  {Help text -click delete when highlighted-IF NONENGLISH SPEAKING PATIENT  USE LANGUAGE LINE-ACCESS INFORMATION AT THE BOTTOM OF THIS MESSAGE. }           Telephone Visit Consent   Patient was verbally read the following and verbal consent was obtained.  \"I understand that I may revoke this request for a phone visit at any time.  This consent will automatically  3 months from the signed date and time.\"    Name person giving consent:  {PATIENT/ FAMILY MEMBER, :980747}   Date verbal consent given:  {today's Date or more:2443544}  Time verbal consent given:  { :2036986}    {HELP TEXT - DELETE when completed.  PCS - Go to \"VIRTUAL\" tab to update CC, allergies, medication list, assessments and NOLAN UP refills.  If no PCS, provider to update meds. Verbally read PHQ9, GAD7, ACT, etc if indicated}    {PCS Stop Here - DELETE This Text}  No chief complaint on file.    Current Outpatient Medications   Medication Sig Dispense Refill     aspirin (ASA) 81 MG tablet Take 1 tablet (81 mg) by mouth daily 90 tablet 1     atorvastatin (LIPITOR) 40 MG tablet Take 1 tablet (40 mg) by mouth daily 30 tablet 0     carbamide peroxide (DEBROX) 6.5 % otic solution Place 5 drops into the right ear daily as needed for other (ear fullness) 14.8 mL 1     nicotine (NICODERM CQ) 14 MG/24HR 24 hr patch Place 1 patch onto the skin every 24 hours 30 patch 1     ofloxacin (FLOXIN) 0.3 % otic solution Place 5 drops into both ears daily For 10 days 5 mL 0     umeclidinium (INCRUSE ELLIPTA) 62.5 MCG/INH inhaler Inhale 1 puff into the lungs daily 1 Inhaler 2     Allergies   Allergen Reactions     Latex Rash     Penicillins Rash     rash         {If Provider starts visit, do consent and meds as above using \"VIRTUAL tab.\" For calls - Use Lightwire shayan to call without revealing your cell phone #. Or use " "*67 before dialing the 10 digit #. DELETE THIS TEXT.}          HPI   Patients name: Zafar  Appointment start time:  { :8200397}    {Superlists ():754305}     used via Language Line or similar service.  number: ***      {If visit is for gender affirmative hormones follow up, open <dot>SMITRANSPHONEHPI  below}      Assessment and Plan   {Diag Picklist:751048}    Refilled medications that would be required in the next 3 months.     After Visit Information:  {avs options:902425}    Appointment end time: { :8200397}  This is a telephone visit that took *** minutes.  Clinician location:  ***    ESTUARDO COATES        { Help text -click delete when highlighted-BILL FOR THIS VISIT! Details of BILLING WILL BE WORKED OUT LATER BY CODING }      {[employer] Language Line Access  (Help text- F2 to highlight then hit delete)    Dial [phone]    Answer  Welcome to the Language Line Services.  Please enter your six digit client ID.    Enter   [id number]  Answer  \"For Setswana, press 1.  For all other languages, press 2.\"   they  will ask you to say the name of the language.  Enter  Press either 1 or 2.  Answer  \"Please enter your personal code \"  Enter  60  Answer  \"Please enter your personal code \"  Enter   60  Your personal code needs to be entered twice.  This isn't a typo.}    "

## 2020-03-19 NOTE — PROGRESS NOTES
Preceptor Attestation:  I discussed the patient with the resident. I have verified the content of the note, which accurately reflects my assessment of the patient and the plan of care.   Supervising Physician:  Eunice Fox MD.

## 2020-04-30 ENCOUNTER — OFFICE VISIT (OUTPATIENT)
Dept: FAMILY MEDICINE | Facility: CLINIC | Age: 65
End: 2020-04-30
Payer: MEDICARE

## 2020-04-30 VITALS
DIASTOLIC BLOOD PRESSURE: 65 MMHG | HEART RATE: 66 BPM | RESPIRATION RATE: 18 BRPM | SYSTOLIC BLOOD PRESSURE: 99 MMHG | TEMPERATURE: 98.3 F | OXYGEN SATURATION: 96 %

## 2020-04-30 DIAGNOSIS — J31.0 CHRONIC NONALLERGIC RHINITIS: Primary | ICD-10-CM

## 2020-04-30 RX ORDER — IPRATROPIUM BROMIDE 42 UG/1
2 SPRAY, METERED NASAL 4 TIMES DAILY
Qty: 15 ML | Refills: 3 | Status: SHIPPED | OUTPATIENT
Start: 2020-04-30 | End: 2021-01-19

## 2020-04-30 NOTE — PROGRESS NOTES
I have personally reviewed the history and examination as documented by Dr. Rosenstein.  I was present during key portions of the visit and agree with the assessment and plan as documented for 64 yr old male with COPD here for cough. COVID precautions taken. Will tx for allergic rhinitis. Precautions given. Anticipatory guidance given.     Foster Burgess MD  April 30, 2020  3:47 PM

## 2020-04-30 NOTE — PATIENT INSTRUCTIONS
Use the atrovent spray 4x/day    Especially use it at night before bed to help with the phlegm when you wake up

## 2020-04-30 NOTE — PROGRESS NOTES
Assessment and Plan       Zafar Spencer is a 64 year old male with past medical hx including smoking, emphysema who presented to clinic today for cough likely PND    Chronic nonallergic rhinitis  Intermittent cough related to collection of mucus early in mornings or when not regularly clearing throat. Chronic issues for years, no concern for COVID19 but seen with isolation due to cough complaint. Likely some relation to emphysema and smoking, though has decreased smoking and using incruse ellipta daily. Recommended continue to decrease smoking and will send with atrovent nasal spray.   - ipratropium (ATROVENT) 0.06 % nasal spray  Dispense: 15 mL; Refill: 3    Options for treatment and follow-up care were reviewed with the patient. Zafar Spencer engaged in the decision making process and verbalized understanding of the options discussed and agreed with the final plan.    Benjamin Rosenstein, MD, MA  [facility] [place]: [phone]    Precepted today with: Foster Burgess MD         HPI:       Chief Complaint   Patient presents with     Nasal Congestion     at night time which causes him to cough to get the phlem out. no fevers reported, no SOB, chronic issue     Medication Reconciliation     needs attention       Zafar Spencer is a 64 year old  male with pmhx including smoking, COPD  who presents for the following    PND/Cough (patient seen in full PPE)  -Intermittent cough -- ongoing for 10 years. Has not done a lot for it.  -Occurs if he hasn't talked for a while, has to cough/clear throat to help him talk better  -Denies coughing when laying down flat, when going to bed  -But has to cough a lot and spit up phlegm in the morning when he wakes up  -No cough throughout the day  -No cough while talking to me  -Will cough with more strenuous activity like jogging  -Denies worsening SOB, has mild chronic SOB related to emphysema  -Smoking: About 1/2 PPD, down from 1 PPD. Total 54 years of smoking  --Recently  cut down. Denies any change in cough with cutting back  -Feels phlegm build up as he smokes during the day  -Never used nasal spray for this  -Uses incruse ellipta daily in the mornings         Review of Systems:       5 point ROS negative except as noted above in HPI, including Gen., Resp, CV, GI &  system review.            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        Medications   Current Outpatient Medications   Medication Sig Dispense Refill     aspirin (ASA) 81 MG tablet Take 1 tablet (81 mg) by mouth daily 90 tablet 1     atorvastatin (LIPITOR) 40 MG tablet Take 1 tablet (40 mg) by mouth daily 30 tablet 0     carbamide peroxide (DEBROX) 6.5 % otic solution Place 5 drops into the right ear daily as needed for other (ear fullness) 14.8 mL 1     neomycin-polymyxin-hydrocortisone (CORTISPORIN) 3.5-42442-2 otic solution Place 3 drops into both ears 4 times daily 10 mL 1     nicotine (NICODERM CQ) 14 MG/24HR 24 hr patch Place 1 patch onto the skin every 24 hours 30 patch 1     umeclidinium (INCRUSE ELLIPTA) 62.5 MCG/INH inhaler Inhale 1 puff into the lungs daily 1 Inhaler 2     benzonatate (TESSALON) 100 MG capsule           Social History      History   Smoking Status     Current Every Day Smoker     Packs/day: 0.25     Years: 52.00     Types: Cigarettes   Smokeless Tobacco     Never Used     Comment: 1/2 pack/day             Allergies   Allergen Reactions     Latex Rash     Penicillins Rash     rash     Physical Exam        Physical Exam:     Vitals:    04/30/20 1437   BP: 99/65   Pulse: 66   Resp: 18   Temp: 98.3  F (36.8  C)   TempSrc: Oral   SpO2: 96%     There is no height or weight on file to calculate  BMI.    General: Thin male, seated comfortably, NAD  HEENT:  - Eyes: Corneas clear, sclera without injection, no discharge, EOMI, PERRLA  - Nose: Nares patent, no rhinorrhea, no congestion  - Throat: Oropharynx clear without lesions, tonsils normal, posterior pharynx without erythema, swelling, or exudate   CV: RRR, normal S1/S2, no murmurs/rubs/gallops  Pulm: Normal WOB, lungs CTAB, no wheezes or crackles, good air movement. No cough during visit  Neuro: Alert, answering questions appropriately, normal thought processes; lisp; normal gait     There are no discontinued medications.    Patient Instructions   There are no Patient Instructions on file for this visit.        This note was completed with the assistance of dictation software. Typos and word substitution-errors are expected and unintended.

## 2020-06-01 DIAGNOSIS — J43.9 PULMONARY EMPHYSEMA, UNSPECIFIED EMPHYSEMA TYPE (H): ICD-10-CM

## 2020-06-15 ENCOUNTER — TELEPHONE (OUTPATIENT)
Dept: FAMILY MEDICINE | Facility: CLINIC | Age: 65
End: 2020-06-15

## 2020-06-15 DIAGNOSIS — Z72.0 TOBACCO USE: Primary | ICD-10-CM

## 2020-06-15 NOTE — TELEPHONE ENCOUNTER
pharmacy request aspirin 81mg and unable to que up refill in medication list.     [facility]    [facility] [id number]

## 2020-06-26 ENCOUNTER — OFFICE VISIT (OUTPATIENT)
Dept: FAMILY MEDICINE | Facility: CLINIC | Age: 65
End: 2020-06-26
Payer: MEDICARE

## 2020-06-26 VITALS
RESPIRATION RATE: 22 BRPM | WEIGHT: 115.6 LBS | SYSTOLIC BLOOD PRESSURE: 94 MMHG | OXYGEN SATURATION: 96 % | HEIGHT: 63 IN | DIASTOLIC BLOOD PRESSURE: 60 MMHG | BODY MASS INDEX: 20.48 KG/M2 | TEMPERATURE: 98.3 F | HEART RATE: 67 BPM

## 2020-06-26 DIAGNOSIS — Z02.9 ADMINISTRATIVE ENCOUNTER: Primary | ICD-10-CM

## 2020-06-26 DIAGNOSIS — J43.9 PULMONARY EMPHYSEMA, UNSPECIFIED EMPHYSEMA TYPE (H): ICD-10-CM

## 2020-06-26 ASSESSMENT — MIFFLIN-ST. JEOR: SCORE: 1209.49

## 2020-06-26 NOTE — LETTER
RETURN TO WORK/SCHOOL FORM    6/26/2020    Re: Zafar Spencer  1955      To Whom It May Concern:     Zafar Spencer was seen in clinic today. He has a history of chronic bronchitis and emphysema. Based on these medical conditions and his age, he is at high risk for illness and complications if he contracts COVID-19. It is my medical recommendation that he be provided with a mask and non-latex gloves for personal protection at work as well as be offered shifts at low volume times.                Kasandra Kathleen MD  6/26/2020 2:47 PM

## 2020-06-26 NOTE — PROGRESS NOTES
Subjective:   Zafar Spencer is a 64 year old year old male who presents to clinic today for the following health issues:    Admin encounter: Here with his wife to request a letter for work. Zafar works as a  at the [employer]. 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 Wife is requesting a letter saying that he should not have to go back to work until the pandemic is over as he is high risk for illness due to his emphysema and age.     EXAM: LOW DOSE LUNG CANCER SCREENING CT CHEST  LOCATION: [facility]  DATE/TIME: 7/1/2019 10:39 AM    INDICATION: Lung cancer screening. High risk patient with greater than 30 pack year smoking history.  COMPARISON: 7/14/2017.    TECHNIQUE: Low-dose lung cancer screening noncontrast CT chest. Dose reduction techniques were used.     FINDINGS:  LUNGS AND PLEURA: No significant change of apical scarring, emphysema and pulmonary hyperexpansion. Similar mild bronchiectasis. Retained airway secretions. Stable small pulmonary nodules. Negative pleural spaces.  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       Current Outpatient Medications   Medication Sig Dispense Refill     aspirin (ASA) 81 MG EC tablet Take 1 tablet (81 mg) by mouth daily 90 tablet 0     atorvastatin (LIPITOR) 40 MG tablet Take 1 tablet (40 mg) by mouth daily 30 tablet 0     benzonatate (TESSALON) 100 MG capsule        carbamide peroxide (DEBROX) 6.5 % otic solution Place 5 drops into the right ear daily as needed for other (ear fullness) 14.8 mL 1     ipratropium (ATROVENT) 0.06 % nasal spray Spray 2 sprays into both nostrils 4  "times daily 15 mL 3     neomycin-polymyxin-hydrocortisone (CORTISPORIN) 3.5-78446-5 otic solution Place 3 drops into both ears 4 times daily 10 mL 1     nicotine (NICODERM CQ) 14 MG/24HR 24 hr patch Place 1 patch onto the skin every 24 hours 30 patch 1     umeclidinium (INCRUSE ELLIPTA) 62.5 MCG/INH inhaler Inhale 1 puff into the lungs daily 30 each 2     Social History     Tobacco Use     Smoking status: Current Every Day Smoker     Packs/day: 0.25     Years: 52.00     Pack years: 13.00     Types: Cigarettes     Smokeless tobacco: Never Used     Tobacco comment: 1/2 pack/day   Substance Use Topics     Alcohol use: No     Drug use: Yes     Frequency: 2.0 times per week     Types: Marijuana     Allergies   Allergen Reactions     Latex Rash     Penicillins Rash     rash       Review of Systems:     Constitutional, HEENT, cardiovascular, pulmonary, GI, musculoskeletal, neuro, skin, and psych systems are negative, except as otherwise noted.     Objective:     BP 94/60   Pulse 67   Temp 98.3  F (36.8  C) (Oral)   Resp 22   Ht 1.6 m (5' 3\")   Wt 52.4 kg (115 lb 9.6 oz)   SpO2 96%   BMI 20.48 kg/m    Body mass index is 20.48 kg/m .  GENERAL APPEARANCE: healthy, alert and no distress,  EYES: Eyes grossly normal to inspection,  PERRL  RESP: lungs clear to auscultation - no rales, rhonchi or wheezes  CV: regular rate and rhythm,  and no murmur, click,  rub or gallop  MS: extremities normal- no gross deformities noted  SKIN: no suspicious lesions or rashes  NEURO: Normal strength and tone, sensory exam grossly normal, mentation appears intact and speech normal  PSYCH: mood and affect normal/bright    Assessment & Plan:     1. Administrative encounter  2. Pulmonary emphysema, unspecified emphysema type (H)  Discussed with patient and wife that I am unable to write that patient cannot work due to his high risk conditions, however I can write a letter requesting that his employer provide the following accommodations to " decrease his risk of darcy COVID-19: mask at all times, non-latex gloves (patient is allergic to latex), and offer shifts where there is a low volume of shoppers/overnight when the exposure to others is low.     Patient is due for a COPD follow up visit with his PCP with repeat annual lung cancer screening in the next 1-2 months.       Options for treatment and follow-up care were reviewed with the patient and/or guardian. Zafar Spencer and/or guardian engaged in the decision making process and verbalized understanding of the options discussed and agreed with the final plan.    Kasandra Kathleen MD  [facility] Medicine Resident    Precepted with: Edita Jung MD

## 2020-06-29 NOTE — PROGRESS NOTES
Preceptor Attestation:  Patient seen and discussed with the resident..  I agree with written assessment and plan of care.  Supervising Physician:  Cheryl Jung MD  [facility]

## 2020-07-16 ENCOUNTER — COMMUNICATION - HEALTHEAST (OUTPATIENT)
Dept: PULMONOLOGY | Facility: OTHER | Age: 65
End: 2020-07-16

## 2020-08-26 ENCOUNTER — COMMUNICATION - HEALTHEAST (OUTPATIENT)
Dept: PULMONOLOGY | Facility: OTHER | Age: 65
End: 2020-08-26

## 2020-09-24 ENCOUNTER — COMMUNICATION - HEALTHEAST (OUTPATIENT)
Dept: PULMONOLOGY | Facility: OTHER | Age: 65
End: 2020-09-24

## 2020-09-29 DIAGNOSIS — J43.9 PULMONARY EMPHYSEMA, UNSPECIFIED EMPHYSEMA TYPE (H): ICD-10-CM

## 2020-09-29 NOTE — TELEPHONE ENCOUNTER
Message to physician: See contact    Date of last visit: 6/26/2020     Date of next visit if scheduled: Visit date not found       Last Comprehensive Metabolic Panel:  No results found for: NA, POTASSIUM, CHLORIDE, CO2, ANIONGAP, GLC, BUN, CR, GFRESTIMATED, TAM    BP Readings from Last 3 Encounters:   06/26/20 94/60   04/30/20 99/65   03/09/20 102/71       No results found for: A1C             Please complete refill and CLOSE ENCOUNTER.  Closing the encounter signifies the refill is complete.

## 2020-10-05 DIAGNOSIS — Z72.0 TOBACCO USE: ICD-10-CM

## 2020-10-05 NOTE — TELEPHONE ENCOUNTER
Message to physician: [facility]    Date of last visit: 06/26/20    Date of next visit if scheduled: none    Last Comprehensive Metabolic Panel:  No results found for: NA, POTASSIUM, CHLORIDE, CO2, ANIONGAP, GLC, BUN, CR, GFRESTIMATED, TAM    BP Readings from Last 3 Encounters:   06/26/20 94/60   04/30/20 99/65   03/09/20 102/71       No results found for: A1C             Please complete refill and CLOSE ENCOUNTER.  Closing the encounter signifies the refill is complete.

## 2020-10-23 ENCOUNTER — TELEPHONE (OUTPATIENT)
Dept: FAMILY MEDICINE | Facility: CLINIC | Age: 65
End: 2020-10-23

## 2020-10-23 ENCOUNTER — OFFICE VISIT (OUTPATIENT)
Dept: FAMILY MEDICINE | Facility: CLINIC | Age: 65
End: 2020-10-23
Payer: MEDICARE

## 2020-10-23 VITALS
SYSTOLIC BLOOD PRESSURE: 119 MMHG | HEART RATE: 64 BPM | TEMPERATURE: 98 F | BODY MASS INDEX: 19.63 KG/M2 | WEIGHT: 110.8 LBS | OXYGEN SATURATION: 98 % | RESPIRATION RATE: 18 BRPM | HEIGHT: 63 IN | DIASTOLIC BLOOD PRESSURE: 78 MMHG

## 2020-10-23 DIAGNOSIS — Z74.1 UNABLE TO BUDGET FINANCES: ICD-10-CM

## 2020-10-23 DIAGNOSIS — R31.0 GROSS HEMATURIA: ICD-10-CM

## 2020-10-23 DIAGNOSIS — J43.9 PULMONARY EMPHYSEMA, UNSPECIFIED EMPHYSEMA TYPE (H): ICD-10-CM

## 2020-10-23 DIAGNOSIS — Z12.5 SCREENING FOR PROSTATE CANCER: ICD-10-CM

## 2020-10-23 DIAGNOSIS — R63.4 WEIGHT LOSS: Primary | ICD-10-CM

## 2020-10-23 DIAGNOSIS — Z72.0 TOBACCO USE: ICD-10-CM

## 2020-10-23 DIAGNOSIS — Z12.2 ENCOUNTER FOR SCREENING FOR LUNG CANCER: ICD-10-CM

## 2020-10-23 LAB
% GRANULOCYTES: 74.2 %G (ref 40–75)
ALBUMIN SERPL-MCNC: 4 G/DL (ref 3.2–4.5)
ALP SERPL-CCNC: 54 U/L (ref 40–150)
ALT SERPL-CCNC: 17 U/L (ref 0–45)
AST SERPL-CCNC: 31 U/L (ref 0–55)
BILIRUB SERPL-MCNC: 0.8 MG/DL (ref 0.2–1.3)
BILIRUBIN UR: NEGATIVE MG/DL
BLOOD UR: ABNORMAL MG/DL
BUN SERPL-MCNC: 13 MG/DL (ref 7–30)
CALCIUM SERPL-MCNC: 9.9 MG/DL (ref 8.5–10.4)
CHLORIDE SERPLBLD-SCNC: 103 MMOL/L (ref 94–109)
CLARITY, URINE: CLEAR
CO2 SERPL-SCNC: 27 MMOL/L (ref 20–32)
COLOR UR: YELLOW
CREAT SERPL-MCNC: 0.8 MG/DL (ref 0.8–1.5)
EGFR CALCULATED (BLACK REFERENCE): >90 ML/MIN
EGFR CALCULATED (NON BLACK REFERENCE): >90 ML/MIN
GLUCOSE SERPL-MCNC: 117 MG/DL (ref 60–109)
GLUCOSE URINE: NEGATIVE
GRANULOCYTES #: 6.1 K/UL (ref 1.6–8.3)
HCT VFR BLD AUTO: 44 % (ref 40–53)
HEMOGLOBIN: 13.9 G/DL (ref 13.3–17.7)
KETONES UR QL: NEGATIVE MG/DL
LEUKOCYTE ESTERASE UR: NEGATIVE
LYMPHOCYTES # BLD AUTO: 1.7 K/UL (ref 0.8–5.3)
LYMPHOCYTES NFR BLD AUTO: 20.2 %L (ref 20–48)
MCH RBC QN AUTO: 31.3 PG (ref 26.5–35)
MCHC RBC AUTO-ENTMCNC: 31.6 G/DL (ref 32–36)
MCV RBC AUTO: 99.2 FL (ref 78–100)
MID #: 0.5 K/UL (ref 0–2.2)
MID %: 5.6 %M (ref 0–20)
NITRITE UR QL STRIP: NEGATIVE MG/DL
PH UR STRIP: 5 [PH] (ref 4.5–8)
PLATELET # BLD AUTO: 312 K/UL (ref 150–450)
POTASSIUM SERPL-SCNC: 4.5 MMOL/L (ref 3.4–5.3)
PROT SERPL-MCNC: 7.3 G/DL (ref 6.6–8.8)
PROTEIN UR: NEGATIVE MG/DL
PSA SERPL-MCNC: 1.1 NG/ML (ref 0–4.5)
RBC # BLD AUTO: 4.44 M/UL (ref 4.4–5.9)
SODIUM SERPL-SCNC: 140 MMOL/L (ref 133–144)
SP GR UR STRIP: >=1.03 (ref 1–1.03)
TSH SERPL DL<=0.05 MIU/L-ACNC: 1.27 UIU/ML (ref 0.3–5)
UROBILINOGEN UR STRIP-ACNC: ABNORMAL E.U./DL
WBC # BLD AUTO: 8.2 K/UL (ref 4–11)

## 2020-10-23 PROCEDURE — 99214 OFFICE O/P EST MOD 30 MIN: CPT | Performed by: FAMILY MEDICINE

## 2020-10-23 PROCEDURE — 80053 COMPREHEN METABOLIC PANEL: CPT | Performed by: FAMILY MEDICINE

## 2020-10-23 PROCEDURE — 81003 URINALYSIS AUTO W/O SCOPE: CPT | Performed by: FAMILY MEDICINE

## 2020-10-23 PROCEDURE — 85025 COMPLETE CBC W/AUTO DIFF WBC: CPT | Performed by: FAMILY MEDICINE

## 2020-10-23 PROCEDURE — 36415 COLL VENOUS BLD VENIPUNCTURE: CPT | Performed by: FAMILY MEDICINE

## 2020-10-23 ASSESSMENT — MIFFLIN-ST. JEOR: SCORE: 1182.72

## 2020-10-23 NOTE — PROGRESS NOTES
Chief Complaint   Patient presents with     Ear Problem     Ongoing ear infection     Referral     Referral for lung cancer            HPI:       Zafar Spencer is a 65 year old  male with a significant past medical history of tobacco use who presents for follow up of concern(s) listed below    1. Weight loss: He is down about 5 lbs from his last visit, but he has been this low before. He remains quite active as working in [employer] services at the [employer]. He eats when he is hungry, but his appetite is low.  Wt Readings from Last 2 Encounters:   10/23/20 50.3 kg (110 lb 12.8 oz)   06/26/20 52.4 kg (115 lb 9.6 oz)     7/2019: had a low-dose CT scan for lung cancer and was negative    2. Bleeding: patient feels it is due to his hemorrhoids that were noted in 2018. His significant other was concerned he was having blood in his urine    8/2018: Colonoscopy- internal hemorrhoids, no polyps, return in 2028 for next colonoscopy    3, Financial strain: Has been getting help thru [facility] for his rent, but concerned about finances    He has had a flu shot         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       Current Outpatient Medications   Medication Sig Dispense Refill     aspirin (ASA) 81 MG EC tablet Take 1 tablet (81 mg) by mouth daily 90 tablet 0     atorvastatin (LIPITOR) 40 MG tablet Take 1 tablet (40 mg) by mouth daily 30 tablet 0     benzonatate (TESSALON) 100 MG capsule        carbamide peroxide (DEBROX) 6.5 % otic solution Place 5 drops into the right ear daily as needed for other (ear fullness) 14.8 mL 1     ipratropium (ATROVENT) 0.06 % nasal spray Spray 2 sprays into both nostrils 4 times daily 15 mL 3      neomycin-polymyxin-hydrocortisone (CORTISPORIN) 3.5-81908-2 otic solution Place 3 drops into both ears 4 times daily 10 mL 1     nicotine (NICODERM CQ) 14 MG/24HR 24 hr patch Place 1 patch onto the skin every 24 hours 30 patch 1     umeclidinium (INCRUSE ELLIPTA) 62.5 MCG/INH inhaler Inhale 1 puff into the lungs daily 30 each 2       Social History     Socioeconomic History     Marital status:      Spouse name: Not on file     Number of children: Not on file     Years of education: Not on file     Highest education level: Not on file   Occupational History     Occupation: [employer]   Social Needs     Financial resource strain: Not on file     Food insecurity     Worry: Not on file     Inability: Not on file     Transportation needs     Medical: Not on file     Non-medical: Not on file   Tobacco Use     Smoking status: Current Every Day Smoker     Packs/day: 0.25     Years: 52.00     Pack years: 13.00     Types: Cigarettes     Smokeless tobacco: Never Used     Tobacco comment: 1/2 pack/day   Substance and Sexual Activity     Alcohol use: No     Drug use: Yes     Frequency: 2.0 times per week     Types: Marijuana     Sexual activity: Not on file   Lifestyle     Physical activity     Days per week: Not on file     Minutes per session: Not on file     Stress: Not on file   Relationships     Social connections     Talks on phone: Not on file     Gets together: Not on file     Attends Sikh service: Not on file     Active member of club or organization: Not on file     Attends meetings of clubs or organizations: Not on file     Relationship status: Not on file     Intimate partner violence     Fear of current or ex partner: Not on file     Emotionally abused: Not on file     Physically abused: Not on file     Forced sexual activity: Not on file   Other Topics Concern     Not on file   Social History Narrative     Not on file          Allergies   Allergen Reactions     Latex Rash     Penicillins Rash     rash        Results for orders placed or performed in visit on 10/23/20 (from the past 24 hour(s))   Comprehensive Metabolic Panel ([facility]) - results <1hr Protocol   Result Value Ref Range    Glucose 117.0 (H) 60.0 - 109.0 mg/dL    Urea Nitrogen 13.0 7.0 - 30.0 mg/dL    Creatinine 0.8 0.8 - 1.5 mg/dL    Sodium 140.0 133.0 - 144.0 mmol/L    Potassium 4.5 3.4 - 5.3 mmol/L    Chloride 103.0 94.0 - 109.0 mmol/L    Carbon Dioxide 27.0 20.0 - 32.0 mmol/L    Calcium 9.9 8.5 - 10.4 mg/dL    Protein Total 7.3 6.6 - 8.8 g/dL    Albumin 4.0 3.2 - 4.5 g/dL    Alkaline Phosphatase 54.0 40.0 - 150.0 U/L    ALT 17.0 0.0 - 45.0 U/L    AST 31.0 0.0 - 55.0 U/L    Bilirubin Total 0.8 0.2 - 1.3 mg/dL    eGFR Calculated (Non Black Reference) >90 >60.0 mL/min    eGFR Calculated (Black Reference) >90 >60.0 mL/min   CBC with Diff Plt ([facility])   Result Value Ref Range    WBC 8.2 4.0 - 11.0 K/uL    Lymphocytes # 1.7 0.8 - 5.3 K/uL    % Lymphocytes 20.2 20.0 - 48.0 %L    Mid # 0.5 0.0 - 2.2 K/uL    Mid % 5.6 0.0 - 20.0 %M    GRANULOCYTES # 6.1 1.6 - 8.3 K/uL    % Granulocytes 74.2 40.0 - 75.0 %G    RBC 4.44 4.40 - 5.90 M/uL    Hemoglobin 13.9 13.3 - 17.7 g/dL    Hematocrit 44.0 40.0 - 53.0 %    MCV 99.2 78.0 - 100.0 fL    MCH 31.3 26.5 - 35.0 pg    MCHC 31.6 (L) 32.0 - 36.0 g/dL    Platelets 312.0 150.0 - 450.0 K/uL   Urinalysis, Micro If ([facility])   Result Value Ref Range    Specific Gravity Urine >=1.030 1.005 - 1.030    pH Urine 5.0 4.5 - 8.0    Leukocyte Esterase UR Negative NEGATIVE    Nitrite Urine Negative NEGATIVE mg/dL    Protein UR Negative NEGATIVE mg/dL    Glucose Urine Negative NEGATIVE    Ketones Urine Negative NEGATIVE mg/dL    Urobilinogen mg/dL 0.2 E.U./dL 0.2 E.U./dL E.U./dL    Bilirubin UR Negative NEGATIVE mg/dL    Blood UR Trace-intact (A) NEGATIVE mg/dL    Color Urine Yellow     Clarity, urine Clear     Narrative    QNS to perform a urine micro.  LK, CMA            Review of Systems:   E: NEGATIVE for acute vision problems or  "changes  R: NEGATIVE for significant cough or shortness of breath  CV: NEGATIVE for chest pain, palpitations or new or worsening peripheral edema  P: NEGATIVE for changes in mood or affect          Physical Exam:     Vitals:    10/23/20 1039   BP: 119/78   Pulse: 64   Resp: 18   Temp: 98  F (36.7  C)   TempSrc: Oral   SpO2: 98%   Weight: 50.3 kg (110 lb 12.8 oz)   Height: 1.6 m (5' 3\")     Body mass index is 19.63 kg/m .    GENERAL APPEARANCE: healthy, alert and no distress,  HENT: ear canals and TM's normal and nose and mouth without ulcers or lesions  NECK: no adenopathy, no asymmetry, masses, or scars and thyroid normal to palpation  RESP: lungs clear to auscultation - no rales, rhonchi or wheezes  CV: regular rate and rhythm,  and no murmur, click,  rub or gallop  ABDOMEN: soft, nontender, without hepatosplenomegaly or masses  MS: extremities normal- no gross deformities noted      Assessment and Plan     1. Weight loss  - Comprehensive Metabolic Panel ([facility]) - results <1hr Protocol  - TSH  Sensitive ([facility])    2. Gross hematuria  - Urinalysis, Micro If ([facility])  - CBC with Diff Plt ([facility])    Although on further review it sounds like he is having hemorrhoidal bleeding and hemorrhoids were noted on a previous colonoscopy    3. Screening for prostate cancer  - PSA Screening ([facility])    4. Encounter for screening for lung cancer  - CT Chest Lung Cancer Scrn Low Dose wo    5. Tobacco use  - CT Chest Lung Cancer Scrn Low Dose wo    6. Pulmonary emphysema, unspecified emphysema type (H)  Continue with medication regimen    7. Unable to budget finances  Care coordinator will help with resources and food sources        Options for treatment and follow-up care were reviewed with the patient and/or guardian. Zafar Spencer and/or guardian engaged in the decision making process and verbalized understanding of the options discussed and agreed with the final plan.    Erika Becker, DO          "

## 2020-10-26 ENCOUNTER — RECORDS - HEALTHEAST (OUTPATIENT)
Dept: ADMINISTRATIVE | Facility: OTHER | Age: 65
End: 2020-10-26

## 2020-10-26 NOTE — TELEPHONE ENCOUNTER
"Per Provider request Face to face visit with Yovani and Myesha (significant other)  to discuss resources and apartment \"safety concerns\".  Per report, they are having some difficulty's dealing with \"disruptive neighbor and her Children\". Reports kid's are always \"banging on their door\" coming into their apartment and helping themselves to food and anything else, they are feel vulnerable and unsafe at this time and would like assistance with making the family \"stop\". During the conversation both yovani and myesha report they have had a long term relationship with this neighbor and have been providing childcare to her children on a at need basis for $20.00 each time. Discussed setting up boundary's, not opening the door and learning to say no, both are agreeable with this. Food resource, county information and [place]'s for daily hot meals provided. Consider canceling [employer], phone and cell services, information provided for [employer]. Try to be more diligent about bank account balance to avoid overdraft fees. Already receiving [facility] assistance, would like assistance finding free vehicle repair services and lastly they are in need of assistance with \"organizing\" their home as they report being hoarders and have not been able to keep up with cleaning.  Denies being cited or ordered to clean up apartment however, reports  did come last year and deemed apartment unsafe and requested clean up efforts and was to return in a month but due to Covid-19 he has not yet returned for new inspection.    No immediate family are available to assist them, they had 3 children however reports losing custody for \"reasons not clear\". I will look into vehicle repair and clean up assistance, it has been explained that due to Covid-19 resources have been limited. Patient verbalized understanding and will consider suggestions provided and understands it will take some time to look for these resources. Nothing in " addition is needed at this time.

## 2020-10-27 ENCOUNTER — TELEPHONE (OUTPATIENT)
Dept: FAMILY MEDICINE | Facility: CLINIC | Age: 65
End: 2020-10-27

## 2020-10-27 DIAGNOSIS — R31.29 MICROSCOPIC HEMATURIA: Primary | ICD-10-CM

## 2020-10-27 NOTE — LETTER
October 28, 2020      Zafar Spencer  [address]  [place] [address]        Dear Zafar,     All lab values with normal, except for a trace amount of blood in urine. Therefore, I would like to repeat a UA in 2 weeks.    If you have any questions, please call the clinic to make an appointment.    Sincerely,    Kodak Pryor MD

## 2020-10-27 NOTE — TELEPHONE ENCOUNTER
[facility] phone call message- patient requesting results:    Test: Lab    Date of test: 10/23/2020    Additional Comments: Significant other states she and patient would like to know what results for lab work are. Patient is with the caller. Please call and advise.     OK to leave a message on voice mail? Yes    Primary language: English      needed? No    Call taken on October 27, 2020 at 3:23 PM by Elisa Lenz

## 2020-10-28 NOTE — TELEPHONE ENCOUNTER
Please call patient with results. All lab values with normal, except for a trace amount of blood in urine. Therefore, I would like to repeat a UA in 2 weeks.

## 2020-11-06 ENCOUNTER — HOSPITAL ENCOUNTER (OUTPATIENT)
Dept: CT IMAGING | Facility: HOSPITAL | Age: 65
Discharge: HOME | End: 2020-11-06
Attending: FAMILY MEDICINE

## 2020-11-06 DIAGNOSIS — Z87.891 PERSONAL HISTORY OF TOBACCO USE: ICD-10-CM

## 2020-11-06 DIAGNOSIS — Z12.2 ENCOUNTER FOR SCREENING FOR LUNG CANCER: ICD-10-CM

## 2020-11-06 DIAGNOSIS — Z72.0 TOBACCO USE: ICD-10-CM

## 2020-12-28 ENCOUNTER — RECORDS - HEALTHEAST (OUTPATIENT)
Dept: ADMINISTRATIVE | Facility: OTHER | Age: 65
End: 2020-12-28

## 2021-01-04 DIAGNOSIS — Z72.0 TOBACCO USE: ICD-10-CM

## 2021-01-05 ENCOUNTER — OFFICE VISIT (OUTPATIENT)
Dept: FAMILY MEDICINE | Facility: CLINIC | Age: 66
End: 2021-01-05
Payer: MEDICARE

## 2021-01-05 VITALS
TEMPERATURE: 97.4 F | HEART RATE: 66 BPM | DIASTOLIC BLOOD PRESSURE: 72 MMHG | WEIGHT: 113.8 LBS | HEIGHT: 63 IN | SYSTOLIC BLOOD PRESSURE: 120 MMHG | RESPIRATION RATE: 16 BRPM | BODY MASS INDEX: 20.16 KG/M2 | OXYGEN SATURATION: 99 %

## 2021-01-05 DIAGNOSIS — S82.301D FRACTURE OF DISTAL END OF TIBIA WITH FIBULA WITH ROUTINE HEALING, RIGHT, CLOSED: Primary | ICD-10-CM

## 2021-01-05 DIAGNOSIS — S82.831D FRACTURE OF DISTAL END OF TIBIA WITH FIBULA WITH ROUTINE HEALING, RIGHT, CLOSED: Primary | ICD-10-CM

## 2021-01-05 PROCEDURE — 99215 OFFICE O/P EST HI 40 MIN: CPT | Mod: 25 | Performed by: FAMILY MEDICINE

## 2021-01-05 PROCEDURE — 27786 TREATMENT OF ANKLE FRACTURE: CPT | Performed by: FAMILY MEDICINE

## 2021-01-05 ASSESSMENT — ENCOUNTER SYMPTOMS: CONSTITUTIONAL NEGATIVE: 1

## 2021-01-05 ASSESSMENT — MIFFLIN-ST. JEOR: SCORE: 1193.06

## 2021-01-05 NOTE — PATIENT INSTRUCTIONS
Wear your boot for the next 3 weeks. May transition back to shoes at that time.    Return to clinic in 6 weeks for recheck and X-rays.

## 2021-01-05 NOTE — PROGRESS NOTES
Assessment and Plan     1. Fracture of distal end of tibia with fibula with routine healing, right, closed  Spent significant time with the patient reviewing his injury, expected healing time, and helping him fit and wear his boot. He is very confused about his follow up with ortho and has no plans to return to them. He is happy to follow with me. He should wear his CAM boot for the next 3 weeks. Follow up in mid to end Feb for repeat XR. May return to work just needs to wear the boot.  - CLOSED TX DISTAL FIBULA FX W/O MANIPULATION    52 minutes spent on the date of the encounter doing chart review, history and exam, documentation, and further activities as noted above.    Options for treatment and follow-up care were reviewed with the patient and/or guardian. Zafar Spencer and/or guardian engaged in the decision making process and verbalized understanding of the options discussed and agreed with the final plan. I answered all of their questions.    Alex Roche III, MD, FAAFP  [facility]  01/05/21 1:13 PM           HPI:       Zafar Spencer is a 65 year old  male  Patient presents with:  ER F/U: fell and fractured right ankle  Medication Reconciliation: needs attention    Patient slipped and fell on the ice on 24 Dec 2020 and had immediate ankle pain. Went to ER, was diagnosed with a fibular fracture, placed in a boot, and followed up with ortho. Per his report, he saw ortho and they didn't tell him much but he needs to wear the boot and doesn't need to go back. He is having lots of pain wearing the boot and wonders if he needs a new one. Would rather be wearing a shoe. Mostly has pain at and above the ankle. Pain is better than day of injury.    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 Needs a note.         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       Current Outpatient Medications   Medication Sig Dispense Refill     aspirin (ASA) 81 MG EC tablet Take 1 tablet (81 mg) by mouth daily 90 tablet 3     atorvastatin (LIPITOR) 40 MG tablet Take 1 tablet (40 mg) by mouth daily 30 tablet 0     benzonatate (TESSALON) 100 MG capsule        umeclidinium (INCRUSE ELLIPTA) 62.5 MCG/INH inhaler Inhale 1 puff into the lungs daily 30 each 2     carbamide peroxide (DEBROX) 6.5 % otic solution Place 5 drops into the right ear daily as needed for other (ear fullness) (Patient not taking: Reported on 1/5/2021) 14.8 mL 1     ipratropium (ATROVENT) 0.06 % nasal spray Spray 2 sprays into both nostrils 4 times daily (Patient not taking: Reported on 1/5/2021) 15 mL 3     neomycin-polymyxin-hydrocortisone (CORTISPORIN) 3.5-55628-1 otic solution Place 3 drops into both ears 4 times daily (Patient not taking: Reported on 1/5/2021) 10 mL 1     nicotine (NICODERM CQ) 14 MG/24HR 24 hr patch Place 1 patch onto the skin every 24 hours (Patient not taking: Reported on 1/5/2021) 30 patch 1       Social History     Socioeconomic History     Marital status:      Spouse name: Not on file     Number of children: Not on file     Years of education: Not on file     Highest education level: Not on file   Occupational History     Occupation:    Social Needs     Financial resource strain: Not on file     Food insecurity     Worry: Not on file     Inability: Not on file     Transportation needs     Medical: Not on file     Non-medical: Not on file   Tobacco Use     Smoking status: Current Every Day Smoker     Packs/day: 0.25     Years: 52.00     Pack years: 13.00     Types: Cigarettes     Smokeless tobacco: Never Used     Tobacco comment: 1/2 pack/day   Substance and Sexual Activity     Alcohol use: No     Drug use: Yes      "Frequency: 2.0 times per week     Types: Marijuana     Sexual activity: Not on file   Lifestyle     Physical activity     Days per week: Not on file     Minutes per session: Not on file     Stress: Not on file   Relationships     Social connections     Talks on phone: Not on file     Gets together: Not on file     Attends Zoroastrianism service: Not on file     Active member of club or organization: Not on file     Attends meetings of clubs or organizations: Not on file     Relationship status: Not on file     Intimate partner violence     Fear of current or ex partner: Not on file     Emotionally abused: Not on file     Physically abused: Not on file     Forced sexual activity: Not on file   Other Topics Concern     Not on file   Social History Narrative     Not on file       Allergies   Allergen Reactions     Latex Rash     Penicillins Rash     rash       No results found for this or any previous visit (from the past 24 hour(s)).         Review of Systems:     Review of Systems   Constitutional: Negative.             Physical Exam:     Vitals:    01/05/21 1150   BP: 120/72   BP Location: Left arm   Cuff Size: Adult Regular   Pulse: 66   Resp: 16   Temp: 97.4  F (36.3  C)   TempSrc: Oral   SpO2: 99%   Weight: 51.6 kg (113 lb 12.8 oz)   Height: 1.595 m (5' 2.8\")     Body mass index is 20.29 kg/m .    Physical Exam  Vitals signs and nursing note reviewed.   Constitutional:       General: He is not in acute distress.     Appearance: Normal appearance. He is not ill-appearing, toxic-appearing or diaphoretic.   Pulmonary:      Effort: No respiratory distress.   Musculoskeletal:        Feet:       Comments: No neurovascular compromise before or after application of the CAM walker. Had no pain with walking.   Neurological:      Mental Status: He is alert and oriented to person, place, and time.                 XR Ankle Right 3 or More VWS  12/24/2020  [facility]  Result Impression   There is an oblique fracture " through the distal fibula extending 3.5 cm superior to the tibial plafond. The distal fragment is displaced laterally approximately 2.5 mm compared to the proximal fragment. No other fractures. No dislocation.   NOTE: ABNORMAL REPORT    THE DICTATION ABOVE DESCRIBES AN ABNORMALITY FOR WHICH FOLLOW-UP IS NEEDED.    Result Narrative   EXAM: XR ANKLE RIGHT 3 OR MORE VWS  LOCATION: [facility]  DATE/TIME: 12/24/2020 2:14 PM    INDICATION: Right ankle pain after fall  COMPARISON: None.

## 2021-01-05 NOTE — LETTER
RETURN TO WORK/SCHOOL FORM    1/5/2021    Re: Zafar Spencer  1955      To Whom It May Concern:     Zafar Spencer was seen in clinic today.  He may return to work with walking boot on and without restrictions on 1/6/21.          Restrictions:  None      Alex Roche MD  1/5/2021 12:29 PM

## 2021-01-08 DIAGNOSIS — J43.9 PULMONARY EMPHYSEMA, UNSPECIFIED EMPHYSEMA TYPE (H): ICD-10-CM

## 2021-01-12 RX ORDER — TIOTROPIUM BROMIDE 18 UG/1
18 CAPSULE ORAL; RESPIRATORY (INHALATION) DAILY
Qty: 90 CAPSULE | Refills: 3 | Status: SHIPPED | OUTPATIENT
Start: 2021-01-12 | End: 2021-04-09

## 2021-01-13 ENCOUNTER — TELEPHONE (OUTPATIENT)
Dept: FAMILY MEDICINE | Facility: CLINIC | Age: 66
End: 2021-01-13

## 2021-01-13 NOTE — TELEPHONE ENCOUNTER
----- Message from Kodak Pryor MD sent at 1/12/2021  9:24 PM CST -----  Alt sent in.  ----- Message -----  From: Bushra Mazariegos CMA  Sent: 1/12/2021  10:51 AM CST  To: Kodak Pryor MD    Please prescribe alternate drug for Ellipta or PA required per [facility].

## 2021-01-18 ENCOUNTER — OFFICE VISIT (OUTPATIENT)
Dept: FAMILY MEDICINE | Facility: CLINIC | Age: 66
End: 2021-01-18
Payer: MEDICARE

## 2021-01-18 VITALS
BODY MASS INDEX: 19.94 KG/M2 | OXYGEN SATURATION: 97 % | HEART RATE: 63 BPM | RESPIRATION RATE: 18 BRPM | HEIGHT: 64 IN | DIASTOLIC BLOOD PRESSURE: 73 MMHG | SYSTOLIC BLOOD PRESSURE: 114 MMHG | WEIGHT: 116.8 LBS

## 2021-01-18 DIAGNOSIS — S82.831D CLOSED FRACTURE OF DISTAL END OF RIGHT FIBULA WITH ROUTINE HEALING, UNSPECIFIED FRACTURE MORPHOLOGY, SUBSEQUENT ENCOUNTER: Primary | ICD-10-CM

## 2021-01-18 PROCEDURE — 99213 OFFICE O/P EST LOW 20 MIN: CPT | Mod: GC | Performed by: STUDENT IN AN ORGANIZED HEALTH CARE EDUCATION/TRAINING PROGRAM

## 2021-01-18 RX ORDER — ASPIRIN 325 MG
325 TABLET ORAL EVERY 6 HOURS PRN
Qty: 90 TABLET | Refills: 0 | Status: SHIPPED | OUTPATIENT
Start: 2021-01-18 | End: 2021-01-29

## 2021-01-18 ASSESSMENT — MIFFLIN-ST. JEOR: SCORE: 1225.8

## 2021-01-18 NOTE — PROGRESS NOTES
"  Assessment and Plan     (H42.312L) Closed fracture of distal end of right fibula with routine healing, unspecified fracture morphology, subsequent encounter  (primary encounter diagnosis)  Comment: Mechanical fall in 12/24 with resulting distal fibula fracture.  Using CAM boot.  Employer wants patient fully healed before return.  Applying for unemployment at this time.  Will sign paperwork.  Plan:   -refilled aspirin (ASA) 325 MG tablet  -paperwork completed and scanned      Options for treatment and follow-up care were reviewed with the patient and/or guardian. Zafar Spencer and/or guardian engaged in the decision making process and verbalized understanding of the options discussed and agreed with the final plan.      Kodak Pryor MD    Precepted today with: Dr. Quintanilla           HPI:       Zafar Spencer is a 65 year old  male with a significant past medical history of recent ankle fracture accompanied with partner who presents for follow up of concern(s) listed below    1) letter for unemployment  -broke fibula on left in 12/2020  -patient attempted to return to work, but employer won't let patient return until \"fully healed\"  -applied for unemployment due to this  -still having pain if on feet or walking for sometime (as little as 30 minutes)  -no issues with sensation of affect side foot/toes           Summary of Events:     #Distal fibula fracture  -Mechanical fall with resulting distal fibula fracture on 12/24/20  -Employer won't allow patient to return to work until \"fully healed\"  -unemployment paperwork filled out on 1/19/21         Health Maintenance:     Colon Cancer  Indicated (50-75, 40 or 10 years before 1st degree relative age of diagnosis)? yes  Interval (<10 years): Q10yrs  Last Intervention: August 2018  Due now? No    Lung Cancer  Indicated (50-80 - 30 pack-year current or former within 15 years)? yes  Interval (<Annual): Annual  Last Intervention: Nov 2020 - negative  Due now? " "no    Prostate Cancer  Indicated (50-69 - high risk only)? Yes  Interval (<2 years): No repeats most likely  Last Intervention: 1.1 in Oct 2020  Due now? No    AAA  Indicated (65+ - smoked 100 cigs ever)? yes  Interval: once  Last Intervention: none  Due now? yes  If not done, why? Discuss at future medicare wellness    PHQ-2  Indicated (all adults)? yes  Interval (<6 months): Q6months  Last Intervention: within 6 months  Due now? no    Viral Studies (HIV, HCV)  Indicated? yes  Interval: once  Last Intervention: HIV negative and Hep C negative July 2018  Due now? no    Vaccines  Immunization History   Administered Date(s) Administered     FLU 6-35 months 11/16/2015     Influenza (High Dose) 3 valent vaccine 09/26/2020     Influenza (IIV3) PF 10/19/2012, 11/05/2013, 08/20/2018     Influenza Vaccine IM > 6 months Valent IIV4 11/12/2017, 08/20/2018     Influenza Vaccine, 6+MO IM (QUADRIVALENT W/PRESERVATIVES) 11/16/2015, 11/12/2017     Pneumo Conj 13-V (2010&after) 11/16/2015     Pneumococcal 23 valent 08/27/2014     TD (ADULT, 7+) 10/25/2005     TDAP Vaccine (Adacel) 10/19/2012     TDAP Vaccine (Boostrix) 10/19/2012     Td (Adult), Adsorbed 10/25/2005   Due for PCV - Discuss at future medicare wellness         Review of Systems:     3-point ROS reviewed and negative unless otherwise noted in HPI         Objective:     Vitals:    01/18/21 1344   BP: 114/73   Pulse: 63   Resp: 18   SpO2: 97%   Weight: 53 kg (116 lb 12.8 oz)   Height: 1.626 m (5' 4\")     Body mass index is 20.05 kg/m .    Exam: Detailed exam of right ankle involving the musculoskeletal, neurological, integumentary, and cardiovascular systems were performed - pertinent findings include: mild non pitting edema around the ankle; tenderness to palpation of distal tibia and fibula    External Data: ER visit from 12/24/20    Internal Data: none    "

## 2021-01-19 RX ORDER — INFLUENZA A VIRUS A/MICHIGAN/45/2015 X-275 (H1N1) ANTIGEN (FORMALDEHYDE INACTIVATED), INFLUENZA A VIRUS A/SINGAPORE/INFIMH-16-0019/2016 IVR-186 (H3N2) ANTIGEN (FORMALDEHYDE INACTIVATED), INFLUENZA B VIRUS B/PHUKET/3073/2013 ANTIGEN (FORMALDEHYDE INACTIVATED), AND INFLUENZA B VIRUS B/MARYLAND/15/2016 BX-69A ANTIGEN (FORMALDEHYDE INACTIVATED) 60; 60; 60; 60 UG/.7ML; UG/.7ML; UG/.7ML; UG/.7ML
INJECTION, SUSPENSION INTRAMUSCULAR
COMMUNITY
Start: 2020-09-26 | End: 2021-04-09

## 2021-01-20 NOTE — PROGRESS NOTES
Preceptor Attestation:  Patient's case reviewed and discussed with the resident, Kodak Pryor MD, and I personally evaluated the patient. I agree with written assessment and plan of care.    Supervising Physician:  Susan Quintanilla MD   [facility]

## 2021-01-27 DIAGNOSIS — J43.9 PULMONARY EMPHYSEMA, UNSPECIFIED EMPHYSEMA TYPE (H): ICD-10-CM

## 2021-01-28 ENCOUNTER — TELEPHONE (OUTPATIENT)
Dept: FAMILY MEDICINE | Facility: CLINIC | Age: 66
End: 2021-01-28

## 2021-01-28 NOTE — TELEPHONE ENCOUNTER
Received another PA request for Incruse Ellipta stating insurance covers spiriva only. Called to verify with [facility] pharmacy what the issue was regarding pt's inhaler as pt was prescribed Spiriva and was told insurance did not cover it.     Spoke to Navneet at [facility] pharmacy, Navneet confirmed that patient picked up the Spiriva already and unsure why patient said it wasn't cover.     Called and spoke to patient and pt's wife Myesha, to confirmed status of the spiriva. Patient stated he have the Spiriva but have not use it because he don't know how to and was not given instructions by the pharmacy. Pt's wife, Myesha, stated they had to pay $20 for the Spiriva and it was not free like what she was told. I told patient and Myesha that insurance may cover the inhaler/medication but what she owes may be due to copays or deductible depending on pt's insurance plan. I provided pt's insurance phone number to patient and his wife for further insurance questions. Also told patient he can take the Spiriva back to [facility] and have them instruct him on how to use it properly, or he can bring it to his next clinic appt on 2/16 also; but it would be more appropriate if he takes it to [facility].     Patient and his wife stated understanding.

## 2021-01-29 DIAGNOSIS — S82.831D CLOSED FRACTURE OF DISTAL END OF RIGHT FIBULA WITH ROUTINE HEALING, UNSPECIFIED FRACTURE MORPHOLOGY, SUBSEQUENT ENCOUNTER: ICD-10-CM

## 2021-02-01 ENCOUNTER — TRANSFERRED RECORDS (OUTPATIENT)
Dept: HEALTH INFORMATION MANAGEMENT | Facility: CLINIC | Age: 66
End: 2021-02-01

## 2021-02-01 RX ORDER — ASPIRIN 325 MG
325 TABLET ORAL EVERY 6 HOURS PRN
Qty: 90 TABLET | Refills: 3 | Status: SHIPPED | OUTPATIENT
Start: 2021-02-01 | End: 2021-02-03

## 2021-02-03 DIAGNOSIS — S82.831D CLOSED FRACTURE OF DISTAL END OF RIGHT FIBULA WITH ROUTINE HEALING, UNSPECIFIED FRACTURE MORPHOLOGY, SUBSEQUENT ENCOUNTER: ICD-10-CM

## 2021-02-03 RX ORDER — ASPIRIN 325 MG
325 TABLET ORAL EVERY 6 HOURS PRN
Qty: 90 TABLET | Refills: 1 | Status: SHIPPED | OUTPATIENT
Start: 2021-02-03 | End: 2021-02-16 | Stop reason: ALTCHOICE

## 2021-02-03 NOTE — TELEPHONE ENCOUNTER
Message to physician: Aspirin    Date of last visit: 1/18/2021     Date of next visit if scheduled: Visit date not found       Last Comprehensive Metabolic Panel:  Sodium   Date Value Ref Range Status   10/23/2020 140.0 133.0 - 144.0 mmol/L Final     Potassium   Date Value Ref Range Status   10/23/2020 4.5 3.4 - 5.3 mmol/L Final     Chloride   Date Value Ref Range Status   10/23/2020 103.0 94.0 - 109.0 mmol/L Final     Carbon Dioxide   Date Value Ref Range Status   10/23/2020 27.0 20.0 - 32.0 mmol/L Final     Glucose   Date Value Ref Range Status   10/23/2020 117.0 (H) 60.0 - 109.0 mg/dL Final     Urea Nitrogen   Date Value Ref Range Status   10/23/2020 13.0 7.0 - 30.0 mg/dL Final     Creatinine   Date Value Ref Range Status   10/23/2020 0.8 0.8 - 1.5 mg/dL Final     Calcium   Date Value Ref Range Status   10/23/2020 9.9 8.5 - 10.4 mg/dL Final       BP Readings from Last 3 Encounters:   01/18/21 114/73   01/05/21 120/72   10/23/20 119/78       No results found for: A1C             Please complete refill and CLOSE ENCOUNTER.  Closing the encounter signifies the refill is complete.

## 2021-02-16 ENCOUNTER — OFFICE VISIT (OUTPATIENT)
Dept: FAMILY MEDICINE | Facility: CLINIC | Age: 66
End: 2021-02-16
Payer: MEDICARE

## 2021-02-16 VITALS
HEART RATE: 74 BPM | DIASTOLIC BLOOD PRESSURE: 80 MMHG | HEIGHT: 64 IN | WEIGHT: 118 LBS | TEMPERATURE: 97.9 F | OXYGEN SATURATION: 98 % | SYSTOLIC BLOOD PRESSURE: 112 MMHG | BODY MASS INDEX: 20.14 KG/M2 | RESPIRATION RATE: 20 BRPM

## 2021-02-16 DIAGNOSIS — S82.831D CLOSED FRACTURE OF DISTAL END OF RIGHT FIBULA WITH ROUTINE HEALING, UNSPECIFIED FRACTURE MORPHOLOGY, SUBSEQUENT ENCOUNTER: Primary | ICD-10-CM

## 2021-02-16 DIAGNOSIS — Z72.0 TOBACCO USE: ICD-10-CM

## 2021-02-16 DIAGNOSIS — J43.9 PULMONARY EMPHYSEMA, UNSPECIFIED EMPHYSEMA TYPE (H): ICD-10-CM

## 2021-02-16 PROCEDURE — 99214 OFFICE O/P EST MOD 30 MIN: CPT | Mod: 24 | Performed by: STUDENT IN AN ORGANIZED HEALTH CARE EDUCATION/TRAINING PROGRAM

## 2021-02-16 ASSESSMENT — MIFFLIN-ST. JEOR: SCORE: 1231.24

## 2021-02-16 NOTE — PATIENT INSTRUCTIONS
I would recommend going back to taking aspirin 81 mg daily (rather than the larger dose of aspirin) and Tylenol as needed for pain.  We can help schedule PT for you (the referral has been sent) or you can have PT through [facility] if that is easier for you.

## 2021-02-16 NOTE — LETTER
RETURN TO WORK/SCHOOL FORM    2/16/2021    Re: Zafar Spencer  1955      To Whom It May Concern:     Zafar Spencer was seen in clinic today.  He may return to work with restrictions on 2/17/21          Restrictions: Cannot stand for more than 1 hour at a time.  Should be allowed frequent short breaks.      Piper Barney MD  2/16/2021 12:16 PM

## 2021-02-16 NOTE — PROGRESS NOTES
"       HPI       Zafar Spencer is a 65 year old male with PMH significant for tobacco abuse and emphysema who presents for:    Chief Complaint   Patient presents with     Musculoskeletal Problem     f/u right ankle pain, still having shooting pain. want more time off work     Cough     f/u bronchitis     Medication Reconciliation     complete       Ankle injury  [facility] 2/1/21 note reviewed: stable Grijalva B right distal fibula fracture, closed.  Plan to wean out of cam boot, PT, consider bone stimulator.  Plan for Ortho follow-up in 6 weeks with repeat XR.  Last seen here for this 1/18/21; paperwork for unemployment signed by PCP at that visit.  Injury date: 12/24/20  Mechanical fall getting on bus to go to work  Works as  at [employer]  Today reports stopped wearing cam boot 3-4 days ago  Pain is \"moderate\"  Taking  mg for pain in past couple weeks, usually once per day although not every day  Also taking ASA 81 mg daily  Requests work note    Continues smoking 1/2 ppd, reduced from 1 ppd  Previously prescribed Incruse and liked this inhaler but was told couldn't get anymore  Also previously prescribed Spiriva, doesn't like as much, says can't feel the powder in mouth    Social history: Lives with his wife of >30 years who accompanies him to visit.      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       Current Outpatient Medications   Medication Sig Dispense Refill     aspirin (ASA) 81 MG EC tablet Take 1 tablet (81 mg) by mouth daily 90 tablet 3     atorvastatin (LIPITOR) 40 MG tablet Take 1 tablet (40 mg) by mouth daily 30 tablet 0     FLUZONE HIGH-DOSE QUADRIVALENT 0.7 ML BOYD injection PHARMACY ADMINISTERED       tiotropium " "(SPIRIVA) 18 MCG inhaled capsule Inhale 1 capsule (18 mcg) into the lungs daily 90 capsule 3     benzonatate (TESSALON) 100 MG capsule        umeclidinium (INCRUSE ELLIPTA) 62.5 MCG/INH inhaler Inhale 1 puff into the lungs daily (Patient not taking: Reported on 2/16/2021) 30 each 11          Allergies   Allergen Reactions     Latex Rash     Penicillins Rash     rash            Review of Systems:   Complete 10-point ROS negative except as per HPI           Physical Exam:     Vitals:    02/16/21 1128   BP: 112/80   BP Location: Right arm   Pulse: 74   Resp: 20   Temp: 97.9  F (36.6  C)   TempSrc: Oral   SpO2: 98%   Weight: 53.5 kg (118 lb)   Height: 1.626 m (5' 4\")     Body mass index is 20.25 kg/m .    GENERAL: healthy, alert and no distress  RESP: poor air movement throughout, lungs clear to auscultation - no rales, no rhonchi, no wheezes  CV: regular rates and rhythm, normal S1 S2, no S3 or S4 and no murmur, no click or rub  MS: extremities- no gross deformities noted, no edema; right distal fibula fracture callous palpable with appropriate overlying tenderness, full dorsiflexion, plantar flexion, inversion, eversion minimally limited by pain    Office Visit on 10/23/2020   Component Date Value Ref Range Status     Specific Gravity Urine 10/23/2020 >=1.030  1.005 - 1.030 Final     pH Urine 10/23/2020 5.0  4.5 - 8.0 Final     Leukocyte Esterase UR 10/23/2020 Negative  NEGATIVE Final     Nitrite Urine 10/23/2020 Negative  NEGATIVE mg/dL Final     Protein UR 10/23/2020 Negative  NEGATIVE mg/dL Final     Glucose Urine 10/23/2020 Negative  NEGATIVE Final     Ketones Urine 10/23/2020 Negative  NEGATIVE mg/dL Final     Urobilinogen mg/dL 10/23/2020 0.2 E.U./dL  0.2 E.U./dL E.U./dL Final     Bilirubin UR 10/23/2020 Negative  NEGATIVE mg/dL Final     Blood UR 10/23/2020 Trace-intact* NEGATIVE mg/dL Final     Color Urine 10/23/2020 Yellow   Final     Clarity, urine 10/23/2020 Clear   Final     Glucose 10/23/2020 117.0* 60.0 " - 109.0 mg/dL Final     Urea Nitrogen 10/23/2020 13.0  7.0 - 30.0 mg/dL Final     Creatinine 10/23/2020 0.8  0.8 - 1.5 mg/dL Final     Sodium 10/23/2020 140.0  133.0 - 144.0 mmol/L Final     Potassium 10/23/2020 4.5  3.4 - 5.3 mmol/L Final     Chloride 10/23/2020 103.0  94.0 - 109.0 mmol/L Final     Carbon Dioxide 10/23/2020 27.0  20.0 - 32.0 mmol/L Final     Calcium 10/23/2020 9.9  8.5 - 10.4 mg/dL Final     Protein Total 10/23/2020 7.3  6.6 - 8.8 g/dL Final     Albumin 10/23/2020 4.0  3.2 - 4.5 g/dL Final     Alkaline Phosphatase 10/23/2020 54.0  40.0 - 150.0 U/L Final     ALT 10/23/2020 17.0  0.0 - 45.0 U/L Final     AST 10/23/2020 31.0  0.0 - 55.0 U/L Final     Bilirubin Total 10/23/2020 0.8  0.2 - 1.3 mg/dL Final     eGFR Calculated (Non Black Referen* 10/23/2020 >90  >60.0 mL/min Final     eGFR Calculated (Black Reference) 10/23/2020 >90  >60.0 mL/min Final     WBC 10/23/2020 8.2  4.0 - 11.0 K/uL Final     Lymphocytes # 10/23/2020 1.7  0.8 - 5.3 K/uL Final     % Lymphocytes 10/23/2020 20.2  20.0 - 48.0 %L Final     Mid # 10/23/2020 0.5  0.0 - 2.2 K/uL Final     Mid % 10/23/2020 5.6  0.0 - 20.0 %M Final     GRANULOCYTES # 10/23/2020 6.1  1.6 - 8.3 K/uL Final     % Granulocytes 10/23/2020 74.2  40.0 - 75.0 %G Final     RBC 10/23/2020 4.44  4.40 - 5.90 M/uL Final     Hemoglobin 10/23/2020 13.9  13.3 - 17.7 g/dL Final     Hematocrit 10/23/2020 44.0  40.0 - 53.0 % Final     MCV 10/23/2020 99.2  78.0 - 100.0 fL Final     MCH 10/23/2020 31.3  26.5 - 35.0 pg Final     MCHC 10/23/2020 31.6* 32.0 - 36.0 g/dL Final     Platelets 10/23/2020 312.0  150.0 - 450.0 K/uL Final     TSH 10/23/2020 1.27  0.30 - 5.00 uIU/mL Final     PSA 10/23/2020 1.1  0.0 - 4.5 ng/mL Final       No results found for this or any previous visit (from the past 24 hour(s)).    Assessment and Plan       Zafar was seen today for musculoskeletal problem, cough and medication reconciliation.    Diagnoses and all orders for this visit:    Closed  fracture of distal end of right fibula with routine healing, unspecified fracture morphology, subsequent encounter  Healing well.  Stable based on XR performed at Ortho follow-up 2 weeks ago.  Now transitioned out of cam boot.  Start PT.  Advised against taking  mg tablets for pain; instead advised Tylenol as needed for pain and to resume previous ASA dosing 81 mg daily.  Follow-up with Ortho with repeat XR as planned in 1 month.  Work note provided, in chart.  -     PHYSICAL THERAPY REFERRAL; Future    Tobacco use  Pulmonary emphysema, unspecified emphysema type (H)  Patient prefers Incruse which was previously covered by insurance, no longer able to obtain this inhaler due to what sounds like an insurance issue.  On Medicare, reports does not have Part D coverage.  Has Spiriva at home but it sounds like he may benefit from technique instruction to optimize use if this is the covered LAMA under his insurance.  Refer to PharmD to verify preferred LAMA based on his insurance coverage, for inhaler teaching and for continued tobacco cessation education.  -     MED THERAPY MANAGE REFERRAL    Options for treatment and follow-up care were reviewed with the patient and/or guardian. Zafar Spencer and/or guardian engaged in the decision making process and verbalized understanding of the options discussed and agreed with the final plan.  Precepted with Dr. Alex Barney MD  [facility] Medicine Resident  Pager [phone]

## 2021-02-17 ENCOUNTER — AMBULATORY - HEALTHEAST (OUTPATIENT)
Dept: ADMINISTRATIVE | Facility: REHABILITATION | Age: 66
End: 2021-02-17

## 2021-02-17 DIAGNOSIS — S82.831D CLOSED FRACTURE OF DISTAL END OF RIGHT FIBULA WITH ROUTINE HEALING, UNSPECIFIED FRACTURE MORPHOLOGY, SUBSEQUENT ENCOUNTER: ICD-10-CM

## 2021-02-18 NOTE — PROGRESS NOTES
Preceptor Attestation:   Patient seen, evaluated and discussed with the resident. I have verified the content of the note, which accurately reflects my assessment of the patient and the plan of care.    Supervising Physician:Alex oRche MD    [facility]

## 2021-03-08 ENCOUNTER — TRANSFERRED RECORDS (OUTPATIENT)
Dept: HEALTH INFORMATION MANAGEMENT | Facility: CLINIC | Age: 66
End: 2021-03-08

## 2021-03-12 ENCOUNTER — TELEPHONE (OUTPATIENT)
Dept: FAMILY MEDICINE | Facility: CLINIC | Age: 66
End: 2021-03-12

## 2021-03-12 NOTE — TELEPHONE ENCOUNTER
Spoke to Patient and spouse, spouse states they will think about getting the covid vaccine, did let them know it is only on 3/20 for scheduling.

## 2021-03-30 ENCOUNTER — RECORDS - HEALTHEAST (OUTPATIENT)
Dept: ADMINISTRATIVE | Facility: OTHER | Age: 66
End: 2021-03-30

## 2021-03-30 ENCOUNTER — OFFICE VISIT (OUTPATIENT)
Dept: FAMILY MEDICINE | Facility: CLINIC | Age: 66
End: 2021-03-30
Payer: MEDICARE

## 2021-03-30 VITALS
RESPIRATION RATE: 16 BRPM | DIASTOLIC BLOOD PRESSURE: 74 MMHG | OXYGEN SATURATION: 97 % | TEMPERATURE: 97.7 F | SYSTOLIC BLOOD PRESSURE: 107 MMHG | HEART RATE: 72 BPM

## 2021-03-30 DIAGNOSIS — S82.831D CLOSED FRACTURE OF DISTAL END OF RIGHT FIBULA WITH ROUTINE HEALING, UNSPECIFIED FRACTURE MORPHOLOGY, SUBSEQUENT ENCOUNTER: Primary | ICD-10-CM

## 2021-03-30 PROCEDURE — 99214 OFFICE O/P EST MOD 30 MIN: CPT | Mod: GC | Performed by: STUDENT IN AN ORGANIZED HEALTH CARE EDUCATION/TRAINING PROGRAM

## 2021-03-30 NOTE — PROGRESS NOTES
Assessment and Plan     (G35.747S) Closed fracture of distal end of right fibula with routine healing, unspecified fracture morphology, subsequent encounter  (primary encounter diagnosis)  Comment: Likely completely healed or almost completely.  Will order XR to confirm complete healing.  Did well with PT.  Will see back in clinic and make sure okay to go back at that point.  Plan:   -XR Tibia & Fibula Right 2 Views  -work note provided        -see on 4/9/21        Options for treatment and follow-up care were reviewed with the patient and/or guardian. Zafar Spencer and/or guardian engaged in the decision making process and verbalized understanding of the options discussed and agreed with the final plan.      Kodak Pryor MD    Precepted today with: Edita Jung MD           HPI:       Zafar Spencer is a 65 year old  male without a significant past medical history accompanied with wife who presents for follow up of concern(s) listed below    1) letter for proof  -Distal fibula fracture in Dec 2020  -Followed with clinic and [facility]  -Progressed per expectation using CAM boot  -Has graduated from CAM boot in late Feb 2021  -PT exercises engaged with, and now graduated from sessions  -Boss wants him to come back and is mad about him being out  -walking around home alright, and helping out  -Wife is concerned about him going back  -no consistent pain with ambulation, but some discomfort with pressure to area         Review of Systems:     3-point ROS reviewed and negative unless otherwise noted in HPI         Objective:     Vitals:    03/30/21 1020   BP: 107/74   BP Location: Right arm   Pulse: 72   Resp: 16   Temp: 97.7  F (36.5  C)   TempSrc: Oral   SpO2: 97%     There is no height or weight on file to calculate BMI.    Exam: Brief exam of consitutional, ocular, HENT, pulmonary, cardiovascular, musculoskeletal, integumentary, neurological, and psychiatric systems were performed - pertinent findings  include: mild tenderness around fibula bone callus     External Data: [facility] note    Internal Data: ordered XR

## 2021-03-30 NOTE — PROGRESS NOTES
Preceptor Attestation:  Patient seen, examined, and discussed with the resident..  I agree with written assessment and plan of care.  Supervising Physician:  Cheryl Jung MD  [facility]

## 2021-03-30 NOTE — LETTER
RETURN TO WORK/SCHOOL FORM    3/30/2021    Re: Zafar Spencer  1955      To Whom It May Concern:     Zafar Spencer was seen in clinic today.  Patient will be seen on 4/9/21 for re-evaluation.  Patient has distal fibula fracture on the right.  Patient has gone through PT.  Will confirm with [facility] that patient is okay from their standpoint to return and repeat Xray today.  They will be seen on 4/9/21 to make final decision for return to work.       Kodak Pryor MD  3/30/2021 10:49 AM

## 2021-03-31 RX ORDER — ASPIRIN 325 MG
TABLET ORAL
COMMUNITY
Start: 2021-02-01 | End: 2021-04-09

## 2021-04-09 ENCOUNTER — TELEPHONE (OUTPATIENT)
Dept: FAMILY MEDICINE | Facility: CLINIC | Age: 66
End: 2021-04-09

## 2021-04-09 ENCOUNTER — OFFICE VISIT (OUTPATIENT)
Dept: PHARMACY | Facility: CLINIC | Age: 66
End: 2021-04-09

## 2021-04-09 DIAGNOSIS — J43.9 PULMONARY EMPHYSEMA, UNSPECIFIED EMPHYSEMA TYPE (H): Primary | ICD-10-CM

## 2021-04-09 DIAGNOSIS — E78.5 HYPERLIPIDEMIA, UNSPECIFIED HYPERLIPIDEMIA TYPE: ICD-10-CM

## 2021-04-09 DIAGNOSIS — S82.831D CLOSED FRACTURE OF DISTAL END OF RIGHT FIBULA WITH ROUTINE HEALING, UNSPECIFIED FRACTURE MORPHOLOGY, SUBSEQUENT ENCOUNTER: Primary | ICD-10-CM

## 2021-04-09 PROCEDURE — 99607 MTMS BY PHARM ADDL 15 MIN: CPT | Performed by: PHARMACIST

## 2021-04-09 PROCEDURE — 99605 MTMS BY PHARM NP 15 MIN: CPT | Performed by: PHARMACIST

## 2021-04-09 RX ORDER — IPRATROPIUM BROMIDE AND ALBUTEROL SULFATE 2.5; .5 MG/3ML; MG/3ML
1 SOLUTION RESPIRATORY (INHALATION) 4 TIMES DAILY
Qty: 360 ML | Refills: 1 | Status: SHIPPED | OUTPATIENT
Start: 2021-04-09 | End: 2021-04-15

## 2021-04-09 NOTE — PROGRESS NOTES
Medication Therapy Management (MTM) Encounter    ASSESSMENT:                            Emphysemia: Uncontrolled, Patient cannot afford his inhaler due to cost. Anticipate patient has no Part D insurance and therefore need to seek coverage for nebulizer medicine through Medicare Part B. Long term goal would be to help patient obtain Part D coverage?  Tobacco cessation: Uncontrolled-unable to address today. Patient is taking small steps to quit smoking.   Hyperlipidemia/Primary prevention of cardiovascular disease: Back pain is unlikely be a side effect of atorvastatin.  Statin myopathy typically occurs in large, bilateral, or proximal muscle groups. Patient prescribed moderate intensity statin which is appropriate given estimated 10 year risk. Also aspirin could be appropriate for patient given 10 year risk >10%, however risk /benefit margin is very slim. Could consider deprescribing in future, however warrants further discussion with patient as unclear if has any family history of premature cardiovascular disease    PLAN:                            1. Restart the cholesterol pill each evening  2.  the nebulizer machine and medicine from [facility]  3. Bring your insurance cards to [facility] as they will likely need these to process the order      Follow-up: 2 weeks    SUBJECTIVE/OBJECTIVE:                          Zafar Spencer is a 65 year old male coming in for an initial visit. He was referred to me from Dr. Barney. Patient was accompanied by wife, Myesha, who provides additional history.      Reason for visit: Tobacca cessation and inhaler coverage .    Allergies/ADRs: Reviewed in chart  Social History     Tobacco Use     Smoking status: Current Every Day Smoker     Packs/day: 0.25     Years: 52.00     Pack years: 13.00     Types: Cigarettes     Smokeless tobacco: Never Used     Tobacco comment: 1/2 pack/day   Substance Use Topics     Alcohol use: No     Drug use: Yes     Frequency: 2.0 times per week      Types: Marijuana     ^Reviewed today  Past Medical History: Reviewed in chart      Emphysemia: Patient is not on ny inhaler. Patient was previously on Sprivia Handihaler, but does not like the powder in his month and throat. Incruse Ellipta is too expensive (cost~$400) for him. Called [facility] to check if some of the cost is going towards deductible. [facility] does not have any insurance on file.   Tobacco cessation:  Patient has cut back on smoking cigarettes , replaced it with E-cigarettes. He is still smoking marijuana, however has cut back on this as well.   Hyperlipidemia/Primary prevention of cardiovascular disease: Patient stopped taking his atorvastatin about couple months ago due to bank pain. The back pain subsided after stopping atorvastatin. Patient is still taking aspirin 81 mg daily, and denies any side effects. Patient seemed agreeable to trying atorvastatin again.     The 10-year ASCVD risk score (Kaylee HOUSTON Jr., et al., 2013) is: 13.3%    Values used to calculate the score:      Age: 65 years      Sex: Male      Is Non- : No      Diabetic: No      Tobacco smoker: Yes      Systolic Blood Pressure: 107 mmHg      Is BP treated: No      HDL Cholesterol: 48 mg/dL      Total Cholesterol: 199 mg/dL    ----------------      I spent 30 minutes with this patient today. Dr. Pryor was provided the recommendations above  via routed note and Dr. Quintanilla is the authorizing prescriber for this visit through the pharmacist collaborative practice agreement.. A copy of the visit note was provided to the patient's primary care provider.    The patient was given a summary of these recommendations.     Elisabet Schneider IV pharmacy student    Preceptor Attestation:  I was present with the pharmacy student who participated in the service and in the documentation of this note. I have verified the history, personally performed the medical decision making, and have verified the content of the note, which accurately  reflects my assessment of the patient and the plan of care.     Brenda Martines, PharmD, BCACP, CDE  [facility]  Phone: [phone]  April 9, 2021 at 3:59 PM    For insurance/tracking purposes, MTM Team Documentation:    Medication Therapy Recommendations  No medication therapy recommendations to display

## 2021-04-09 NOTE — PROGRESS NOTES
Medication Therapy Management (MTM) Encounter    ASSESSMENT:                            Medication Adherence/Access: {adherencechoices:301087}    Emphysemia: Patient cannot afford his inhaler due to cost.   Tobacco cessation: Patient is taking small steps to quit smoking.   Hyperlipidemia/Primary prevention of cardiovascular disease: Back pain is unlikely be a side effect of atorvastatin.  Statin myopathy typically occurs in large, bilateral, or proximal muscle groups. Patient seemed agreeable to trying atorvastatin again.     PLAN:                            1. Restart the cholesterol pill each evening  2.  the nebulizer machine and medicine from [facility]  3. Bring your insurance cards to [facility] as they will likely need these to process the order      Follow-up: ***    SUBJECTIVE/OBJECTIVE:                          Zafar Spencer is a 65 year old male coming in for an initial visit. He was referred to me from Dr. Barney. Patient was accompanied by wife.     Reason for visit: Tobacca cessation and inhaler coverage .    Allergies/ADRs: Reviewed in chart  Tobacco: He reports that he has been smoking cigarettes. He has a 13.00 pack-year smoking history. He has never used smokeless tobacco.Tobacco Cessation Action Plan:   {TOBACCO CESSATION ACTION PLAN:408683}    Alcohol: {ALCOHOL CONSUMPTION HX:339721}  Caffeine: {CAFFEINE INTAKE:072748}  Activity: ***  Past Medical History: {1/2/3/4/5:486224}  {Social and Goals:560550}    Medication Adherence/Access: no issues reported      Emphysemia: Patient is not on ny inhaler. Patient was previously on Sprivia Handihaler, but does not like the powder in his month and throat. Incruse Ellipta is too expensive (cost~$400) for him. Called [facility] to check if some of the cost is going towards deductible. [facility] does not have any insurance on file.   Tobacco cessation:  Patient has cut back on smoking cigarettes , replaced it with E-cigarettes. He is still smoking  "marijuana.  Hyperlipidemia/Primary prevention of cardiovascular disease: Patient stopped taking his atorvastatin about couple months ago due to bank pain. The back pain subsided after stopping atorvastatin. Patient is still taking aspirin 81 mg daily, and denies any side effects.       Today's Vitals: There were no vitals taken for this visit.  ----------------  {YOLIS?:490483}    I spent {mt total time 3:414793} with this patient today{MTMpartdbillingquestion:705903}. { :485040}. A copy of the visit note was provided to the patient's {ccd chart:745718} provider.    The patient {GIVEN/NOT GIVEN:805599::\"was given\"} a summary of these recommendations. {covisit:618948}    Elisaebt Schneider, IV pharmacy student      {Click at end of visit to add-in MTP:870406}    "

## 2021-04-09 NOTE — PROGRESS NOTES
"Medication Therapy Management (MTM) Encounter    ASSESSMENT:                            Medication Adherence/Access: {adherencechoices:798090}    ***: ***  ***: ***  ***: ***  ***: ***    PLAN:                            ***    Follow-up: ***    SUBJECTIVE/OBJECTIVE:                          Zafar Spencer is a 65 year old male coming in for an initial visit. He was referred to me from ***. {mtmvisitdetails:788117}     Reason for visit: ***.    Allergies/ADRs: {1/2/3/4/5:725644}  Tobacco: He reports that he has been smoking cigarettes. He has a 13.00 pack-year smoking history. He has never used smokeless tobacco.Tobacco Cessation Action Plan:   {TOBACCO CESSATION ACTION PLAN:750683}    Alcohol: {ALCOHOL CONSUMPTION HX:190352}  Caffeine: {CAFFEINE INTAKE:825564}  Activity: ***  Past Medical History: {1/2/3/4/5:660441}  {Social and Goals:780192}    Medication Adherence/Access: {fumedadherence:962015}    ***: ***  ***: ***  ***: ***  ***: ***  ***: ***    Today's Vitals: There were no vitals taken for this visit.  ----------------  {YOLIS?:869674}    I spent {mt total time 3:576343} with this patient today{MTMpartdbillingquestion:982853}. { :171058}. A copy of the visit note was provided to the patient's {[facility] chart:791060} provider.    The patient {GIVEN/NOT GIVEN:605498::\"was given\"} a summary of these recommendations. {covisit:051420}    ***    Telemedicine Visit Details  Type of service:  {telemedvisitmtm:048487::\"Telephone visit\"}  Start Time: {video/phone visit start time:152948}  End Time: {video/phone visit end time:152948}  Originating Location (patient location): Home  Distant Location (provider location):  [facility] MTM    {Click at end of visit to add-in MTP:587316}    "

## 2021-04-09 NOTE — TELEPHONE ENCOUNTER
[facility] phone call message- general phone call:    Reason for call: Called lvm to go to [facility] to get xray done. No need to sched through us. They can just walk in to get it done there and he can come back on his sched appt with [facility] to f/u on xray. Can be transferred to me if needed.     Return call needed: Yes    OK to leave a message on voice mail? Yes    Primary language: English      needed? No    Call taken on April 9, 2021 at 11:21 AM by Jelly Kumar

## 2021-04-09 NOTE — PATIENT INSTRUCTIONS
Recommendations from today's visit:                                                      1. Restart the cholesterol pill each evening  2.  the nebulizer machine and medicine from [facility]  3. Bring your insurance cards to [facility] as they will likely need these to process the order      It was great to speak with you today!    I value your experience. You may receive a survey via email or text message in the next few days. I would be very thankful for your time in providing feedback.    Pharmacist's contact information:                                                      Please feel free to contact me with any questions or concerns you have.     Brenda Mcfadden  Pharmacist, Certified Diabetes Care and   [facility]  Phone: [phone]  April 9, 2021 at 9:38 AM

## 2021-04-09 NOTE — Clinical Note
Frederic for sending chart twice. [facility] and then seeing you ASAP afterwards/next week to review result

## 2021-04-14 ENCOUNTER — TELEPHONE (OUTPATIENT)
Dept: FAMILY MEDICINE | Facility: CLINIC | Age: 66
End: 2021-04-14

## 2021-04-14 DIAGNOSIS — J43.9 PULMONARY EMPHYSEMA, UNSPECIFIED EMPHYSEMA TYPE (H): ICD-10-CM

## 2021-04-14 NOTE — TELEPHONE ENCOUNTER
[facility] phone call message- medication clarification/question:    Full Medication Name: ipratropium - albuterol 0.5 mg/2.5 mg/3 mL (DUONEB) 0.5-2.5 (3) MG/3ML neb solution and aspirin (ASA) 81 MG EC tablet       Have you contacted your pharmacy about this refill request? Yes    If  Yes,  which pharmacy?    When did you contact the pharmacy?    Additional comments/concerns from call to pharmacy:    Reason for call to clinic: Patient states [facility] is charging him for the medication. He states insurance usually covers the medication and wants his prescription sent to [facility] in [facility]. Patient states he doesn't have money to pay for medication. Please call and advise.       Pharmacy confirmed as    [facility] [address] [place] [facility] [address]  : Yes    OK to leave a message on voice mail? Yes    Primary language: English      needed? No    Call taken on April 14, 2021 at 2:12 PM by Elisa Lenz

## 2021-04-15 ENCOUNTER — HOSPITAL ENCOUNTER (OUTPATIENT)
Dept: RADIOLOGY | Facility: HOSPITAL | Age: 66
Discharge: HOME | End: 2021-04-15

## 2021-04-15 ENCOUNTER — OFFICE VISIT (OUTPATIENT)
Dept: FAMILY MEDICINE | Facility: CLINIC | Age: 66
End: 2021-04-15
Payer: MEDICARE

## 2021-04-15 VITALS
OXYGEN SATURATION: 97 % | DIASTOLIC BLOOD PRESSURE: 69 MMHG | WEIGHT: 119 LBS | RESPIRATION RATE: 16 BRPM | TEMPERATURE: 97.7 F | HEART RATE: 77 BPM | HEIGHT: 63 IN | SYSTOLIC BLOOD PRESSURE: 102 MMHG | BODY MASS INDEX: 21.09 KG/M2

## 2021-04-15 DIAGNOSIS — S82.831D CLOSED FRACTURE OF DISTAL END OF RIGHT FIBULA WITH ROUTINE HEALING, UNSPECIFIED FRACTURE MORPHOLOGY, SUBSEQUENT ENCOUNTER: ICD-10-CM

## 2021-04-15 DIAGNOSIS — Z71.6 ENCOUNTER FOR SMOKING CESSATION COUNSELING: Primary | ICD-10-CM

## 2021-04-15 PROCEDURE — 99213 OFFICE O/P EST LOW 20 MIN: CPT | Mod: GC | Performed by: STUDENT IN AN ORGANIZED HEALTH CARE EDUCATION/TRAINING PROGRAM

## 2021-04-15 ASSESSMENT — MIFFLIN-ST. JEOR: SCORE: 1213.52

## 2021-04-15 NOTE — PROGRESS NOTES
Assessment and Plan     (Z71.6) Encounter for smoking cessation counseling  (primary encounter diagnosis)  Comment: Ready to attempt.  Will start with Chantix.  Congratulated.  Plan:   -varenicline (CHANTIX AMIRA) 0.5 MG X 11 & 1 MG X42 tablet      (S82.831D) Closed fracture of distal end of right fibula with routine healing, unspecified fracture morphology, subsequent encounter  Comment: Incomplete healing of fracture; however, patient is able to ambulate and work per usual even with this type of injury.  Discussed in detail with patient and wife about this, but patient's wife insistent that he needs to continue to stay on unemployment.  I did provide them with a letter stating what the XR said, but not that patient couldn't go back to work.    Plan:   -Letter provided  -patient can go back to work  -encouraged to ambulate to help heal  -calcium carbonate-vitamin D (OS-TAM) 600-400         MG-UNIT chewable tablet        Options for treatment and follow-up care were reviewed with the patient and/or guardian. Zafar Spencer and/or guardian engaged in the decision making process and verbalized understanding of the options discussed and agreed with the final plan.      Kodak Pryor MD    Precepted today with: Manjit Umanzor MD           HPI:       Zafar Spencer is a 65 year old  male without a significant past medical history accompanied with wife who presents for follow up of concern(s) listed below    1) status post fibula fracture  -see below for details  -Wife is concerned about patient returning to work -> she feels he is doing much better at home  -Patient is okay with going back when asked  -no pain with ambulation or easy fatigue  -He has graduated from PT    2) smoking cessation  -interested in stopping    Independent Historian (wife): see above         Summary of Events:     #Closed kuhn B fracture of right distal fibula  -Injury on 12/24/20  -Seen on 1/5/21 -> continue CAM boot, repeat XR in 4  "weeks  -Seen on 1/18/21 -> needed unemployment paperwork filled out  -[facility] 2/1/21 -> XR showed healing well -> wean off CAM boot, PT, repeat XR in 6 weeks  -Seen on 2/16/21 -> out of CAM boot, start PT  -Seen on 3/30/21 -> graduated from PT -> ordered XR to confirmed healed           Review of Systems:     3-point ROS reviewed and negative unless otherwise noted in HPI           Objective:     Vitals:    04/15/21 1527   BP: 102/69   BP Location: Right arm   Pulse: 77   Resp: 16   Temp: 97.7  F (36.5  C)   TempSrc: Oral   SpO2: 97%   Weight: 54 kg (119 lb)   Height: 1.59 m (5' 2.6\")     Body mass index is 21.35 kg/m .    Exam: Detailed exam of pulmonary and cardiovascular systems; Brief exam of consitutional, ocular, HENT, musculoskeletal, integumentary, neurological, and psychiatric systems were performed - pertinent findings include: Prolonged expiratory phase, no increased WOB, no wheezing or crackles      External Data: none    Internal Data: XR results  "

## 2021-04-15 NOTE — LETTER
Re:Zafar Spencer  1955    To Whom It Concerns:    He is a patient of mine.  He was seen 04/15/21.  He had a oblique right fibula fracture in December 2020.  His XR on 4/15/21 showed delayed healing of his right fibula.  Going forward, patient would benefit from continued weight bearing activity to help heal.      Kodak Pryor MD

## 2021-04-15 NOTE — LETTER
April 15, 2021      Zafar Spencer  [address]  [place] [address]        Dear Zafar,    He is a patient of mine.  He was seen 04/15/21.  He had a oblique right fibula fracture in December 2020.  His XR on 4/15/21 showed delayed healing of his right fibula.  Going forward, patient would benefit from continued weight bearing activity to help heal.    Sincerely,    Kodak Pryor MD

## 2021-04-18 RX ORDER — IPRATROPIUM BROMIDE AND ALBUTEROL SULFATE 2.5; .5 MG/3ML; MG/3ML
1 SOLUTION RESPIRATORY (INHALATION) EVERY 6 HOURS PRN
Qty: 30 ML | Refills: 3 | Status: SHIPPED | OUTPATIENT
Start: 2021-04-18 | End: 2021-05-20

## 2021-04-21 PROBLEM — S82.831D CLOSED FRACTURE OF DISTAL END OF RIGHT FIBULA WITH ROUTINE HEALING, UNSPECIFIED FRACTURE MORPHOLOGY, SUBSEQUENT ENCOUNTER: Status: ACTIVE | Noted: 2021-04-21

## 2021-04-23 ENCOUNTER — OFFICE VISIT (OUTPATIENT)
Dept: PHARMACY | Facility: CLINIC | Age: 66
End: 2021-04-23

## 2021-04-23 VITALS — DIASTOLIC BLOOD PRESSURE: 69 MMHG | HEART RATE: 65 BPM | SYSTOLIC BLOOD PRESSURE: 103 MMHG

## 2021-04-23 DIAGNOSIS — Z72.0 TOBACCO USE: Primary | ICD-10-CM

## 2021-04-23 DIAGNOSIS — E78.5 HYPERLIPIDEMIA, UNSPECIFIED HYPERLIPIDEMIA TYPE: ICD-10-CM

## 2021-04-23 DIAGNOSIS — J43.9 PULMONARY EMPHYSEMA, UNSPECIFIED EMPHYSEMA TYPE (H): ICD-10-CM

## 2021-04-23 PROCEDURE — 99607 MTMS BY PHARM ADDL 15 MIN: CPT | Performed by: PHARMACIST

## 2021-04-23 PROCEDURE — 99606 MTMS BY PHARM EST 15 MIN: CPT | Performed by: PHARMACIST

## 2021-04-23 RX ORDER — NICOTINE 21 MG/24HR
1 PATCH, TRANSDERMAL 24 HOURS TRANSDERMAL EVERY 24 HOURS
Qty: 30 PATCH | Refills: 1 | Status: SHIPPED | OUTPATIENT
Start: 2021-04-23 | End: 2021-07-26

## 2021-04-23 NOTE — PATIENT INSTRUCTIONS
Recommendations from today's visit:                                                      1. Call Medicare Part B and inform them that you no longer have secondary insurance. You need to do this to allow for the nebulizer medicine to be covered at [facility] in [place]  2. Call the Senior Linkage Line and ask for help in enrolling in a Medicare Part D plan. Their phone number is [phone]  3. Call [facility] to ask if you qualify for Medical Assistance [phone]. An alternative plan is to call one of these Assisters or Navigators  4. I sent the prescriptions for the nicotine patch and the lozenge to [facility]. If these are too expensive at [facility], consider buying them OTC from [employer] or [employer]  5. Keep using the Spiriva once per day until you run out then chance to Duonebs four times per day    It was great to speak with you today!    I value your experience. You may receive a survey via email or text message in the next few days. I would be very thankful for your time in providing feedback.    Pharmacist's contact information:                                                      Please feel free to contact me with any questions or concerns you have.     Brenda Mcfadden  Pharmacist, Certified Diabetes Care and   [facility]  Phone: [phone]  April 23, 2021 at 9:43 AM

## 2021-04-23 NOTE — PROGRESS NOTES
Medication Therapy Management (MTM) Encounter    ASSESSMENT:                            Medication Adherence/Access: Uncontrolled- Will provide some information to help find part D insurance coverage, resolve part B coverage.    Emphysemia: Uncontrolled- Patient has poor medication adherence and barriers as outlined above with insurance. Future need formal assessment with CAT tool.    Tobacco cessation: Uncontrolled-cost barrier needs alternative therapy. Given comorbid conditions, lozenge would be better fit than gum and nicotine inhaler Rx and likely costly.      Hyperlipidemia/Primary prevention of cardiovascular disease: Controlled.    PLAN:                            Counseling: Educated the patient on the benefit of using nicotine patch and nicotine lozenge together.      1. Call Medicare Part B and inform them that you no longer have secondary insurance. You need to do this to allow for the nebulizer medicine to be covered at [facility] in [employer]  2. Call the Senior Linkage Line and ask for help in enrolling in a Medicare Part D plan. Their phone number is [phone]  3. Call [facility] to ask if you qualify for Medical Assistance [phone]. An alternative plan is to call one of these Assisters or Navigators  4. I sent the prescriptions for the nicotine patch and the lozenge to [facility]. If these are too expensive at [facility], consider buying them OTC from [employer] or [employer] or [employer]  5. Keep using the Spiriva once per day until you run out then chance to Duonebs four times per day    Follow-up: In 3 weeks    SUBJECTIVE/OBJECTIVE:                          Zafar Spencer is a 65 year old male coming in for a follow-up visit. He was referred to me from Dr. Barney. Patient was accompanied by wife, Myesha. Today's visit is a follow-up MTM visit from 4/9/21     Reason for visit: Tobacca cessation and inhaler coverage .    Allergies/ADRs: Reviewed in chart  Social History     Tobacco Use     Smoking status: Current Every  "Day Smoker     Packs/day: 0.25     Years: 52.00     Pack years: 13.00     Types: Cigarettes     Smokeless tobacco: Never Used     Tobacco comment: 1/2 pack/day   Substance Use Topics     Alcohol use: No     Drug use: Yes     Frequency: 2.0 times per week     Types: Marijuana     ^Reviewed today  Past Medical History: Reviewed in chart    Medication Adherence/Access: Medication barrier: insurance coverage. Patient has Medicare Part A and B. He was getting prescription insurance through his job however this is no longer active as he is laid off/on unemployment. Patient cannot afford his medications.     Tobacco cessation: Patient is committed to quit smoking. He has tried nicotine patch in the past. He does not like nicotine gum because of his \"jaw problem\". He has cut back on smoking; He is smoking 4 cigarettes per day. He smokes the first cigarette less than 30 minutes of waking up. He did not  Chantix from the pharmacy because he has no part d insurance coverage. Of note, patient smokes marijuana. He stopped E-cigarettes due to headache.     Emphysemia: Patient reports he takes Sprivia Hanihaler 3-4 times a week. He last filled the Spirivia in Janurary 2021, per [facility] for 3 month supply.   He did not  Duoneb from the pharmacy because again due to lack of insurance coverage, call to pharmacy, they are unable to bill Medicare part B because Medicare believes that patient still has secondary coverage (through previous employer, [employer]). He does have the nebulizer machine at home. Patient coughs intermittently, per wife, but patient thinks it is well controlled.     Hyperlipidemia/Primary prevention of cardiovascular disease:  Patient takes atorvastatin 40 mg and aspirin 81 mg.     The 10-year ASCVD risk score ([place] ROSEMARIE Jr., et al., 2013) is: 12.5%    Values used to calculate the score:      Age: 65 years      Sex: Male      Is Non- : No      Diabetic: No      Tobacco smoker: " Yes      Systolic Blood Pressure: 103 mmHg      Is BP treated: No      HDL Cholesterol: 48 mg/dL      Total Cholesterol: 199 mg/dL    Today's Vitals: /69   Pulse 65   ----------------      I spent 40 minutes with this patient today. Dr. Pryor was provided the recommendations above  in clinic today and Dr. Burgess is the authorizing prescriber for this visit through the pharmacist collaborative practice agreement.. A copy of the visit note was provided to the patient's primary care provider.    The patient was given a summary of these recommendations.     Elisabet Schneider IV pharmacy student     Preceptor Attestation:  I was present with the pharmacy student who participated in the service and in the documentation of this note. I have verified the history, personally performed the medical decision making, and have verified the content of the note, which accurately reflects my assessment of the patient and the plan of care.     Brenda Martines, PharmD, BCACP, CDE  [facility]  Phone: [phone]  April 23, 2021 at 8:21 PM    For insurance/tracking purposes, MTM Team Documentation:    Medication Therapy Recommendations  Tobacco use    Current Medication: nicotine (NICODERM CQ) 14 MG/24HR 24 hr patch   Rationale: Cannot afford medication product - Cost - Adherence   Recommendation: Change Medication - stop chantix, start NRT   Status: Accepted per CPA

## 2021-05-05 ENCOUNTER — TELEPHONE (OUTPATIENT)
Dept: FAMILY MEDICINE | Facility: CLINIC | Age: 66
End: 2021-05-05

## 2021-05-11 ENCOUNTER — OFFICE VISIT (OUTPATIENT)
Dept: PHARMACY | Facility: CLINIC | Age: 66
End: 2021-05-11

## 2021-05-11 DIAGNOSIS — Z00.00 HEALTHCARE MAINTENANCE: ICD-10-CM

## 2021-05-11 DIAGNOSIS — E78.5 HYPERLIPIDEMIA, UNSPECIFIED HYPERLIPIDEMIA TYPE: Primary | ICD-10-CM

## 2021-05-11 PROCEDURE — 99606 MTMS BY PHARM EST 15 MIN: CPT | Performed by: PHARMACIST

## 2021-05-11 PROCEDURE — 99607 MTMS BY PHARM ADDL 15 MIN: CPT | Performed by: PHARMACIST

## 2021-05-11 RX ORDER — ATORVASTATIN CALCIUM 40 MG/1
40 TABLET, FILM COATED ORAL DAILY
Start: 2021-05-11 | End: 2022-09-07

## 2021-05-11 NOTE — PATIENT INSTRUCTIONS
Recommendations from today's visit:                                                      1. Your Medicare record has been updated. Your old insurance was removed. On May 25th we should be able to fill the nebulizer medicine through your pharmacy. I will call the pharmacy to help fill this.   2.  your aspirin refill from the pharmacy. If it is too expensive, purchase over the counter. The [employer] store is a place to save money on over the counter medicines.   3. Call and make an appointment with one of the people to talk about prescription insurance options to help make your medicines more affordable.  4. Start using a pillbox to help organize your medicines. It is safe to use all of your medicines in the morning!      It was great to speak with you today!    I value your experience. You may receive a survey via email or text message in the next few days. I would be very thankful for your time in providing feedback.    Pharmacist's contact information:                                                      Please feel free to contact me with any questions or concerns you have.     Brenda Mcfadden  Pharmacist, Certified Diabetes Care and   [facility]  Phone: [phone]  May 11, 2021 at 3:16 PM

## 2021-05-11 NOTE — PROGRESS NOTES
"Medication Therapy Management (MTM) Encounter    ASSESSMENT:                            Medication Adherence/Access: Uncontrolled, biggest barrier to medication adherence currently is cost / lack of insurance.   HLD: Uncontrolled, adherence concerns identified today.   COPD/Tobacco cessation: Improving, great candidate for COVID vaccine. Barrier for tobacco cessation is cost of medicines.     PLAN:                            1. Your Medicare record has been updated. Your old insurance was removed. On May 25th we should be able to fill the nebulizer medicine through your pharmacy. I will call the pharmacy to help fill this.   2.  your aspirin refill from the pharmacy. If it is too expensive, purchase over the counter. The [employer] is a place to save money on over the counter medicines.   3. Call and make an appointment with one of the people to talk about prescription insurance options to help make your medicines more affordable. --> Coached patient to say \"What insurance do I qualify for?\" \"I need prescription insurance\".   4. Start using a pillbox to help organize your medicines. It is safe to use all of your medicines in the morning!      Reprinted letter from 4/15/21 encounter from Dr. Pryor.   Scheduled COVID vaccines for himself and his spouse.  Call to Medicare to remove working insurance from patient's Medicare record. Provided end date of [employer] as 11/30/20 per the insurance record available in Epic    Follow-up: Return in about 4 weeks (around 6/8/2021).      SUBJECTIVE/OBJECTIVE:                          Zafar Spencer is a 65 year old male coming in for a follow-up visit. He was referred to me from Dr Pryor. Patient was accompanied by petey Brunner's SO. Today's visit is a follow-up MTM visit from 4/23/21.    Medicare A: 3/1/14  Medicare B: 11/1/14    Reason for visit: insurance coverage concerns.    Allergies/ADRs: Reviewed in chart  Social History     Tobacco Use     Smoking status: Current Every " Day Smoker     Packs/day: 0.25     Years: 52.00     Pack years: 13.00     Types: Cigarettes     Smokeless tobacco: Never Used     Tobacco comment: 1/2 pack/day   Substance Use Topics     Alcohol use: No     Drug use: Yes     Frequency: 2.0 times per week     Types: Marijuana     ^Reviewed today  Past Medical History: Reviewed in chart    Requests letter today from Dr. Pryor, letter dated 4/15/21.     Medication Adherence/Access: Continues to have issues with insurance coverage. Still unable to get medicines from pharmacy. Forgot all about calling Medicare office, [employer], etc. Unsure if qualifies for medical assistance, currently on unemployment, also on disability. Concern for MA as has bills that he owes from years back. Agreeable today to talk with .     Call to local social security office, they report that they are unable to make change to Medicare record (ie remove previous private insurance), instead have to call Medicare directly.     HLD: Not taking statin every day. When ask what would help him to take consistently, patient reports taking in the morning would be helpful. Also interested in using pillbox. Needs refill of Aspirin.     The 10-year ASCVD risk score ([place] ROSEMARIE Jr., et al., 2013) is: 12.5%    Values used to calculate the score:      Age: 65 years      Sex: Male      Is Non- : No      Diabetic: No      Tobacco smoker: Yes      Systolic Blood Pressure: 103 mmHg      Is BP treated: No      HDL Cholesterol: 48 mg/dL      Total Cholesterol: 199 mg/dL    COPD/Tobacco cessation: Continues on Spiriva daily. Thinks has been able to take fairly consistently. Thinks has been helpful in decreasing cough, phlegm and tightness in chest. Hasn't been able to get NRT yet because needs to save enough money to be able to purchase. Interest today in COVID vaccine, questions today about safety.     ----------------      I spent 90 minutes with this patient today. Dr. Pryor  was provided the recommendations above  via routed note and Dr. Becker is the authorizing prescriber for this visit through the pharmacist collaborative practice agreement.. A copy of the visit note was provided to the patient's primary care provider.    The patient was given a summary of these recommendations.     Brenda Mcfadden, PharmD, CDCES (previously, CDE)  [facility]  Phone: [phone]  May 11, 2021 at 4:04 PM    For insurance/tracking purposes, MTM Team Documentation:    Medication Therapy Recommendations  HLD (hyperlipidemia)    Current Medication: atorvastatin (LIPITOR) 40 MG tablet   Rationale: Patient forgets to take - Adherence - Adherence   Recommendation: Provide Adherence Intervention - provide pillbox and education   Status: Patient Agreed - Adherence/Education         Tobacco use    Current Medication: nicotine (NICODERM CQ) 14 MG/24HR 24 hr patch   Rationale: Cannot afford medication product - Cost - Adherence   Recommendation: Referral to Service  - referral to insurance navigator   Status: Patient Agreed - Adherence/Education

## 2021-05-11 NOTE — LETTER
Patient:  Zafar Spencer  :   1955  MRN:     [id number]      May 11, 2021    Patient Name:  Zafar Spencer    Provider: Brenda Martines [facility]    He was seen in clinic today to discuss breathing, medicines and insurance barriers.       Brenda Mcfadden, PharmD, CDCES (previously, CDE)  [facility]  Phone: [phone]  May 11, 2021 at 4:29 PM

## 2021-05-15 ENCOUNTER — IMMUNIZATION (OUTPATIENT)
Dept: FAMILY MEDICINE | Facility: CLINIC | Age: 66
End: 2021-05-15
Payer: MEDICARE

## 2021-05-15 PROCEDURE — 91300 PR COVID VAC PFIZER DIL RECON 30 MCG/0.3 ML IM: CPT

## 2021-05-15 PROCEDURE — 0001A PR COVID VAC PFIZER DIL RECON 30 MCG/0.3 ML IM: CPT

## 2021-05-20 ENCOUNTER — OFFICE VISIT (OUTPATIENT)
Dept: FAMILY MEDICINE | Facility: CLINIC | Age: 66
End: 2021-05-20
Payer: MEDICARE

## 2021-05-20 VITALS
HEART RATE: 56 BPM | DIASTOLIC BLOOD PRESSURE: 66 MMHG | WEIGHT: 114 LBS | RESPIRATION RATE: 18 BRPM | SYSTOLIC BLOOD PRESSURE: 103 MMHG | OXYGEN SATURATION: 100 % | BODY MASS INDEX: 20.2 KG/M2 | HEIGHT: 63 IN | TEMPERATURE: 97.6 F

## 2021-05-20 DIAGNOSIS — Z83.3 FAMILY HISTORY OF DIABETES MELLITUS: ICD-10-CM

## 2021-05-20 DIAGNOSIS — J43.9 PULMONARY EMPHYSEMA, UNSPECIFIED EMPHYSEMA TYPE (H): ICD-10-CM

## 2021-05-20 DIAGNOSIS — H60.393 OTHER INFECTIVE ACUTE OTITIS EXTERNA OF BOTH EARS: Primary | ICD-10-CM

## 2021-05-20 LAB — HBA1C MFR BLD: 5.8 % (ref 4.1–5.7)

## 2021-05-20 PROCEDURE — 99214 OFFICE O/P EST MOD 30 MIN: CPT | Mod: GC | Performed by: STUDENT IN AN ORGANIZED HEALTH CARE EDUCATION/TRAINING PROGRAM

## 2021-05-20 PROCEDURE — 36415 COLL VENOUS BLD VENIPUNCTURE: CPT | Performed by: STUDENT IN AN ORGANIZED HEALTH CARE EDUCATION/TRAINING PROGRAM

## 2021-05-20 PROCEDURE — 83036 HEMOGLOBIN GLYCOSYLATED A1C: CPT | Performed by: STUDENT IN AN ORGANIZED HEALTH CARE EDUCATION/TRAINING PROGRAM

## 2021-05-20 RX ORDER — NEOMYCIN SULFATE, POLYMYXIN B SULFATE AND HYDROCORTISONE 10; 3.5; 1 MG/ML; MG/ML; [USP'U]/ML
3 SUSPENSION/ DROPS AURICULAR (OTIC) 4 TIMES DAILY
Qty: 10 ML | Refills: 0 | Status: SHIPPED | OUTPATIENT
Start: 2021-05-20 | End: 2021-05-27

## 2021-05-20 RX ORDER — IPRATROPIUM BROMIDE AND ALBUTEROL SULFATE 2.5; .5 MG/3ML; MG/3ML
1 SOLUTION RESPIRATORY (INHALATION) EVERY 6 HOURS PRN
Qty: 30 ML | Refills: 3 | Status: SHIPPED | OUTPATIENT
Start: 2021-05-20 | End: 2022-09-07

## 2021-05-20 RX ORDER — LEVOFLOXACIN 500 MG/1
500 TABLET, FILM COATED ORAL DAILY
Qty: 7 TABLET | Refills: 0 | Status: SHIPPED | OUTPATIENT
Start: 2021-05-20 | End: 2021-07-26

## 2021-05-20 ASSESSMENT — MIFFLIN-ST. JEOR: SCORE: 1189.29

## 2021-05-20 NOTE — PROGRESS NOTES
HPI:       Zafar Spencer is a 65 year old  male  who presents for:    1. BL ear pain, hearing loss and discharge  Patient notes recent intermittent BL ear pain and yellow-noam discharge. Notes some itching and irritation within the ear. Denies any bloody otorrhea. Notes progressive BL hearing loss x 6 years and a history of recurrent ear infections since childhood, at least once a year. Hearing worsens with infections. Was prescribed ear drops in the past without improvement. Uses cotton swabs, hairpins, and needles to clean out his ears regularly. Denies any prior ear surgery. No swimming, but does take bathes every other night. Pt has seen ENT in past. Pt was told he needed hearing aids in the past, but his insurance did not cover them. Last hearing test was years ago.            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     Closed fracture of distal end of right fibula with routine healing, unspecified fracture morphology, subsequent encounter       Current Outpatient Medications   Medication Sig Dispense Refill     aspirin (ASA) 81 MG EC tablet Take 1 tablet (81 mg) by mouth daily 90 tablet 4     atorvastatin (LIPITOR) 40 MG tablet Take 1 tablet (40 mg) by mouth daily       calcium carbonate-vitamin D (OS-TAM) 600-400 MG-UNIT chewable tablet Take 1 chew tab by mouth 2 times daily 180 tablet 1     ipratropium - albuterol 0.5 mg/2.5 mg/3 mL (DUONEB) 0.5-2.5 (3) MG/3ML neb solution Take 1 vial (3 mLs) by nebulization every 6 hours as needed for shortness of breath / dyspnea or wheezing 30 mL 3     nicotine (NICODERM CQ) 14 MG/24HR 24 hr patch Place 1 patch onto the skin every 24 hours 30 patch 1     nicotine (NICORETTE) 4  "MG lozenge Place 1 lozenge (4 mg) inside cheek every hour as needed for smoking cessation 100 lozenge 1     varenicline (CHANTIX AMIRA) 0.5 MG X 11 & 1 MG X 42 tablet Take 0.5 mg tab daily for 3 days, THEN 0.5 mg tab twice daily for 4 days, THEN 1 mg twice daily. 53 tablet 0     Social History: Lives with wife.      Allergies   Allergen Reactions     Latex Rash     Penicillins Rash     rash       No results found for this or any previous visit (from the past 24 hour(s)).         Review of Systems:   10 point ROS negative except noted in above in HPI         Physical Exam:     Vitals:    05/20/21 1011   BP: 103/66   BP Location: Right arm   Pulse: 56   Resp: 18   Temp: 97.6  F (36.4  C)   TempSrc: Oral   SpO2: 100%   Weight: 51.7 kg (114 lb)   Height: 1.588 m (5' 2.5\")     Body mass index is 20.52 kg/m .     GENERAL APPEARANCE: healthy, alert and no distress,  EYES: Eyes grossly normal to inspection,  PERRL  HENT: Swelling occluding majority of ear canal, difficult to visualize TM. White, dewy drops on TM, some scaling. Tenderness to pressure and pulling of pinna. Pinna not edematous or erythematous.  No mastoid tenderness   RESP: lungs clear to auscultation - no rales, rhonchi or wheezes  CV: regular rate and rhythm,  and no murmur, click,  rub or gallop  MS: extremities normal- no gross deformities noted  SKIN: no suspicious lesions or rashes  PSYCH: mood and affect normal/bright    Assessment and Plan     1. Acute otitis externa of both ears   Patient's history and physical exam suggest moderate otitis externa, bacterial vs fungal in origin. Would consider alternatives such as atopic or allergic contact dermatitis. Because of the severity, recommended both oral and topical antibiotics and steroids to treat infection and reduce inflammation. Advised patient to avoid using objects to clean within the ear canal. Given recurrent ear infections that are possibly fungal and family history of diabetes mellitus, also " ordered A1c to evaluate for DM.   - levofloxacin (LEVAQUIN) 500 MG tablet; Take 1 tablet (500 mg) by mouth daily  Dispense: 7 tablet; Refill: 0  - neomycin-polymyxin-hydrocortisone (CORTISPORIN) 3.5-20650-2 otic suspension; Place 3 drops into both ears 4 times daily for 7 days  Dispense: 10 mL; Refill: 0   - Hemoglobin A1c ([facility])  - F/U in 7 days for re-evaluation of symptoms and referral to audiology     2. Pulmonary emphysema, unspecified emphysema type (H)  - Refilled ipratropium - albuterol 0.5 mg/2.5 mg/3 mL (DUONEB) 0.5-2.5 (3) MG/3ML neb solution; Take 1 vial (3 mLs) by nebulization every 6 hours as needed for shortness of breath / dyspnea or wheezing  Dispense: 30 mL; Refill: 3      1 week(s) or as needed if new symptoms arise     Options for treatment and follow-up care were reviewed with the patient and/or guardian. Zafar Spencer and/or guardian engaged in the decision making process and verbalized understanding of the options discussed and agreed with the final plan.      Clair Lugo, MS4  [employer]    Resident/Fellow Attestation   I, Kodak Pryro, was present with the medical/SHAQUILLE student who participated in the service and in the documentation of the note.  I have verified the history and personally performed the physical exam and medical decision making.  I agree with the assessment and plan of care as documented in the note.      Kodak Pryor MD  PGY3      Precepted today with: Foster Burgess MD

## 2021-05-20 NOTE — NURSING NOTE
Patient was seen in clinic today with pt's wife, Myesha. Myesha expressed frustration regarding pt's meds (DuoNeb, nicotine patch, nicotine lozenge, and chantix) not covered by patient's insurance. Myesha also mentioned the last time they were in clinic to see Brenda, Pharm D, Brenda had called Medicare and thought everything was situated.     I informed patient and Myesha, based off of Brenda's last appt note, she had called Medicare to let them know patient no longer have a secondary insurance. Brenda provided instructions and phone numbers for patient and Myesha to call to get the DuoNeb covered at [facility].     Myesha stated she lost all of the paperwork and was not informed of the instructions. I printed out Brenda's instructions from encounter on 4/23/21 and reviewed it with the patient and Myesha. Myesha stated understanding.

## 2021-05-20 NOTE — LETTER
May 25, 2021      Zafar Spencer  [address]  [place] [address]        Dear ,    We are writing to inform you of your test results.    Zafar - your A1C, the number that looks at your blood sugar over a few months, is in pre-diabetic range.  This means you are at risk of developing diabetes over the next few years.  Best course of action is to eat a healthy diet (lots of veggies and fiber) as well as some kind of daily physical activity.  However, Dr. Pryor does not think this contributed to your ear infection    Resulted Orders   Hemoglobin A1c (P FM)   Result Value Ref Range    Hemoglobin A1C 5.8 (H) 4.1 - 5.7 %       If you have any questions or concerns, please call the clinic at the number listed above.       Sincerely,      Kodak Pryor MD

## 2021-05-20 NOTE — PROGRESS NOTES
I have personally reviewed the history and examination as documented by Dr. Pryor and Clair Lugo.  I was present during key portions of the visit and agree with the assessment and plan as documented for 65 yr old male here for bilateral hearing loss and discharge. Oral and otic Abx prescribed. Precautions given. Anticipatory guidance given.     Foster Burgess MD  May 20, 2021  11:11 AM

## 2021-05-25 NOTE — RESULT ENCOUNTER NOTE
"Please send the following in a letter to the patient:     \"Zafar - your A1C, the number that looks at your blood sugar over a few months, is in pre-diabetic range.  This means you are at risk of developing diabetes over the next few years.  Best course of action is to eat a healthy diet (lots of veggies and fiber) as well as some kind of daily physical activity.  However, Dr. Pryor does not think this contributed to your ear infection.\""

## 2021-05-31 ENCOUNTER — RECORDS - HEALTHEAST (OUTPATIENT)
Dept: ADMINISTRATIVE | Facility: CLINIC | Age: 66
End: 2021-05-31

## 2021-05-31 VITALS — BODY MASS INDEX: 19.51 KG/M2 | WEIGHT: 111 LBS

## 2021-06-01 VITALS — HEIGHT: 65 IN | WEIGHT: 114 LBS | BODY MASS INDEX: 18.99 KG/M2

## 2021-06-05 ENCOUNTER — IMMUNIZATION (OUTPATIENT)
Dept: FAMILY MEDICINE | Facility: CLINIC | Age: 66
End: 2021-06-05
Attending: FAMILY MEDICINE
Payer: MEDICARE

## 2021-06-05 ENCOUNTER — RECORDS - HEALTHEAST (OUTPATIENT)
Dept: VASCULAR SURGERY | Facility: CLINIC | Age: 66
End: 2021-06-05

## 2021-06-05 DIAGNOSIS — I87.2 VENOUS (PERIPHERAL) INSUFFICIENCY: ICD-10-CM

## 2021-06-05 PROCEDURE — 91300 PR COVID VAC PFIZER DIL RECON 30 MCG/0.3 ML IM: CPT

## 2021-06-05 PROCEDURE — 0002A PR COVID VAC PFIZER DIL RECON 30 MCG/0.3 ML IM: CPT

## 2021-06-07 ENCOUNTER — OFFICE VISIT (OUTPATIENT)
Dept: PHARMACY | Facility: CLINIC | Age: 66
End: 2021-06-07
Payer: MEDICARE

## 2021-06-07 VITALS
BODY MASS INDEX: 20.38 KG/M2 | HEART RATE: 62 BPM | RESPIRATION RATE: 16 BRPM | WEIGHT: 115 LBS | SYSTOLIC BLOOD PRESSURE: 99 MMHG | TEMPERATURE: 97.8 F | DIASTOLIC BLOOD PRESSURE: 65 MMHG | OXYGEN SATURATION: 96 % | HEIGHT: 63 IN

## 2021-06-07 DIAGNOSIS — Z72.0 TOBACCO USE: ICD-10-CM

## 2021-06-07 DIAGNOSIS — J43.9 PULMONARY EMPHYSEMA, UNSPECIFIED EMPHYSEMA TYPE (H): Primary | ICD-10-CM

## 2021-06-07 PROCEDURE — 99607 MTMS BY PHARM ADDL 15 MIN: CPT | Performed by: PHARMACIST

## 2021-06-07 PROCEDURE — 99606 MTMS BY PHARM EST 15 MIN: CPT | Performed by: PHARMACIST

## 2021-06-07 ASSESSMENT — MIFFLIN-ST. JEOR: SCORE: 1193.83

## 2021-06-07 NOTE — PROGRESS NOTES
"Medication Therapy Management (MTM) Encounter    ASSESSMENT:                            COPD: Uncontrolled, barrier to maintenance inhaler being insurance coverage. Ongoing work to obtain coverage. Scheduled AMBER serving as bridge until can get insurance coverage for LAMA.  Tobacco use: Uncontrolled, same barrier as above.     PLAN:                            1. Call to pharmacy, coverage for nebulizer medicine per pharmacist report.   2. Call to [facility] [employer] [facility], unable to schedule appointment with patient but reviewed patient's situation with representative, they encouraged to call back with patient soon to review possibilities, likely screening for Low Income Subsidy program (\"Extra Help\").     AVS:  1. When you run out of Spiriva, start using the nebulizer treatments four times per day  2. I will call you when I have our appointment day/time with the [facility]    Follow-up: Return in about 2 weeks (around 6/21/2021) for in person.    SUBJECTIVE/OBJECTIVE:                          Zafar Spencer is a 65 year old male coming in for a follow-up visit. He was referred to me from Dr. Pryor.  Patient was accompanied by Myesha, patient's partner. Today's visit is a follow-up MTM visit from 5/11/21.     Reason for visit: COPD f/u.    Allergies/ADRs: Reviewed in chart  Social History     Tobacco Use     Smoking status: Current Every Day Smoker     Packs/day: 0.25     Years: 52.00     Pack years: 13.00     Types: Cigarettes     Smokeless tobacco: Never Used     Tobacco comment: 1/2 pack/day   Substance Use Topics     Alcohol use: No     Drug use: Yes     Frequency: 2.0 times per week     Types: Marijuana     ^Reviewed today  Past Medical History: Reviewed in chart    COPD: Running low on Spiriva, has 2 doses remaining. Using once per day. Still has been unable to obtain nebulizer medicine from pharmacy.   Tobacco use: Continues to cut down, motivated by his partner. Still smoking 3-5 cigarettes per day. Unable " "to obtain NRT products due to lack of insurance products.     Today's Vitals:   BP Readings from Last 1 Encounters:   06/07/21 99/65     Pulse Readings from Last 1 Encounters:   06/07/21 62     Wt Readings from Last 1 Encounters:   06/07/21 115 lb (52.2 kg)     Ht Readings from Last 1 Encounters:   06/07/21 5' 2.5\" (1.588 m)     Estimated body mass index is 20.7 kg/m  as calculated from the following:    Height as of this encounter: 5' 2.5\" (1.588 m).    Weight as of this encounter: 115 lb (52.2 kg).    Temp Readings from Last 1 Encounters:   06/07/21 97.8  F (36.6  C) (Oral)       ----------------      I spent 20 minutes with this patient today. Dr. Pryor was provided the recommendations above  via routed note and Dr. Quintanilla is the authorizing prescriber for this visit through the pharmacist collaborative practice agreement.. A copy of the visit note was provided to the patient's primary care provider.    The patient was given a summary of these recommendations.     Brenda Mcfadden, PharmD, CDCES (previously, CDE)  [facility]  Phone: [phone]  June 7, 2021 at 6:19 AM    For insurance/tracking purposes, MTM Team Documentation:      Medication Therapy Recommendations  Pulmonary emphysema, unspecified emphysema type (H)    Current Medication: ipratropium - albuterol 0.5 mg/2.5 mg/3 mL (DUONEB) 0.5-2.5 (3) MG/3ML neb solution   Rationale: Does not understand instructions - Adherence - Adherence   Recommendation: once finish Spiriva, start scheduled Duonebs   Status: Patient Agreed - Adherence/Education             "

## 2021-06-07 NOTE — PATIENT INSTRUCTIONS
Recommendations from today's visit:                                                      1. When you run out of Spiriva, start using the nebulizer treatments four times per day  2. I will call you when I have our appointment day/time with the [facility]      It was great to speak with you today!    I value your experience. You may receive a survey via email or text message in the next few days. I would be very thankful for your time in providing feedback.    Pharmacist's contact information:                                                      Please feel free to contact me with any questions or concerns you have.     Brenda Mcfadden  Pharmacist, Certified Diabetes Care and   [facility]  Phone: [phone]  June 7, 2021 at 11:20 AM

## 2021-06-11 NOTE — PROGRESS NOTES
Assessment/Plan:        Diagnoses and all orders for this visit:    Tobacco use    Weight loss    Ear drainage, bilateral      62M w/ life long tobacco use, weight loss, previous lung nodules here for follow up.  Still losing weight without any pulmonary symptoms.  He has decreased appetite. GI eval neg.    I recommended that he get his ears rechecked in clinic. No evident otitis media today. I recommended that he discuss his prostate screening questions at his clinic.    There is not a clear cause for your weight loss in your lungs.    Lung cancer screening CT today.    You should stop smoking.  Call [facility] if you'd like information.        Subjective:    Patient ID: Zafar Spencer is a 62 y.o. male.    HPI Comments: 62M here with weight loss and tobacco use.    He is still smoking.  He smokes 4-5 cigarettes per day.  Has tried to quit.    He is still losing weight.  It started years ago.  It is slowly worsening.  No clear provocative or palliative factors.  Symptoms localize to the abdomen.  Associated with early satiety.  Not associated with cough, sputum production, or hemoptysis.    He has some ear drainage that causes some discomfort.      --- from previous  60M w/ hx lung nodules here in follow up.  He also had a GI evaluation because of his weight loss and early satiety and that was negative.    He is still losing weight but only 4 pounds in the last 8 months.  No night sweats, lymph nodes.    He has no pulmonary symptoms.      He is still smoking.  His first cigarette each day is the toughest.  He is only smoking 1-2 per day.    Lung Cancer Screening pre-scan counseling Visit    The patient fits the risk profile of patients who benefit from this screening:  -The patient is >55 years old and <80 years old  -The patient has >30 pack year history (over 30)  -The patient has smoked within the past 15 years  -The patient has no medical comorbidity severe enough that it would cause mortality prior to  mortality due to the lung cancer attempting to be detected.    Discussion with patient regarding the harms associated with LDCT screening include false-negative and false-positive results, incidental findings, overdiagnosis, and radiation exposure were reviewed at length.   The patient understands that pursuing this screening test may result in a biopsy that was not necessary. It may also produced added stress over a nodule that is likely not cancer.    Of 100 patients who get screening, 25 will have a positive scan. Of those 25, only 1 will have cancer.  Overdiagnosis is estimated at 10% of patients-- they would not have been detected in the patient's lifetime without screening. Less than 1% of patients likely had death related to radiation exposure increase.   Average low-dose CT associated with 0.61 to 1.5 mSv. Annual background radiation exposure in the [place] averages 2.4 mS; mammogram is 0.7mSv.    The benefits are reduction in risk of death from lung cancer. The number needed to treat is 320 (for every 320 patients who undergo screening, 1 patient will have a benefit in mortality from early detection from the screening).    Undergoing this screening implies willingness to pursue further potentially invasive testing to discover potential cancer.    All questions were answered.    The patient was counseled regarding smoking cessation and its risk for lung cancer.    The patient's results will be followed in our pulmonary registry and will be recalled based on the findings of the CT scan.        --- from previous  60M referred for lung nodules.  He has a cough.  It has been long standing for many years.  It is worse with cigarette smoking.  It is better with rest and less smoking.  It is associated with thin yellow sputum. No hemoptysis.  Is associated with weight loss.  His weight loss is in the setting of decreased by mouth intake.  He says he's full after just a bite or 2.  He denies dark-colored  stool.  His primary concern is that he is worried about cancer.    He did not serve in the .  He has not been a problem.  He has not lived in a homeless shelter.  He has not been in prison or incarcerated.  He has no known primary or secondary contacts with tuberculosis.  He has no family members known to have tuberculosis.  He has been involved in public housing before.  He has had 3 of his children taken away by Protective services.            Review of Systems   Fatigue, dental problem, wheezing, thirst, cold intolerance  BP 92/56  Pulse (!) 55  Resp 15  Wt 111 lb (50.3 kg)  SpO2 98% Comment: RA  BMI 19.51 kg/m2        Objective:    Physical Exam   Constitutional: He is oriented to person, place, and time. He appears well-developed and well-nourished. No distress.   HENT:   Head: Normocephalic.   Right Ear: External ear normal.   Left Ear: External ear normal.   Nose: Nose normal.   External auditory canal with some skin flaking on L.   Eyes: Pupils are equal, round, and reactive to light. Right eye exhibits no discharge. Left eye exhibits no discharge. No scleral icterus.   Neck: Normal range of motion.   Cardiovascular: Normal rate and regular rhythm.  Exam reveals no gallop and no friction rub.    No murmur heard.  Pulmonary/Chest: Effort normal and breath sounds normal. No respiratory distress. He has no wheezes. He has no rales.   Neurological: He is alert and oriented to person, place, and time.   Skin: Skin is warm and dry. No rash noted. He is not diaphoretic. No erythema. No pallor.   Psychiatric: He has a normal mood and affect. His behavior is normal. Judgment and thought content normal.             Current Outpatient Prescriptions on File Prior to Visit   Medication Sig Dispense Refill     omeprazole (PRILOSEC) 20 MG capsule as needed.       simvastatin (ZOCOR) 5 MG tablet Take 10 mg by mouth bedtime.       No current facility-administered medications on file prior to visit.      Wt 111  lb (50.3 kg)  BMI 19.51 kg/m2    Medical History  There are no active non-hospital problems to display for this patient.    Past Medical History:   Diagnosis Date     COPD (chronic obstructive pulmonary disease)      Emphysema      Lung cancer          Social history- 4-5 cigarettes per day   Allergies  Allergies   Allergen Reactions     Latex      Penicillins                             Data Review - imaging, labs, and ekgs listed below were reviewed by me.  CXR and CT images and EKG tracings interpreted personally.     Past Labs  Lab Requisition on 05/18/2017   Component Date Value     Sodium 05/18/2017 141      Potassium 05/18/2017 4.8      Chloride 05/18/2017 102      CO2 05/18/2017 27      Anion Gap, Calculation 05/18/2017 12      Glucose 05/18/2017 105      Calcium 05/18/2017 10.0      BUN 05/18/2017 20      Creatinine 05/18/2017 0.99      GFR MDRD Af Amer 05/18/2017 >60      GFR MDRD Non Af Amer 05/18/2017 >60      Cholesterol 05/18/2017 199      Triglycerides 05/18/2017 170*     HDL Cholesterol 05/18/2017 40      LDL Calculated 05/18/2017 125      Patient Fasting > 8hrs? 05/18/2017 Unknown      PSA 05/18/2017 1.1

## 2021-06-17 NOTE — PROGRESS NOTES
Results discussed, actions take, and noted in [facility] Epic Chart.    Kodak Pryor MD  [facility] Resident, PGY3

## 2021-06-19 NOTE — PROGRESS NOTES
Audiology Report    Referring Provider:  Dr. Isabel    History:  Zafar Spencer is seen in conjunction with ENT appointment today. He has a history of aural fullness and otorrhea. He reports this has since cleared. He reports concern with decreased hearing bilaterally. He reports a history of loud noise exposure working at [employer] in the "[employer] which becomes very loud per patient..  He denies a history of dizziness, tinnitus and ear surgery.    Results:     Left Ear Right Ear   Otoscopy Very narrow canals Very narrow canals   Pure Tone Audiometry Moderate to severe sloping to severe mixed hearing loss   Moderate sloping to severe sensorineural hearing loss     Word Recognition excellent excellent   Tympanometry flat (Type B)  With small canal volume; possibly against canal wall flat (Type B)  With small canal volume; possibly against canal wall     Transducer: Insert earphones and Circumaural headphones    Reliability was good  and there was good  SRT to PTA agreement.       Plan:  The patient is returned to ENT for follow up.  He should return for retesting with ENT recommendation.  The patient is a candidate for hearing aids, and should consider pending medical clearance.    Please see audiogram under  media  and  audiogram  in the patient s chart.     Dalton Plascencia, CCC-A  [place] Licensed Audiologist #[id number]

## 2021-06-19 NOTE — PROGRESS NOTES
Zafar Spencer is a 63 y.o. male seen in consultation at the request of Dr. Baker for hearing loss.  Patient has noticed gradual hearing loss over years. Denies otologic history surgeries.  Denies tinnitus and vertigo. He has bilateral otitis externa every fall and winter that improves during the summer.      ALLERGY:    Allergies   Allergen Reactions     Latex Rash     Penicillins Rash     rash       MEDICATIONS:     Current Outpatient Prescriptions on File Prior to Visit   Medication Sig Dispense Refill     omeprazole (PRILOSEC) 20 MG capsule Take 20 mg by mouth daily as needed.        simvastatin (ZOCOR) 20 MG tablet Take 20 mg by mouth at bedtime.       Current Facility-Administered Medications on File Prior to Visit   Medication Dose Route Frequency Provider Last Rate Last Dose     [DISCONTINUED] fentaNYL pf injection (SUBLIMAZE)    PRN Alex Roche III, MD   25 mcg at 08/16/18 0951     [DISCONTINUED] lactated Ringers  50 mL/hr Intravenous Continuous Alex Roche III, MD 50 mL/hr at 08/16/18 0911 50 mL/hr at 08/16/18 0911     [DISCONTINUED] midazolam (PF) 1 mg/mL injection (VERSED)    PRN Alex Roche III, MD   1 mg at 08/16/18 0955     [DISCONTINUED] sodium chloride flush 3 mL (NS)  3 mL Intravenous Line Care Alex Roche III, MD           Past Medical/Surgical History, Family History and Social History reviewed in detail and documented separately in the medical record.    Complete Review of Systems:  A 10-point review was performed.  Pertinent positives are noted in the HPI and on a separate scanned document in the chart.    EXAM:  There were no vitals filed for this visit.    Nurse documentation reviewed  and documented separately.    General Appearance: Pleasant, alert, appropriate appearance for age. No acute distress    Head Exam: Normal. Normocephalic, atraumatic.    Eye Exam: Normal external eye, conjunctiva, lids, cornea. Extra-ocular movements are intact.    Left external ear: normal  Left otoscopic  exam:  Narrow lateral EAC. Normal TM     Right external ear: normal  Right otoscopic exam: Narrow lateral  EAC. Normal TM    Nose Exam: Normal external nose. Septum midline. Nasal mucosa normal.  Inferior turbinates normal.    OroPharynx Exam: Dental hygiene adequate. Normal tongue. Normal buccal mucosa. Normal palate.  Normal pharynx. Normal tonsils.    Neck Exam: Supple, no masses or nodes. Trachea and larynx midline.    Thyroid Exam: No tenderness, nodules or enlargement.    Salivary Glands: nontender without masses    Neuro: Alert and oriented times 3, CN 2-12 grossly intact, no nystagmus, PERRL, EOMI, normal speech and gait    Chest/Respiratory Exam: Normal chest wall motion and respiratory effort. No audible stridor or wheezing.    Cardiovascular Exam: Regular rate and rhythm.  No cyanosis, clubbing or edema.    Pulses: carotid pulses normal    ASSESSMENT:  1. Mixed conductive and sensorineural hearing loss of left ear with restricted hearing of right ear    2. Stenosis of both external auditory canals        PLAN: Findings, assessment, and management options were discussed. He is doing well today.  We did discuss hearing amplification.  Would like to see his ears during the season when he is symptomatic.  Will follow up in 3 months.  If bothersome enough, meatoplasty could be considered.

## 2021-06-20 NOTE — LETTER
Letter by Lucinda Hinojosa RN at      Author: Lucinda Hinojosa RN Service: -- Author Type: --    Filed:  Encounter Date: 9/24/2020 Status: (Other)         Zafar Spencer  [address]  [facility] [address]      09/24/20      Dear Zafar,    Your annual lung cancer screening scan is overdue. We have attempted to reach you without success. If you remain interested please call your primary care provider to schedule an office visit so they can do the screening.  We will make no further attempts to call you to schedule at this point if we do not hear from you and assume you are no longer interested in the screening.     Sincerely,  [facility] ([facility]) Lung Cancer Screening Program

## 2021-06-20 NOTE — LETTER
Letter by Lucinda Hinojosa RN at      Author: Lucinda Hinojosa RN Service: -- Author Type: --    Filed:  Encounter Date: 7/16/2020 Status: (Other)         Zafar DIANA Spencer  [address]  [facility] [facility] [address]        July 16, 2020     Dear Zafar,     Its time for your yearly exam to check for lung cancer.      You should have a yearly exam if:  Youre age 55 to 80 (55 to 77, if youre on Medicare)  Youve smoked a pack a day for at least 30 years (or 2 packs a day for at least 15 years)  If you no longer smoke, its been less than 15 years since you quit     Please call to schedule your exam. If you see a physician in the Pulmonary Clinic call [phone] to schedule. If you are not a Pulmonary Clinic patient, call your primary care physicians office to schedule an exam. Many insurance plans require an office visit before you can have this exam. Check your plan for details.      These exams work best when done every year. They are good at finding lung cancer early, but they cant find all lung cancers. If you develop any symptoms (shortness of breath, chest pain, coughing up blood), call your doctor right away.     If you would like help to quit smoking, please talk with your doctor during your next visit. Or, call [employer] at [phone].        We look forward to seeing you soon.      Sincerely,     [facility] ([facility])  Lung Cancer Screening Program

## 2021-06-20 NOTE — LETTER
Letter by Lucinda Hinojosa RN at      Author: Lucinda Hinojosa RN Service: -- Author Type: --    Filed:  Encounter Date: 8/26/2020 Status: (Other)         Zafar Spencer  [address]  [facility] [address]      08/26/20      Dear Zafar,    Its time for your yearly exam to check for lung cancer.   Please call your primary care doctor to schedule a brief visit so your Low Dose CT scan can be ordered at the time of that appointment. Your scan needs to be done within 30 days of your office visit.      You should have a yearly exam if:  Youre age 55 to 80 (55 to 77, if youre on Medicare)  Youve smoked a pack a day for at least 30 years (or 2 packs a day for at least 15 years)  If you no longer smoke, its been less than 15 years since you quit     These exams work best when done every year. They are good at finding lung cancer early, but they cant find all lung cancers. If you develop any symptoms (shortness of breath, chest pain, coughing up blood), call your doctor right away.     If you would like help to quit smoking, please talk with your doctor during your next visit. Or, call [employer] at [phone].        Sincerely,  [facility] ([facility]) Lung Cancer Screening Program

## 2021-06-21 NOTE — PROGRESS NOTES
HPI:  He notes his ears have been treating him well.  No change in hearing.    Past medical history, surgical history, social history, family history, medications, and allergies have been reviewed with the patient and are documented above.    Review of Systems: a 10-system review was performed. Pertinent positives are noted in the HPI and on a separate scanned document in the chart.    PHYSICAL EXAMINATION:  GEN: no acute distress, normocephalic  EYES: extraocular movements are intact, pupils are equal and round. Sclera clear.     EARS:   Both ears inspected under the microscope.  Right EAC is narrow, some cerumn aspirated with suction revealing intact TM.  Left EAC is also very narrow, cerumen suctioned revealing intact drum.  NEURO: CN II-XII are intact bilaterally. alert and oriented. No nystagmus. Gait is normal.    PULM: breathing comfortably on room air, normal chest expansion with respiration  HEART: regular rate and rhythm, no peripheral edema    MEDICAL DECISION-MAKING:   Ears are stable - we should check again in 6 months.

## 2021-07-26 ENCOUNTER — OFFICE VISIT (OUTPATIENT)
Dept: PHARMACY | Facility: CLINIC | Age: 66
End: 2021-07-26

## 2021-07-26 VITALS
OXYGEN SATURATION: 97 % | WEIGHT: 115.75 LBS | DIASTOLIC BLOOD PRESSURE: 53 MMHG | HEIGHT: 63 IN | TEMPERATURE: 97.8 F | BODY MASS INDEX: 20.51 KG/M2 | RESPIRATION RATE: 16 BRPM | HEART RATE: 60 BPM | SYSTOLIC BLOOD PRESSURE: 92 MMHG

## 2021-07-26 DIAGNOSIS — Z72.0 TOBACCO USE: ICD-10-CM

## 2021-07-26 DIAGNOSIS — Z59.71 UNDER OR UNINSURED: Primary | ICD-10-CM

## 2021-07-26 DIAGNOSIS — E78.5 HYPERLIPIDEMIA, UNSPECIFIED HYPERLIPIDEMIA TYPE: ICD-10-CM

## 2021-07-26 DIAGNOSIS — J43.9 PULMONARY EMPHYSEMA, UNSPECIFIED EMPHYSEMA TYPE (H): ICD-10-CM

## 2021-07-26 PROCEDURE — 99606 MTMS BY PHARM EST 15 MIN: CPT | Performed by: PHARMACIST

## 2021-07-26 ASSESSMENT — MIFFLIN-ST. JEOR: SCORE: 1200.04

## 2021-07-26 NOTE — PROGRESS NOTES
Medication Therapy Management (MTM) Encounter    ASSESSMENT:                            Insurance query with SeniorLinkage Line: Unfortunately patient without insurance coverage options until open enrollment in October or until unemployment runs out.   COPD: Uncontrolled currently without coverage for maintenance inhaler will continue with PRN option. Currently without concerns for a lot of symptoms, thankfully!  Smoking cessation: Uncontrolled.   CAD: Controlled.     PLAN:                            Future message sent to help set up appointment September/October 2021 to pursue medicare part D coverage    Follow-up: Return in about 2 months (around 9/26/2021).    SUBJECTIVE/OBJECTIVE:                          Zafar Spencer is a 66 year old male coming in for a follow-up visit. He was referred to me from Dr. Azevedo. Patient was accompanied by Myesha, significant other. Today's visit is a follow-up MTM visit from 6/7/2021.     Reason for visit: insurance counseling.    Allergies/ADRs: Reviewed in chart  Past Medical History: Reviewed in chart  Social History     Tobacco Use     Smoking status: Current Every Day Smoker     Packs/day: 0.25     Years: 52.00     Pack years: 13.00     Types: Cigarettes     Smokeless tobacco: Never Used     Tobacco comment: 1/2 pack/day   Substance Use Topics     Alcohol use: No     Drug use: Yes     Frequency: 2.0 times per week     Types: Marijuana     ^Reviewed today    Insurance query with Senior[employer]age Line: Call with patient to the senior linkage line. Record shows [place] for employed person with disability, ended July 2017. Hasn't had Part D since August 2019. LIS income limit $1630/month, doesn't qualify for this benefit currently with receiving unemployment. Missed window (2 months post loosing employer coverage). $1074 as monthly income limit for medicaid. Unemployment runs through March 2022.   COPD: Patient has completed Spiriva, no insurance for refill. Not using nebulizer but has  "nebulizer and medicine available. No concerns for SOB/cough.   Smoking cessation: Barrier is insurance coverage, paying out of pocket is barrier. Currently smoking 1/2 pack per day. Would like agent to quit but doesn't have capacity to pay out of pocket.   CAD: Taking 1 tablet per day of Aspirin and Atorvastatin.     Today's Vitals:   BP Readings from Last 1 Encounters:   07/26/21 92/53     Pulse Readings from Last 1 Encounters:   07/26/21 60     Wt Readings from Last 1 Encounters:   07/26/21 115 lb 12 oz (52.5 kg)     Ht Readings from Last 1 Encounters:   07/26/21 5' 2.99\" (1.6 m)     Estimated body mass index is 20.51 kg/m  as calculated from the following:    Height as of this encounter: 5' 2.99\" (1.6 m).    Weight as of this encounter: 115 lb 12 oz (52.5 kg).    Temp Readings from Last 1 Encounters:   07/26/21 97.8  F (36.6  C) (Oral)     ----------------      I spent 35 minutes with this patient today. Dr. Azevedo was provided the recommendations above  in clinic today and Dr. Roche is the authorizing prescriber for this visit through the pharmacist collaborative practice agreement.. A copy of the visit note was provided to the patient's primary care provider.    The patient was given a summary of these recommendations.     Brenda Mcfadden, PharmD, CDCES (previously, CDE)  [facility]  Phone: [phone]  July 26, 2021 at 2:46 PM    For insurance/tracking purposes, MTM Team Documentation:   Medication Therapy Recommendations  No medication therapy recommendations to display     "

## 2021-09-24 ENCOUNTER — TELEPHONE (OUTPATIENT)
Dept: FAMILY MEDICINE | Facility: CLINIC | Age: 66
End: 2021-09-24

## 2021-09-24 NOTE — TELEPHONE ENCOUNTER
"Called patient no answer lvmtcb per thad    \"Can you please call to schedule patient for LONG (60 minute) visit with me to discuss insurance options\"    Thank you   -Sarah      "

## 2022-02-04 DIAGNOSIS — E78.5 HYPERLIPIDEMIA, UNSPECIFIED HYPERLIPIDEMIA TYPE: ICD-10-CM

## 2022-02-04 NOTE — TELEPHONE ENCOUNTER
Date of last visit:   05/20/2021    Date of next visit if scheduled: Visit date not found       Pharmacy fax request:  Yes  Patient/Phone call request :  No    BP Readings from Last 3 Encounters:   07/26/21 92/53   06/07/21 99/65   05/20/21 103/66       LDL Cholesterol Calculated   Date Value Ref Range Status   07/13/2018 132 (H) <=129 mg/dL Final       Lab Results   Component Value Date    A1C 5.8 05/20/2021       Last Comprehensive Metabolic Panel:  Sodium   Date Value Ref Range Status   10/23/2020 140.0 133.0 - 144.0 mmol/L Final     Potassium   Date Value Ref Range Status   10/23/2020 4.5 3.4 - 5.3 mmol/L Final     Chloride   Date Value Ref Range Status   10/23/2020 103.0 94.0 - 109.0 mmol/L Final     Carbon Dioxide   Date Value Ref Range Status   10/23/2020 27.0 20.0 - 32.0 mmol/L Final     Anion Gap   Date Value Ref Range Status   06/25/2018 9 5 - 18 mmol/L Final     Glucose   Date Value Ref Range Status   10/23/2020 117.0 (H) 60.0 - 109.0 mg/dL Final     Urea Nitrogen   Date Value Ref Range Status   10/23/2020 13.0 7.0 - 30.0 mg/dL Final     Creatinine   Date Value Ref Range Status   10/23/2020 0.8 0.8 - 1.5 mg/dL Final     GFR Estimate   Date Value Ref Range Status   06/25/2018 >60 >60 mL/min/1.73m2 Final     Calcium   Date Value Ref Range Status   10/23/2020 9.9 8.5 - 10.4 mg/dL Final       Please complete refill and CLOSE ENCOUNTER.  Closing the encounter signifies the refill is complete.

## 2022-02-14 ENCOUNTER — OFFICE VISIT (OUTPATIENT)
Dept: FAMILY MEDICINE | Facility: CLINIC | Age: 67
End: 2022-02-14
Payer: COMMERCIAL

## 2022-02-14 VITALS
SYSTOLIC BLOOD PRESSURE: 99 MMHG | OXYGEN SATURATION: 98 % | BODY MASS INDEX: 21.16 KG/M2 | RESPIRATION RATE: 20 BRPM | HEIGHT: 62 IN | DIASTOLIC BLOOD PRESSURE: 64 MMHG | TEMPERATURE: 98.6 F | HEART RATE: 65 BPM | WEIGHT: 115 LBS

## 2022-02-14 DIAGNOSIS — Z20.822 SUSPECTED 2019 NOVEL CORONAVIRUS INFECTION: ICD-10-CM

## 2022-02-14 DIAGNOSIS — Z76.0 ENCOUNTER FOR MEDICATION REFILL: ICD-10-CM

## 2022-02-14 DIAGNOSIS — E78.5 HYPERLIPIDEMIA, UNSPECIFIED HYPERLIPIDEMIA TYPE: ICD-10-CM

## 2022-02-14 DIAGNOSIS — R91.8 LUNG MASS: ICD-10-CM

## 2022-02-14 DIAGNOSIS — Z72.0 TOBACCO USE: ICD-10-CM

## 2022-02-14 DIAGNOSIS — Z87.891 PERSONAL HISTORY OF TOBACCO USE: ICD-10-CM

## 2022-02-14 DIAGNOSIS — R68.89 ABNORMAL WEIGHT: Primary | ICD-10-CM

## 2022-02-14 PROCEDURE — 99214 OFFICE O/P EST MOD 30 MIN: CPT | Mod: CS | Performed by: STUDENT IN AN ORGANIZED HEALTH CARE EDUCATION/TRAINING PROGRAM

## 2022-02-14 PROCEDURE — G0296 VISIT TO DETERM LDCT ELIG: HCPCS | Performed by: STUDENT IN AN ORGANIZED HEALTH CARE EDUCATION/TRAINING PROGRAM

## 2022-02-14 PROCEDURE — U0003 INFECTIOUS AGENT DETECTION BY NUCLEIC ACID (DNA OR RNA); SEVERE ACUTE RESPIRATORY SYNDROME CORONAVIRUS 2 (SARS-COV-2) (CORONAVIRUS DISEASE [COVID-19]), AMPLIFIED PROBE TECHNIQUE, MAKING USE OF HIGH THROUGHPUT TECHNOLOGIES AS DESCRIBED BY CMS-2020-01-R: HCPCS | Performed by: STUDENT IN AN ORGANIZED HEALTH CARE EDUCATION/TRAINING PROGRAM

## 2022-02-14 PROCEDURE — U0005 INFEC AGEN DETEC AMPLI PROBE: HCPCS | Performed by: STUDENT IN AN ORGANIZED HEALTH CARE EDUCATION/TRAINING PROGRAM

## 2022-02-14 ASSESSMENT — MIFFLIN-ST. JEOR: SCORE: 1180.89

## 2022-02-14 NOTE — PROGRESS NOTES
HPI:       Zafar Spencer is a 66 year old  male with PMH of tobacco use disorder who presents for evaluation of bronchitis per partner    Patient initially brought in as his partner wanted him to be evaluated for bronchitis but visit then became a discussion on inability to gain weight.    Patient states that he has been unable to gain weight for a long time and continues to use protein drinks to supplement his diet.  He notes that he works all the time and so often skips lunch.  He would like help to gain weight and is also concerned about a possible lung malignancy given his extensive smoking history.  Also states that he has a very intermittent cough but has been increasing recently and was worried about bronchitis.  He notes that his cough has improved and is minimal today.    Lung Cancer Screening Shared Decision Making Visit     Zafar Spencer is eligible for lung cancer screening on the basis of the information provided in my signed lung cancer screening order.     I have discussed with patient the risks and benefits of screening for lung cancer with low-dose CT.     The risks include:  radiation exposure: one low dose chest CT has as much ionizing radiation as about 15 chest x-rays or 6 months of background radiation living in [place]    false positives: 96% of positive findings/nodules are NOT cancer, but some might still require additional diagnostic evaluation, including biopsy  over-diagnosis: some slow growing cancers that might never have been clinically significant will be detected and treated unnecessarily     The benefit of early detection of lung cancer is contingent upon adherence to annual screening or more frequent follow up if indicated.     Furthermore, reaping the benefits of screening requires Zafar Spencer to be willing and physically able to undergo diagnostic procedures, if indicated. Although no specific guide is available for determining severity of comorbidities, it is  "reasonable to withhold screening in patients who have greater mortality risk from other diseases.     We did discuss that the only way to prevent lung cancer is to not smoke. Smoking cessation counseling was given, duration < 3 minutes.      I did not offer risk estimation using a calculator such as this one:    ShouldIScreen             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     Closed fracture of distal end of right fibula with routine healing, unspecified fracture morphology, subsequent encounter       Current Outpatient Medications   Medication Sig Dispense Refill     aspirin (ASA) 81 MG EC tablet Take 1 tablet (81 mg) by mouth daily 90 tablet 3     atorvastatin (LIPITOR) 40 MG tablet Take 1 tablet (40 mg) by mouth daily       calcium carbonate-vitamin D (OS-TAM) 600-400 MG-UNIT chewable tablet Take 1 chew tab by mouth 2 times daily 180 tablet 1     ipratropium - albuterol 0.5 mg/2.5 mg/3 mL (DUONEB) 0.5-2.5 (3) MG/3ML neb solution Take 1 vial (3 mLs) by nebulization every 6 hours as needed for shortness of breath / dyspnea or wheezing 30 mL 3          Allergies   Allergen Reactions     Latex Rash     Penicillins Rash     rash       No results found for this or any previous visit (from the past 24 hour(s)).         Review of Systems:     12 point review of systems negative unless stated in HPI           Physical Exam:     Vitals:    02/14/22 1549   BP: 99/64   Pulse: 65   Resp: 20   Temp: 98.6  F (37  C)   TempSrc: Oral   SpO2: 98%   Weight: 52.2 kg (115 lb)   Height: 1.575 m (5' 2\")     Body mass index is 21.03 kg/m .    GENERAL APPEARANCE: healthy, alert and no distress,  EYES: Eyes grossly normal to inspection  RESP: " lungs clear to auscultation - no rales, rhonchi or wheezes  CV: regular rate and rhythm,  and no murmur, click,  rub or gallop  MS: extremities normal- no gross deformities noted  SKIN: no suspicious lesions or rashes  NEURO: Normal strength and tone, sensory exam grossly normal, mentation appears intact, gait normal.  PSYCH: mood and affect normal/bright      Assessment and Plan     1. Abnormal weight  2. Tobacco use  3. Personal history of tobacco use  Patient with complaint of inability to gain weight.  Has very concerned about lung malignancy given history of smoking.  On review his weight appears stable over the past couple of years.  We will do initial work-up to test for any malignancy including a low-dose CT scan.  Recommend patient follow-up in 1 month.  - Comprehensive metabolic panel; Future  - CBC with platelets; Future  - nicotine (NICORETTE) 4 MG lozenge; Place 1 lozenge (4 mg) inside cheek every hour as needed for smoking cessation for 42 days, THEN 1 lozenge (4 mg) every 4 hours as needed for smoking cessation for 14 days, THEN 1 lozenge (4 mg) every 8 hours as needed for smoking cessation.  Dispense: 48 lozenge; Refill: 1  - Prof fee: Shared Decisionmaking for Lung Cancer Screening  - CT Chest Lung Cancer Scrn Low Dose wo; Future    4. Suspected 2019 novel coronavirus infection  Patient will need to reswab as he swabbed his throat. Order placed.   - Symptomatic; Unknown COVID-19 Virus (Coronavirus) by PCR Nasopharyngeal; Future    5. Encounter for medication refill  6. Hyperlipidemia, unspecified hyperlipidemia type  - aspirin (ASA) 81 MG EC tablet; Take 1 tablet (81 mg) by mouth daily  Dispense: 90 tablet; Refill: 3      Options for treatment and follow-up care were reviewed with the patient and/or guardian. Pt and/or guardian engaged in the decision making process and verbalized understanding of the options discussed and agreed with the final plan.    Precepted today with: Alex Henson  MD Jing Sadler MD  [facility] Family Medicine Resident PGY-3  [facility]

## 2022-02-14 NOTE — PROGRESS NOTES
Preceptor Attestation:   Patient seen, evaluated and discussed with the resident. I have verified the content of the note, which accurately reflects my assessment of the patient and the plan of care.   Supervising Physician:  Alex Henson MD

## 2022-02-14 NOTE — PATIENT INSTRUCTIONS
Lung Cancer Screening   Frequently Asked Questions  If you are at high-risk for lung cancer, getting screened with low-dose computed tomography (LDCT) every year can help save your life. This handout offers answers to some of the most common questions about lung cancer screening. If you have other questions, please call [phone] ([phone]).     What is it?  Lung cancer screening uses special X-ray technology to create an image of your lung tissue. The exam is quick and easy and takes less than 10 seconds. We don t give you any medicine or use any needles. You can eat before and after the exam. You don t need to change your clothes as long as the clothing on your chest doesn t contain metal. But, you do need to be able to hold your breath for at least 6 seconds during the exam.    What is the goal of lung cancer screening?  The goal of lung cancer screening is to save lives. Many times, lung cancer is not found until a person starts having physical symptoms. Lung cancer screening can help detect lung cancer in the earliest stages when it may be easier to treat.    Who should be screened for lung cancer?  We suggest lung cancer screening for anyone who is at high-risk for lung cancer. You are in the high-risk group if you:      are between the ages of 55 and 79, and    have smoked at least 1 pack of cigarettes a day for 20 or more years, and    still smoke or have quit within the past 15 years.    However, if you have a new cough or shortness of breath, you should talk to your doctor before being screened.    Why does it matter if I have symptoms?  Certain symptoms can be a sign that you have a condition in your lungs that should be checked and treated by your doctor. These symptoms include fever, chest pain, a new or changing cough, shortness of breath that you have never felt before, coughing up blood or unexplained weight loss. Having any of these symptoms can greatly affect the results of lung  cancer screening.       Should all smokers get an LDCT lung cancer screening exam?  It depends. Lung cancer screening is for a very specific group of men and women who have a history of heavy smoking over a long period of time (see  Who should be screened for lung cancer  above).  I am in the high-risk group, but have been diagnosed with cancer in the past. Is LDCT lung cancer screening right for me?  In some cases, you should not have LDCT lung screening, such as when your doctor is already following your cancer with CT scan studies. Your doctor will help you decide if LDCT lung screening is right for you.  Do I need to have a screening exam every year?  Yes. If you are in the high-risk group described earlier, you should get an LDCT lung cancer screening exam every year until you are 79, or are no longer willing or able to undergo screening and possible procedures to diagnose and treat lung cancer.  How effective is LDCT at preventing death from lung cancer?  Studies have shown that LDCT lung cancer screening can lower the risk of death from lung cancer by 20 percent in people who are at high-risk.  What are the risks?  There are some risks and limitations of LDCT lung cancer screening. We want to make sure you understand the risks and benefits, so please let us know if you have any questions. Your doctor may want to talk with you more about these risks.    Radiation exposure: As with any exam that uses radiation, there is a very small increased risk of cancer. The amount of radiation in LDCT is small--about the same amount a person would get from a mammogram. Your doctor orders the exam when he or she feels the potential benefits outweigh the risks.    False negatives: No test is perfect, including LDCT. It is possible that you may have a medical condition, including lung cancer, that is not found during your exam. This is called a false negative result.    False positives and more testing: LDCT very often finds  something in the lung that could be cancer, but in fact is not. This is called a false positive result. False positive tests often cause anxiety. To make sure these findings are not cancer, you may need to have more tests. These tests will be done only if you give us permission. Sometimes patients need a treatment that can have side effects, such as a biopsy. For more information on false positives, see  What can I expect from the results?     Findings not related to lung cancer: Your LDCT exam also takes pictures of areas of your body next to your lungs. In a very small number of cases, the CT scan will show an abnormal finding in one of these areas, such as your kidneys, adrenal glands, liver or thyroid. This finding may not be serious, but you may need more tests. Your doctor can help you decide what other tests you may need, if any.  What can I expect from the results?  About 1 out of 4 LDCT exams will find something that may need more tests. Most of the time, these findings are lung nodules. Lung nodules are very small collections of tissue in the lung. These nodules are very common, and the vast majority--more than 97 percent--are not cancer (benign). Most are normal lymph nodes or small areas of scarring from past infections.  But, if a small lung nodule is found to be cancer, the cancer can be cured more than 90 percent of the time. To know if the nodule is cancer, we may need to get more images before your next yearly screening exam. If the nodule has suspicious features (for example, it is large, has an odd shape or grows over time), we will refer you to a specialist for further testing.  Will my doctor also get the results?  Yes. Your doctor will get a copy of your results.  Is it okay to keep smoking now that there s a cancer screening exam?  No. Tobacco is one of the strongest cancer-causing agents. It causes not only lung cancer, but other cancers and cardiovascular (heart) diseases as well. The damage  caused by smoking builds over time. This means that the longer you smoke, the higher your risk of disease. While it is never too late to quit, the sooner you quit, the better.  Where can I find help to quit smoking?  The best way to prevent lung cancer is to stop smoking. If you have already quit smoking, congratulations and keep it up! For help on quitting smoking, please call [employer] at 3-733-QUITNOW ([phone]) or the American [employer] Society at 1-416.537.7799 to find local resources near you.  One-on-one health coaching:  If you d prefer to work individually with a health care provider on tobacco cessation, we offer:      Medication Therapy Management:  Our specially trained pharmacists work closely with you and your doctor to help you quit smoking.  Call [phone] or [phone] (toll free).

## 2022-02-14 NOTE — PROGRESS NOTES
Assessment and Plan     ***    Options for treatment and follow-up care were reviewed with the patient and/or guardian. Zafar Spencer and/or guardian engaged in the decision making process and verbalized understanding of the options discussed and agreed with the final plan.    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     Closed fracture of distal end of right fibula with routine healing, unspecified fracture morphology, subsequent encounter       Current Outpatient Medications   Medication Sig Dispense Refill     aspirin (ASA) 81 MG EC tablet Take 1 tablet (81 mg) by mouth daily 90 tablet 3     atorvastatin (LIPITOR) 40 MG tablet Take 1 tablet (40 mg) by mouth daily       calcium carbonate-vitamin D (OS-TAM) 600-400 MG-UNIT chewable tablet Take 1 chew tab by mouth 2 times daily 180 tablet 1     ipratropium - albuterol 0.5 mg/2.5 mg/3 mL (DUONEB) 0.5-2.5 (3) MG/3ML neb solution Take 1 vial (3 mLs) by nebulization every 6 hours as needed for shortness of breath / dyspnea or wheezing 30 mL 3       Social History     Tobacco Use     Smoking status: Current Every Day Smoker     Packs/day: 0.25     Years: 52.00     Pack years: 13.00     Types: Cigarettes     Smokeless tobacco: Never Used     Tobacco comment: 1/2 pack/day   Substance Use Topics     Alcohol use: No     Drug use: Yes     Frequency: 2.0 times per week     Types: Marijuana          Allergies   Allergen Reactions     Latex Rash      Penicillins Rash     rash       No results found for this or any previous visit (from the past 24 hour(s)).         Review of Systems:     10 point ROS negative except for what is noted in HPI          Physical Exam:     There were no vitals filed for this visit.  There is no height or weight on file to calculate BMI.    General: Sitting comfortably. No acute distress.   HEENT: Conjunctivae are clear, nonicteric. EOM are intact with PERRL.  Neck: Thyroid gland is normal and nontender without masses.  Respiratory: No respiratory distress. Lung sounds are clear without rales, ronchi, or wheezes.   Cardiac: RRR. No m/g/r. Radial pulses 2+ bilaterally.  Abdominal: Abdomen is soft and non-tender without distention.  Extremities: Upper and lower extremities grossly normal.  Skin: Skin in warm without rashes.  Neurological: Motor function is grossly normal.  Psychiatric: Good insight. ***

## 2022-02-15 LAB — SARS-COV-2 RNA RESP QL NAA+PROBE: NEGATIVE

## 2022-02-17 ENCOUNTER — LAB (OUTPATIENT)
Dept: LAB | Facility: CLINIC | Age: 67
End: 2022-02-17
Payer: COMMERCIAL

## 2022-02-17 DIAGNOSIS — Z20.822 SUSPECTED 2019 NOVEL CORONAVIRUS INFECTION: ICD-10-CM

## 2022-02-17 DIAGNOSIS — R68.89 ABNORMAL WEIGHT: ICD-10-CM

## 2022-02-17 DIAGNOSIS — Z72.0 TOBACCO USE: ICD-10-CM

## 2022-02-17 LAB
ALBUMIN SERPL-MCNC: 3.6 G/DL (ref 3.5–5)
ALP SERPL-CCNC: 58 U/L (ref 45–120)
ALT SERPL W P-5'-P-CCNC: 10 U/L (ref 0–45)
ANION GAP SERPL CALCULATED.3IONS-SCNC: 8 MMOL/L (ref 5–18)
AST SERPL W P-5'-P-CCNC: 18 U/L (ref 0–40)
BILIRUB SERPL-MCNC: 0.4 MG/DL (ref 0–1)
BUN SERPL-MCNC: 17 MG/DL (ref 8–22)
CALCIUM SERPL-MCNC: 9.3 MG/DL (ref 8.5–10.5)
CHLORIDE BLD-SCNC: 107 MMOL/L (ref 98–107)
CO2 SERPL-SCNC: 24 MMOL/L (ref 22–31)
CREAT SERPL-MCNC: 0.86 MG/DL (ref 0.7–1.3)
ERYTHROCYTE [DISTWIDTH] IN BLOOD BY AUTOMATED COUNT: 12.9 % (ref 10–15)
GFR SERPL CREATININE-BSD FRML MDRD: >90 ML/MIN/1.73M2
GLUCOSE BLD-MCNC: 98 MG/DL (ref 70–125)
HCT VFR BLD AUTO: 40.1 % (ref 40–53)
HGB BLD-MCNC: 13 G/DL (ref 13.3–17.7)
MCH RBC QN AUTO: 30.9 PG (ref 26.5–33)
MCHC RBC AUTO-ENTMCNC: 32.4 G/DL (ref 31.5–36.5)
MCV RBC AUTO: 95 FL (ref 78–100)
PLATELET # BLD AUTO: 294 10E3/UL (ref 150–450)
POTASSIUM BLD-SCNC: 4.4 MMOL/L (ref 3.5–5)
PROT SERPL-MCNC: 6.9 G/DL (ref 6–8)
RBC # BLD AUTO: 4.21 10E6/UL (ref 4.4–5.9)
SODIUM SERPL-SCNC: 139 MMOL/L (ref 136–145)
WBC # BLD AUTO: 7.6 10E3/UL (ref 4–11)

## 2022-02-17 PROCEDURE — 80053 COMPREHEN METABOLIC PANEL: CPT

## 2022-02-17 PROCEDURE — 36415 COLL VENOUS BLD VENIPUNCTURE: CPT

## 2022-02-17 PROCEDURE — 85027 COMPLETE CBC AUTOMATED: CPT

## 2022-02-17 NOTE — LETTER
February 24, 2022      Zafar Spencer  [address]  [facility] [address]      Hi Zafar,     Your blood work overall largely normal. Please follow up with Pulmonology.       Resulted Orders   Comprehensive metabolic panel   Result Value Ref Range    Sodium 139 136 - 145 mmol/L    Potassium 4.4 3.5 - 5.0 mmol/L    Chloride 107 98 - 107 mmol/L    Carbon Dioxide (CO2) 24 22 - 31 mmol/L    Anion Gap 8 5 - 18 mmol/L    Urea Nitrogen 17 8 - 22 mg/dL    Creatinine 0.86 0.70 - 1.30 mg/dL    Calcium 9.3 8.5 - 10.5 mg/dL    Glucose 98 70 - 125 mg/dL    Alkaline Phosphatase 58 45 - 120 U/L    AST 18 0 - 40 U/L    ALT 10 0 - 45 U/L    Protein Total 6.9 6.0 - 8.0 g/dL    Albumin 3.6 3.5 - 5.0 g/dL    Bilirubin Total 0.4 0.0 - 1.0 mg/dL    GFR Estimate >90 >60 mL/min/1.73m2      Comment:      Effective December 21, 2021 eGFRcr in adults is calculated using the 2021 CKD-EPI creatinine equation which includes age and gender (Ramirez et al., NEJ, DOI: 10.1056/CDIAxf8634141)   CBC with platelets   Result Value Ref Range    WBC Count 7.6 4.0 - 11.0 10e3/uL    RBC Count 4.21 (L) 4.40 - 5.90 10e6/uL    Hemoglobin 13.0 (L) 13.3 - 17.7 g/dL    Hematocrit 40.1 40.0 - 53.0 %    MCV 95 78 - 100 fL    MCH 30.9 26.5 - 33.0 pg    MCHC 32.4 31.5 - 36.5 g/dL    RDW 12.9 10.0 - 15.0 %    Platelet Count 294 150 - 450 10e3/uL       If you have any questions or concerns, please call the clinic at the number listed above.       Sincerely,    Dr. Sadler and Alex Henson MD

## 2022-02-19 ENCOUNTER — HOSPITAL ENCOUNTER (OUTPATIENT)
Dept: CT IMAGING | Facility: HOSPITAL | Age: 67
Discharge: HOME | End: 2022-02-19
Attending: FAMILY MEDICINE | Admitting: FAMILY MEDICINE
Payer: COMMERCIAL

## 2022-02-19 DIAGNOSIS — Z87.891 PERSONAL HISTORY OF TOBACCO USE: ICD-10-CM

## 2022-02-19 DIAGNOSIS — Z72.0 TOBACCO USE: ICD-10-CM

## 2022-02-19 PROCEDURE — 71271 CT THORAX LUNG CANCER SCR C-: CPT

## 2022-02-22 NOTE — RESULT ENCOUNTER NOTE
Attempted to reach out to Zafar but wife answered and said he was at work until 9:30 pm. I discussed that I would like to speak with him in addition to her if he should would like that. Will attempt to call back tomorrow.    Dr. Sadler

## 2022-02-23 ENCOUNTER — MEDICAL CORRESPONDENCE (OUTPATIENT)
Dept: HEALTH INFORMATION MANAGEMENT | Facility: CLINIC | Age: 67
End: 2022-02-23
Payer: COMMERCIAL

## 2022-02-23 NOTE — RESULT ENCOUNTER NOTE
Please send letter with the following:    Geovani Stephen,    Your blood work overall largely normal. Please follow up with Pulmonology.    Dr. Sadler

## 2022-02-23 NOTE — RESULT ENCOUNTER NOTE
Got in touch with patient and wife. Informed them of the mass and next steps including a pulmonology referral. They are in agreement.     Dr. Sadler

## 2022-03-09 ENCOUNTER — OFFICE VISIT (OUTPATIENT)
Dept: PULMONOLOGY | Facility: OTHER | Age: 67
End: 2022-03-09
Payer: COMMERCIAL

## 2022-03-09 VITALS
SYSTOLIC BLOOD PRESSURE: 110 MMHG | RESPIRATION RATE: 16 BRPM | BODY MASS INDEX: 20.21 KG/M2 | HEIGHT: 63 IN | WEIGHT: 114.1 LBS | DIASTOLIC BLOOD PRESSURE: 64 MMHG | OXYGEN SATURATION: 96 % | HEART RATE: 68 BPM

## 2022-03-09 DIAGNOSIS — R91.8 LUNG MASS: ICD-10-CM

## 2022-03-09 DIAGNOSIS — Z87.891 PERSONAL HISTORY OF TOBACCO USE: ICD-10-CM

## 2022-03-09 DIAGNOSIS — Z72.0 TOBACCO USE: ICD-10-CM

## 2022-03-09 DIAGNOSIS — F17.200 TOBACCO DEPENDENCE: ICD-10-CM

## 2022-03-09 PROCEDURE — 99204 OFFICE O/P NEW MOD 45 MIN: CPT | Performed by: INTERNAL MEDICINE

## 2022-03-09 NOTE — PROGRESS NOTES
Pulmonary Clinic Outpatient Consultation    Assessment and Plan:   66 year old gentleman with a history of emphsyema and tobacco use presents for evaluation of pulmonary nodules. We reviewed his CT scan in detail. There are 2 concerning nodules, one in the RICHARD and another in the RLL. We discussed the etiology of pulmonary nodules. Given his smoking history these could represent malignancy. Further evaluation with PET/CT is warranted.    He has a fair amount of emphysema on his CT scan. We discussed the importance of smoking cessation.     Recommendations:  - PET/CT at his earliest convenience.   - encouraged smoking cessation. Declines NRT and Chantix at this time  - PFTs in 1 month  - UTD with pneumococcal vaccination. Did not get flu shot this year. He has had 2 doses of covid-19 vaccine. Recommended booster shot - I will defer to his PMD Dr. Azevedo for further discussion on this.   - recommended he follow up with his PMD to discuss strategies to increase weight and caloric intake. Consider nutrition counseling.    All questions answered.  Follow up in 1 month.    Gabo (Nancy Winn MD  [facility] Pulmonary & Critical Care  Clinic [phone]  Fax [phone]       CCx: lung nodules.     HPI: 66 year old gentleman with a history of tobacco use presents for evaluation of pulmonary nodule. He has chronic bronchitis symptoms. No hemoptysis. He has lost some weight. Doesn't eat much.   He works at [employer].  Has a 50 pack year smoking history.  Currently smoking 1/2 pack per day.  He is accompanied by his partner and care taker Myesha.     ROS:  A 12-system review was obtained and was negative with the exception of the symptoms endorsed in the history of present illness.     PMH:  Past Medical History:   Diagnosis Date     Pulmonary nodules      Tobacco dependence        PSH:  Past Surgical History:   Procedure Laterality Date     COLONOSCOPY N/A 8/16/2018    Procedure:  COLONOSCOPY;  Surgeon: Alex Roche III, MD;  Location: [facility] OR;  Service:      OTHER SURGICAL HISTORY      double hernia     OTHER SURGICAL HISTORY      hand (right)       Allergies:  Allergies   Allergen Reactions     Latex Rash     Penicillins Rash     rash       Family HX:  Family History   Problem Relation Age of Onset     Diabetes Mother      Heart Disease Mother      Hypertension No family hx of      Coronary Artery Disease No family hx of      Cancer No family hx of        Social Hx:  Social History     Socioeconomic History     Marital status: Single     Spouse name: Not on file     Number of children: Not on file     Years of education: Not on file     Highest education level: Not on file   Occupational History     Occupation: [employer]   Tobacco Use     Smoking status: Current Every Day Smoker     Packs/day: 1.00     Years: 52.00     Pack years: 52.00     Types: Cigarettes     Smokeless tobacco: Never Used     Tobacco comment: 1/2 pack/day   Substance and Sexual Activity     Alcohol use: No     Drug use: Yes     Frequency: 2.0 times per week     Types: Marijuana     Sexual activity: Not on file   Other Topics Concern     Not on file   Social History Narrative     Not on file     Social Determinants of Health     Financial Resource Strain: Not on file   Food Insecurity: Not on file   Transportation Needs: Not on file   Physical Activity: Not on file   Stress: Not on file   Social Connections: Not on file   Intimate Partner Violence: Not on file   Housing Stability: Not on file       Current Meds:  Current Outpatient Medications   Medication Sig Dispense Refill     aspirin (ASA) 81 MG EC tablet Take 1 tablet (81 mg) by mouth daily 90 tablet 3     atorvastatin (LIPITOR) 40 MG tablet Take 1 tablet (40 mg) by mouth daily       calcium carbonate-vitamin D (OS-TAM) 600-400 MG-UNIT chewable tablet Take 1 chew tab by mouth 2 times daily 180 tablet 1     ipratropium - albuterol 0.5 mg/2.5 mg/3 mL (DUONEB)  "0.5-2.5 (3) MG/3ML neb solution Take 1 vial (3 mLs) by nebulization every 6 hours as needed for shortness of breath / dyspnea or wheezing (Patient not taking: Reported on 3/9/2022) 30 mL 3     nicotine (NICORETTE) 4 MG lozenge Place 1 lozenge (4 mg) inside cheek every hour as needed for smoking cessation for 42 days, THEN 1 lozenge (4 mg) every 4 hours as needed for smoking cessation for 14 days, THEN 1 lozenge (4 mg) every 8 hours as needed for smoking cessation. 48 lozenge 1       Physical Exam:  /64 (BP Location: Left arm, Patient Position: Chair, Cuff Size: Adult Regular)   Pulse 68   Resp 16   Ht 1.6 m (5' 3\")   Wt 51.8 kg (114 lb 1.6 oz)   SpO2 96%   BMI 20.21 kg/m    Gen: awake, alert, oriented, no distress  HEENT: nasal turbinates are unremarkable, no oropharyngeal lesions, no cervical or supraclavicular lymphadenopathy  CV: RRR, no M/G/R  Resp: CTAB, no focal crackles or wheezes  Skin: no apparent rashes  Ext: no cyanosis, clubbing or edema  Neuro: alert, nonfocal    Labs:  Reviewed  Normal chem panel in Feb 2022  hgb 13.0  No diff done    Imaging studies:  Personally reviewed    LDCT 2/19/2022  IMPRESSION:  1.  New irregular nodule within the left upper lobe measuring 12 x 10 x 5 mm may represent lung cancer. Follow-up PET/CT recommended.  2.  Enlarged somewhat flattened area of possible scarring in the right lower lobe, currently 16 x 7 x 3 mm. This could be evaluated at PET/CT as well.  3.  Mild aortic valve calcification correlate clinically for possible valvular stenosis.    Pulmonary Function Testing  n/a  "

## 2022-03-09 NOTE — PATIENT INSTRUCTIONS
Patient Education     PET Scan  A PET scan is a diagnostic imaging test. It is a type of nuclear medicine procedure. This means it uses a tiny amount of a radioactive substance, called a tracer, to look for problems in the body. Typically, this test is used to test for the presence of cancer. A PET scan can also be used to check organs such as your heart and brain. It can also look at specific body tissues like lymph nodes. Some other imaging tests show the structure of a body part. But a PET scan shows changes in how an organ or tissue works. This can help your healthcare provider diagnose problems and create a care plan for you.   Why a PET scan is done  PET scans are often done to help diagnose or manage some conditions. These include:    Cancer    Brain disorders, such as Alzheimer, [place], or Parkinson disease    Brain tumors        Coronary artery disease    Memory problems    Seizure disorders    Stroke  Getting ready for a PET scan  You will be told how to get ready for your scan. It is important to follow these instructions carefully. If you don't, the test may not be accurate. You may need to take the test again.   You may be told to:    Not eat for at least 4 to 6 hours before the scan. This includes gum and mints.    Drink plenty of water before the scan.    Not drink any liquids that have sugar or calories. This includes soda, juice, energy drinks, sweetened bottled water, and alcohol.    Avoid any strenuous activities or exercise for 24 hours before the test. These activities may interfere with your test results.  Tell your healthcare provider about any recent illnesses, all medical conditions, and all medicines you take. Ask if you should take the medicines as usual before your exam. You may need to stop taking them before the test. This includes:     All prescription medicines    Over-the-counter medicines such as aspirin or ibuprofen    Street drugs    Herbs, vitamins, and other  supplements  Also tell your healthcare provider if you:    Are pregnant, or think you may be pregnant    Are breastfeeding    Have ever had an allergic reaction to X-ray dye (contrast medium)    Have diabetes. You will be given special instructions regarding your medicines and food intake.  Follow any other instructions from your healthcare provider.  During your procedure    Tell your healthcare provider if you are afraid of small spaces. This is called claustrophobia. You may be given medicine called a sedative to help you relax before the test.    You will be given a tiny amount of a radioactive substance called a tracer. This will be given through an IV line in a vein in your arm or hand. While your body absorbs the tracer, you will rest quietly on a table or in a reclining chair for 30 to 60 minutes. Once the tracer is absorbed, the scan can be done.    If you are having a CT scan done at the same time, you may be given X-ray dye, also called contrast medium. It will also be given through an IV.    For the scan, you will lie on a cushioned table that will slide into the scanner. The scan itself takes 30 to 90 minutes. During that time, try to stay as still as possible. Move only when you are told.    After your procedure    If you were given a sedative, have an adult friend or family member drive you home.    Over the next few hours, drink plenty of clear fluids. This will help flush the tracer out of your body.    The radioactive material in the tracer will not harm your loved ones.    Possible risks     Radiation exposure. A PET scan has a typical exposure level for medical diagnostic tests. It is felt to be safe.    Allergic reaction to the injected tracer or X-ray dye. This is rare, but may occur.    [employer] ([employer]) last reviewed this educational content on 11/1/2017 2000-2021 [employer]. All rights reserved. This information is not intended as a substitute for professional medical care. Always  follow your healthcare professional's instructions.         ===================    Patient Education     Pulmonary Function Tests  Pulmonary function tests (also called lung function tests) help measure how well your lungs are working. The tests measure the amount of air you breathe out (exhale) and how long it takes for you to exhale completely. These tests are done to diagnose lung conditions such as asthma and COPD (chronic obstructive pulmonary disease). They may be done before and after you take certain medicines. They may also be used to find out if your shortness of breath gets worse with exercise. Over time, pulmonary function tests can help you and your healthcare providers see how well your treatment is working.      Spirometry measures how much air you can take into your lungs and how fast you can blow the air out.   Your experience  A complete pulmonary function test has 3 parts. You may be given the full test or only certain parts. The full test is painless. It can last 45 to 90 minutes. If you get tired, you can take a break between parts of the test.   Before your test  Follow any instructions you are given to get ready for the test. Otherwise, your test may be canceled.     Stop smoking for at least 1 hour before the test, or as directed.    Don't drink alcohol for at least 4 hours before the test.    Ask your healthcare provider if you should stop taking your breathing medicine 4 to 24 hours before the test.    Don't have caffeine and eat only a light meal. Don't eat a large meal less than 2 hours before the test. Also limit the amount of fluids you drink.    Don't exercise heavily for 30 minutes before the test.    Wear loose clothes that don t restrict your breathing.  Tell the healthcare provider if you have any of these symptoms during the test:    Sore mouth    Chest, arm, or jaw pain    Tiredness (fatigue) or dizziness    Severe shortness of breath  During your test  The healthcare provider  will  you during your test. Depending on how many parts of the test you have, you may sit in a chair and breathe through a mouthpiece. Or you may sit in a clear plastic box that looks like a phone thompson. You will wear nose clips so you only breathe through your mouth. The 3 parts of the test are:     Spirometry. You will hold your breath and blow it out fast. Spirometry is repeated at least 3 times to measure your best effort.    Diffusion. You will hold your breath for 10 seconds. The test measures how well your lungs move air into your blood.    Lung volume. You will breathe in different mixtures of air. How much air you breathe in and out is measured. The amount of air that stays in your lungs is also measured.  After your test  After the test, you can go back to your normal diet, activity, and medicines. If you were asked to skip medicines before the test, ask if you should take them now. Your healthcare provider will talk about the test results with you at your next visit.   Pulmonary function tests measure how much air you can exhale, and how quickly. There are several types of pulmonary function graphs that show data from the tests. Some of the things that tests measure include:     FVC (forced vital capacity). This is the total amount of air you can exhale in 1 long breath.    FEV1 (forced expiratory volume in one second). This is the amount of air you exhale in the first second. FEV1 is often expressed as a percentage of FVC.    FEV1/FVC. This is the amount of air exhaled in the first second, compared with the total amount of air exhaled. It s given as a fraction (ratio) or a percentage. In general, the higher the FEV1/FVC, the better.    PEF (peak expiratory flow). This is a measure of how fast you can exhale. It can be tested with spirometry or a peak flow meter.  [employer] last reviewed this educational content on 10/1/2019    3396-7462 [employer]. All rights reserved. This information  is not intended as a substitute for professional medical care. Always follow your healthcare professional's instructions.         =========

## 2022-03-09 NOTE — LETTER
3/9/2022         RE: Zafar Spencer  [address]  [address]  [facility] [address]        Dear Colleague,    Thank you for referring your patient, Zafar Spencer, to the [facility]. Please see a copy of my visit note below.    Pulmonary Clinic Outpatient Consultation    Assessment and Plan:   66 year old gentleman with a history of emphsyema and tobacco use presents for evaluation of pulmonary nodules. We reviewed his CT scan in detail. There are 2 concerning nodules, one in the RICHARD and another in the RLL. We discussed the etiology of pulmonary nodules. Given his smoking history these could represent malignancy. Further evaluation with PET/CT is warranted.    He has a fair amount of emphysema on his CT scan. We discussed the importance of smoking cessation.     Recommendations:  - PET/CT at his earliest convenience.   - encouraged smoking cessation. Declines NRT and Chantix at this time  - PFTs in 1 month  - UTD with pneumococcal vaccination. Did not get flu shot this year. He has had 2 doses of covid-19 vaccine. Recommended booster shot - I will defer to his PMD Dr. Azevedo for further discussion on this.   - recommended he follow up with his PMD to discuss strategies to increase weight and caloric intake. Consider nutrition counseling.    All questions answered.  Follow up in 1 month.    Gabo (Nancy Winn MD  [facility] Pulmonary & Critical Care  Clinic [phone]  Fax [phone]       CCx: lung nodules.     HPI: 66 year old gentleman with a history of tobacco use presents for evaluation of pulmonary nodule. He has chronic bronchitis symptoms. No hemoptysis. He has lost some weight. Doesn't eat much.   He works at [employer].  Has a 50 pack year smoking history.  Currently smoking 1/2 pack per day.  He is accompanied by his partner and care taker Myesha.     ROS:  A 12-system review was obtained and was negative with the exception of the symptoms  endorsed in the history of present illness.     PMH:  Past Medical History:   Diagnosis Date     Pulmonary nodules      Tobacco dependence        PSH:  Past Surgical History:   Procedure Laterality Date     COLONOSCOPY N/A 8/16/2018    Procedure: COLONOSCOPY;  Surgeon: Alex Roche III, MD;  Location: [facility];  Service:      OTHER SURGICAL HISTORY      double hernia     OTHER SURGICAL HISTORY      hand (right)       Allergies:  Allergies   Allergen Reactions     Latex Rash     Penicillins Rash     rash       Family HX:  Family History   Problem Relation Age of Onset     Diabetes Mother      Heart Disease Mother      Hypertension No family hx of      Coronary Artery Disease No family hx of      Cancer No family hx of        Social Hx:  Social History     Socioeconomic History     Marital status: Single     Spouse name: Not on file     Number of children: Not on file     Years of education: Not on file     Highest education level: Not on file   Occupational History     Occupation: [employer]   Tobacco Use     Smoking status: Current Every Day Smoker     Packs/day: 1.00     Years: 52.00     Pack years: 52.00     Types: Cigarettes     Smokeless tobacco: Never Used     Tobacco comment: 1/2 pack/day   Substance and Sexual Activity     Alcohol use: No     Drug use: Yes     Frequency: 2.0 times per week     Types: Marijuana     Sexual activity: Not on file   Other Topics Concern     Not on file   Social History Narrative     Not on file     Social Determinants of Health     Financial Resource Strain: Not on file   Food Insecurity: Not on file   Transportation Needs: Not on file   Physical Activity: Not on file   Stress: Not on file   Social Connections: Not on file   Intimate Partner Violence: Not on file   Housing Stability: Not on file       Current Meds:  Current Outpatient Medications   Medication Sig Dispense Refill     aspirin (ASA) 81 MG EC tablet Take 1 tablet (81 mg) by mouth daily 90 tablet 3      "atorvastatin (LIPITOR) 40 MG tablet Take 1 tablet (40 mg) by mouth daily       calcium carbonate-vitamin D (OS-TAM) 600-400 MG-UNIT chewable tablet Take 1 chew tab by mouth 2 times daily 180 tablet 1     ipratropium - albuterol 0.5 mg/2.5 mg/3 mL (DUONEB) 0.5-2.5 (3) MG/3ML neb solution Take 1 vial (3 mLs) by nebulization every 6 hours as needed for shortness of breath / dyspnea or wheezing (Patient not taking: Reported on 3/9/2022) 30 mL 3     nicotine (NICORETTE) 4 MG lozenge Place 1 lozenge (4 mg) inside cheek every hour as needed for smoking cessation for 42 days, THEN 1 lozenge (4 mg) every 4 hours as needed for smoking cessation for 14 days, THEN 1 lozenge (4 mg) every 8 hours as needed for smoking cessation. 48 lozenge 1       Physical Exam:  /64 (BP Location: Left arm, Patient Position: Chair, Cuff Size: Adult Regular)   Pulse 68   Resp 16   Ht 1.6 m (5' 3\")   Wt 51.8 kg (114 lb 1.6 oz)   SpO2 96%   BMI 20.21 kg/m    Gen: awake, alert, oriented, no distress  HEENT: nasal turbinates are unremarkable, no oropharyngeal lesions, no cervical or supraclavicular lymphadenopathy  CV: RRR, no M/G/R  Resp: CTAB, no focal crackles or wheezes  Skin: no apparent rashes  Ext: no cyanosis, clubbing or edema  Neuro: alert, nonfocal    Labs:  Reviewed  Normal chem panel in Feb 2022  hgb 13.0  No diff done    Imaging studies:  Personally reviewed    LDCT 2/19/2022  IMPRESSION:  1.  New irregular nodule within the left upper lobe measuring 12 x 10 x 5 mm may represent lung cancer. Follow-up PET/CT recommended.  2.  Enlarged somewhat flattened area of possible scarring in the right lower lobe, currently 16 x 7 x 3 mm. This could be evaluated at PET/CT as well.  3.  Mild aortic valve calcification correlate clinically for possible valvular stenosis.    Pulmonary Function Testing  n/a      Again, thank you for allowing me to participate in the care of your patient.        Sincerely,        Gabo Winn MD  "

## 2022-03-28 ENCOUNTER — HOSPITAL ENCOUNTER (OUTPATIENT)
Dept: PET IMAGING | Facility: HOSPITAL | Age: 67
Discharge: HOME | End: 2022-03-28
Attending: INTERNAL MEDICINE | Admitting: INTERNAL MEDICINE
Payer: COMMERCIAL

## 2022-03-28 DIAGNOSIS — R91.8 LUNG MASS: ICD-10-CM

## 2022-03-28 LAB — GLUCOSE BLDC GLUCOMTR-MCNC: 105 MG/DL (ref 70–99)

## 2022-03-28 PROCEDURE — 78815 PET IMAGE W/CT SKULL-THIGH: CPT | Mod: PI

## 2022-03-28 PROCEDURE — 82962 GLUCOSE BLOOD TEST: CPT

## 2022-03-28 PROCEDURE — 343N000001 HC RX 343: Performed by: INTERNAL MEDICINE

## 2022-03-28 PROCEDURE — 78815 PET IMAGE W/CT SKULL-THIGH: CPT | Mod: 26 | Performed by: RADIOLOGY

## 2022-03-28 PROCEDURE — A9552 F18 FDG: HCPCS | Performed by: INTERNAL MEDICINE

## 2022-03-28 RX ADMIN — FLUDEOXYGLUCOSE F-18 10.14 MCI.: 500 INJECTION, SOLUTION INTRAVENOUS at 11:28

## 2022-03-30 ENCOUNTER — TELEPHONE (OUTPATIENT)
Dept: PULMONOLOGY | Facility: OTHER | Age: 67
End: 2022-03-30
Payer: COMMERCIAL

## 2022-03-30 NOTE — TELEPHONE ENCOUNTER
I tried calling Zafar about his PET/CT scan results.  Unable to get through - left a message.    Gabo (Chu) MD Pa  [facility] Pulmonary & Critical Care  Clinic [phone]  Fax [phone]

## 2022-03-30 NOTE — TELEPHONE ENCOUNTER
I left 3 messages for Zafar today to discuss his PET/CT. His wife apparently called and requested call back at the same number - no answer. I left her a message.    Gabo (Chu) MD Pa  [facility] Pulmonary & Critical Care  Clinic [phone]  Fax [phone]

## 2022-03-30 NOTE — TELEPHONE ENCOUNTER
I spoke to Zafar's partner Myesha on the phone.  Zafar is at work and unavailable. Myesha was at the visit with him earlier this month.  We discussed the results of his PET/CT which showed the suspicious, FDG-avid RICHARD nodule close to the pleura. I will discuss the case in our thoracic tumor conference next week. I think the next step would be a tranthoracic needle biopsy. Reviewed risks/benefits.  Myesha will discuss this with Zafar.  I advised her to keep his upcoming appointments on 4/22 for PFTs and 4/25 with me.   I will reach out to Zafar and Myesha after the thoracic tumor board discussion on 4/7.  All questions answered.    Gabo (Chu) MD Pa  [facility] Pulmonary & Critical Care  Clinic [phone]  Fax [phone]

## 2022-04-07 ENCOUNTER — TELEPHONE (OUTPATIENT)
Dept: PULMONOLOGY | Facility: OTHER | Age: 67
End: 2022-04-07
Payer: COMMERCIAL

## 2022-04-07 DIAGNOSIS — R91.8 PULMONARY NODULES: Primary | ICD-10-CM

## 2022-04-07 NOTE — TELEPHONE ENCOUNTER
Reviewed Zafar's scans in thoracic tumor conference.  Dr. Macedo present for Dr. Harrell.  He agreed with referral to thoracic surgery to discuss surgical resection of presumed early stage lung cancer.    I spoke with Myesha on the phone. Zafar was present in her car. They both agreed to thoracic surgery referral.  He is going to have PFTs on 4/22 and I will see him on 4/25 for follow up.    All questions answered.    Gabo (Chu) MD Pa  [facility] Pulmonary & Critical Care  Clinic [phone]  Fax [phone]

## 2022-04-14 ENCOUNTER — OFFICE VISIT (OUTPATIENT)
Dept: PULMONOLOGY | Facility: OTHER | Age: 67
End: 2022-04-14
Attending: INTERNAL MEDICINE
Payer: COMMERCIAL

## 2022-04-14 VITALS
RESPIRATION RATE: 20 BRPM | HEART RATE: 55 BPM | BODY MASS INDEX: 19.77 KG/M2 | SYSTOLIC BLOOD PRESSURE: 100 MMHG | HEIGHT: 63 IN | OXYGEN SATURATION: 99 % | WEIGHT: 111.56 LBS | DIASTOLIC BLOOD PRESSURE: 61 MMHG

## 2022-04-14 DIAGNOSIS — R91.8 PULMONARY NODULES: ICD-10-CM

## 2022-04-14 PROCEDURE — 99204 OFFICE O/P NEW MOD 45 MIN: CPT | Performed by: THORACIC SURGERY (CARDIOTHORACIC VASCULAR SURGERY)

## 2022-04-14 NOTE — PROGRESS NOTES
THORACIC SURGERY - NEW PATIENT OFFICE VISIT      Dear Dr. Azevedo,    I saw Zafar Spencer in consultation for the evaluation and treatment of a subsolid nodule.    HPI  Mr. Zafar Spencer is a 66 year old male patient who presents with an asymptomatic LEFT upper lobe nodule identified on lung cancer screening CT.   2 years ago he broke an ankle and it took 1 years for him to recover. Ever since then, he started with weight loss and has lost ~35 lbs unintentionally (his baseline weight was ~150 lbs).    ECOG performance status  1- Mild physical restriction, sedentary                 Previsit Tests   PFT (date 4/24/2022): pending   CT scan (date 2/19/2022): Left upper lobe 1.2 cm irregularly shaped pulmonary Nodule;  elongated lesion in the RLL 16 mm (both nodules have grown since the last CT in 2020); without mediastinal adenopathy, with no pleural effusion       PET scan (date 3/28/2022): LEFT upper lobe SUV 5, RLL lesion slightly smaller than in February's CT and not hypermetabolic. No suspicious hypermetabolic activity elsewhere    Covid vaccination status: Vaccinated    PMH  Reviewed, as below    Past Medical History:   Diagnosis Date     Pulmonary nodules      Tobacco dependence         PSH  Reviewed, as below    Past Surgical History:   Procedure Laterality Date     COLONOSCOPY N/A 8/16/2018    Procedure: COLONOSCOPY;  Surgeon: Alex Roche III, MD;  Location: [facility];  Service:      OTHER SURGICAL HISTORY      double hernia     OTHER SURGICAL HISTORY      hand (right)        Allergies   Allergen Reactions     Latex Rash     Penicillins Rash     rash       Current Outpatient Medications   Medication     aspirin (ASA) 81 MG EC tablet     atorvastatin (LIPITOR) 40 MG tablet     calcium carbonate-vitamin D (OS-TAM) 600-400 MG-UNIT chewable tablet     ipratropium - albuterol 0.5 mg/2.5 mg/3 mL (DUONEB) 0.5-2.5 (3) MG/3ML neb solution     nicotine (NICORETTE) 4 MG lozenge     No current  "facility-administered medications for this visit.       ETOH: Negative  TOBACCO: 3 cig/day, 50 pack years, he decreased from 1 ppd to 3 cig/day 2 months ago.  OTHER DRUGS: Marijuana    Physical examination  /61 (BP Location: Right arm, Patient Position: Sitting, Cuff Size: Adult Large)   Pulse 55   Resp 20   Ht 1.6 m (5' 3\")   Wt 50.6 kg (111 lb 9 oz)   SpO2 99%   BMI 19.76 kg/m     He is very thin with temporal muscle wasting, but no interosseous muscle wasting.    From a personal perspective, he is here with his significant other, Myesha. He works as a , and he plays the guitar.    IMPRESSION   66 year old male patient with Left upper lobe subsolid nodule.    Stage: Indeterminate pulmonary lesion, IF this were a cancer, clinical stage IA2    PLAN  I spent 45 min on the date of the encounter in chart review, patient visit, review of tests, documentation and/or discussion with other providers about the issues documented above. I reviewed the plan as follows:  Surgery is not a good option for Mr. Spencer. He has had 20% weight loss in the past 2 years for unknown reasons. I believe stereotactic radiation to the LEFT upper lobe nodule will be best for him. The RIGHT lower lobe lesion is smaller than before and was not PET-avid.  He needs a thorough evaluation for his chronic weight loss.    I appreciate the opportunity to participate in the care of your patient and will keep you updated.  Sincerely,    Poli Harrell MD       "

## 2022-04-14 NOTE — LETTER
4/14/2022      RE: Zafar Spencer  [address]  [address]  [place] [facility] [address]       THORACIC SURGERY - NEW PATIENT OFFICE VISIT      Dear Dr. Azevedo,    I saw Zafar Spencer in consultation for the evaluation and treatment of a subsolid nodule.    HPI  Mr. Zafar Spencer is a 66 year old male patient who presents with an asymptomatic LEFT upper lobe nodule identified on lung cancer screening CT.   2 years ago he broke an ankle and it took 1 years for him to recover. Ever since then, he started with weight loss and has lost ~35 lbs unintentionally (his baseline weight was ~150 lbs).    ECOG performance status  1- Mild physical restriction, sedentary                 Previsit Tests   PFT (date 4/24/2022): pending   CT scan (date 2/19/2022): Left upper lobe 1.2 cm irregularly shaped pulmonary Nodule;  elongated lesion in the RLL 16 mm (both nodules have grown since the last CT in 2020); without mediastinal adenopathy, with no pleural effusion       PET scan (date 3/28/2022): LEFT upper lobe SUV 5, RLL lesion slightly smaller than in February's CT and not hypermetabolic. No suspicious hypermetabolic activity elsewhere    Covid vaccination status: Vaccinated    PMH  Reviewed, as below    Past Medical History:   Diagnosis Date     Pulmonary nodules      Tobacco dependence         PSH  Reviewed, as below    Past Surgical History:   Procedure Laterality Date     COLONOSCOPY N/A 8/16/2018    Procedure: COLONOSCOPY;  Surgeon: Alex Roche III, MD;  Location: [facility];  Service:      OTHER SURGICAL HISTORY      double hernia     OTHER SURGICAL HISTORY      hand (right)        Allergies   Allergen Reactions     Latex Rash     Penicillins Rash     rash       Current Outpatient Medications   Medication     aspirin (ASA) 81 MG EC tablet     atorvastatin (LIPITOR) 40 MG tablet     calcium carbonate-vitamin D (OS-TAM) 600-400 MG-UNIT chewable tablet     ipratropium - albuterol 0.5 mg/2.5 mg/3 mL (DUONEB) 0.5-2.5 (3)  "MG/3ML neb solution     nicotine (NICORETTE) 4 MG lozenge     No current facility-administered medications for this visit.       ETOH: Negative  TOBACCO: 3 cig/day, 50 pack years, he decreased from 1 ppd to 3 cig/day 2 months ago.  OTHER DRUGS: Marijuana    Physical examination  /61 (BP Location: Right arm, Patient Position: Sitting, Cuff Size: Adult Large)   Pulse 55   Resp 20   Ht 1.6 m (5' 3\")   Wt 50.6 kg (111 lb 9 oz)   SpO2 99%   BMI 19.76 kg/m     He is very thin with temporal muscle wasting, but no interosseous muscle wasting.    From a personal perspective, he is here with his significant other, Myesha. He works as a , and he plays the guitar.    IMPRESSION   66 year old male patient with Left upper lobe subsolid nodule.    Stage: Indeterminate pulmonary lesion, IF this were a cancer, clinical stage IA2    PLAN  I spent 45 min on the date of the encounter in chart review, patient visit, review of tests, documentation and/or discussion with other providers about the issues documented above. I reviewed the plan as follows:  Surgery is not a good option for Mr. Spencer. He has had 20% weight loss in the past 2 years for unknown reasons. I believe stereotactic radiation to the LEFT upper lobe nodule will be best for him. The RIGHT lower lobe lesion is smaller than before and was not PET-avid.  He needs a thorough evaluation for his chronic weight loss.    I appreciate the opportunity to participate in the care of your patient and will keep you updated.  Sincerely,    MD Poli Lawson MD    "

## 2022-04-15 NOTE — PROGRESS NOTES
RECORDS STATUS - ALL OTHER DIAGNOSIS      Action    Action Taken 4/15/2022 1:56pm LEE ANNJEIMY     I pulled HE Images in PACS     RECORDS RECEIVED FROM:[facility]   DATE RECEIVED: 4/18/2022   NOTES STATUS DETAILS   OFFICE NOTE from referring provider Complete Epic   ref by     OFFICE NOTE from medical oncologist Complete Pulmonology Records are in EPIC   DISCHARGE SUMMARY from hospital     DISCHARGE REPORT from the ER     MEDICATION LIST Complete [facility]   CLINICAL TRIAL TREATMENTS TO DATE     LABS     PATHOLOGY REPORTS     ANYTHING RELATED TO DIAGNOSIS Complete Labs last updated on 3/28/2022   GENONOMIC TESTING     TYPE:     IMAGING (NEED IMAGES & REPORT)     CT SCANS Complete CT Chest Lung 2/19/2022 more in PACS   MRI     MAMMO     ULTRASOUND     PET Complete 3/28/2022

## 2022-04-18 ENCOUNTER — OFFICE VISIT (OUTPATIENT)
Dept: RADIATION ONCOLOGY | Facility: HOSPITAL | Age: 67
End: 2022-04-18
Attending: RADIOLOGY
Payer: COMMERCIAL

## 2022-04-18 ENCOUNTER — PRE VISIT (OUTPATIENT)
Dept: RADIATION ONCOLOGY | Facility: HOSPITAL | Age: 67
End: 2022-04-18

## 2022-04-18 VITALS
WEIGHT: 116.8 LBS | OXYGEN SATURATION: 98 % | RESPIRATION RATE: 18 BRPM | BODY MASS INDEX: 20.69 KG/M2 | HEART RATE: 58 BPM | DIASTOLIC BLOOD PRESSURE: 67 MMHG | SYSTOLIC BLOOD PRESSURE: 110 MMHG | TEMPERATURE: 97.5 F

## 2022-04-18 DIAGNOSIS — C34.12 MALIGNANT NEOPLASM OF UPPER LOBE OF LEFT LUNG (H): ICD-10-CM

## 2022-04-18 DIAGNOSIS — R91.8 PULMONARY NODULES: ICD-10-CM

## 2022-04-18 PROCEDURE — 77470 SPECIAL RADIATION TREATMENT: CPT | Mod: 26 | Performed by: RADIOLOGY

## 2022-04-18 PROCEDURE — 77470 SPECIAL RADIATION TREATMENT: CPT | Performed by: RADIOLOGY

## 2022-04-18 PROCEDURE — 99205 OFFICE O/P NEW HI 60 MIN: CPT | Mod: 25 | Performed by: RADIOLOGY

## 2022-04-18 PROCEDURE — G0463 HOSPITAL OUTPT CLINIC VISIT: HCPCS | Mod: 25

## 2022-04-18 PROCEDURE — 77263 THER RADIOLOGY TX PLNG CPLX: CPT | Performed by: RADIOLOGY

## 2022-04-18 PROCEDURE — G0463 HOSPITAL OUTPT CLINIC VISIT: HCPCS

## 2022-04-18 ASSESSMENT — PAIN SCALES - GENERAL: PAINLEVEL: NO PAIN (0)

## 2022-04-18 NOTE — LETTER
4/18/2022         RE: Zafar Spencer  [address]  [address]  [facility] [address]        Dear Colleague,    Thank you for referring your patient, Zafar Spencer, to the [facility] RADIATION ONCOLOGY [place]. Please see a copy of my visit note below.    [facility] Radiation Oncology Consult Note     Patient: Zafar Spencer  MRN: [id number]  Date of Service: 04/18/2022          Poli Harrell MD  [address]  [place] [address]       Dear Dr. Harrell:    Thank you very much for referring this patient for consideration of radiotherapy. As you know Mr. Spencer is a 66 year old male with a clinical diagnosis of left upper lobe nodule consistent with early stage lung cancer, clinical stage T1 N0 M0.  The patient is not a good candidate for surgery given his poor medical status.  He is referred to radiation oncology for evaluation and consideration of SBRT.    HISTORY OF PRESENT ILLNESS:   Mr. Spencer is a 66 year old male who presents with an asymptomatic LEFT upper lobe nodule identified on lung cancer screening CT. patient had a few screening CT chest over the past few years.  A most recent CT chest on 2/19/2022 reviewed new irregular nodule within the left upper lobe measuring 12 x 10 x 5 mm and a second 16 x 7 x 3 mm abnormality in the right lower lobe.  This is worrisome for malignancy.  PET CT scan on 3/28/2022 showed FDG avid spiculated left upper lobe pleural-based mass suspicious for malignancy.  There is no FDG uptake in the right lower lobe lesion.  There is no radiographic evidence of lymph nodes and systemic metastasis.  Patient is not a good candidate for surgery given his current medical status.  He is referred to radiation oncology for evaluation and consideration of stereotactic radiosurgery for the left upper lobe lesion.    CHEMOTHERAPY HISTORY: Concurrent Chemotherapy: No    RADIATION THERAPY HISTORY: Prior Radiation: No    IMPLANTED CARDIAC DEVICE: none      Current Outpatient Medications   Medication Sig Dispense Refill     aspirin (ASA) 81 MG EC tablet Take 1 tablet (81 mg) by mouth daily 90 tablet 3     atorvastatin (LIPITOR) 40 MG tablet Take 1 tablet (40 mg) by mouth daily (Patient not taking: No sig reported)       calcium carbonate-vitamin D (OS-TAM) 600-400 MG-UNIT chewable tablet Take 1 chew tab by mouth 2 times daily (Patient not taking: No sig reported) 180 tablet 1     ipratropium - albuterol 0.5 mg/2.5 mg/3 mL (DUONEB) 0.5-2.5 (3) MG/3ML neb solution Take 1 vial (3 mLs) by nebulization every 6 hours as needed for shortness of breath / dyspnea or wheezing (Patient not taking: No sig reported) 30 mL 3     nicotine (NICORETTE) 4 MG lozenge Place 1 lozenge (4 mg) inside cheek every hour as needed for smoking cessation for 42 days, THEN 1 lozenge (4 mg) every 4 hours as needed for smoking cessation for 14 days, THEN 1 lozenge (4 mg) every 8 hours as needed for smoking cessation. (Patient not taking: Reported on 4/14/2022) 48 lozenge 1     Past Medical History:   Diagnosis Date     Pulmonary nodules      Tobacco dependence      Past Surgical History:   Procedure Laterality Date     COLONOSCOPY N/A 8/16/2018    Procedure: COLONOSCOPY;  Surgeon: Alex Roche III, MD;  Location: [facility];  Service:      OTHER SURGICAL HISTORY      double hernia     OTHER SURGICAL HISTORY      hand (right)     Latex and Penicillins  Family History   Problem Relation Age of Onset     Diabetes Mother      Heart Disease Mother      Hypertension No family hx of      Coronary Artery Disease No family hx of      Cancer No family hx of      Social History     Socioeconomic History     Marital status: Single     Spouse name: Not on file     Number of children: Not on file     Years of education: Not on file     Highest education level: Not on file   Occupational History     Occupation:    Tobacco Use     Smoking status: Current Every Day Smoker     Packs/day: 1.00      Years: 52.00     Pack years: 52.00     Types: Cigarettes     Smokeless tobacco: Never Used     Tobacco comment: .25 pack currently   Substance and Sexual Activity     Alcohol use: No     Drug use: Yes     Frequency: 2.0 times per week     Types: Marijuana     Sexual activity: Not on file   Other Topics Concern     Not on file   Social History Narrative     Not on file     Social Determinants of Health     Financial Resource Strain: Not on file   Food Insecurity: Not on file   Transportation Needs: Not on file   Physical Activity: Not on file   Stress: Not on file   Social Connections: Not on file   Intimate Partner Violence: Not on file   Housing Stability: Not on file        REVIEW OF SYMPTOMS:  A full 14-point review of systems was performed. Pertinent findings are noted in the HPI.    General  Constitutional  Constitutional (WDL): Exceptions to WDL  Weight Loss: 5 to less than 10% from baseline OR intervention not indicated  EENT     Respiratory  Respiratory  Respiratory (WDL): Exceptions to WDL  Cough: Mild symptoms OR nonprescription intervention indicated  Dyspnea: Shortness of breath with moderate exertion  Cardiovascular  Cardiovascular  Cardiovascular (WDL): All cardiovascular elements are within defined limits  Gastrointestinal  Gastrointestinal  Gastrointestinal (WDL): All gastrointestinal elements are within defined limits  Musculoskeletal  Musculoskeletal and Connective Tissue Disorders  Musculoskeletal & Connective (WDL): All musculoskeletal & connective elements are within defined limits  Integumentary  Integumentary  Integumentary (WDL): All integumentary elements are within defined limits  Neurological  Neurosensory  Neurosensory (WDL): All neurosensory elements are within defined limits  Genitourinary/Reproductive  Genitourinary  Genitourinary (WDL): All genitourinary elements are within defined limits  Lymphatic     Pain  Pain Score: No Pain (0)  AUA Assessment                                                               Accompanied by  Accompanied By: spouse    ECOG Status: 1    Imaging: Reviewed    Objective:     PHYSICAL EXAMINATION:    /67   Pulse 58   Temp 97.5  F (36.4  C) (Oral)   Resp 18   Wt 53 kg (116 lb 12.8 oz)   SpO2 98%   BMI 20.69 kg/m      Gen: Alert, in NAD  Eyes: PERRL, EOMI, sclera anicteric  Pulm: No wheezing, stridor or respiratory distress  CV: Well-perfused, no cyanosis, no pedal edema  Back: No step-offs or pain to palpation along the thoracolumbar spine  Rectal: Deferred  : Deferred  Musculoskeletal: Normal muscle bulk and tone  Skin: Normal color and turgor  Neurologic: A/Ox3, CN II-XII intact, normal gait and station  Psychiatric: Appropriate mood and affect    Intent of Therapy: Curative  Side effects that may occur during or within weeks after Radiation Therapy      Fatigue and general weakness     Darkening, irritation, itchiness, redness, dryness and peeling of the skin of the chest    Loss of chest and armpit hair    Painful swallowing limiting solid and liquid intake and causing dehydration    Nausea, vomiting and decrease in appetite    Side effects that may occur months or years after Radiation Therapy       Development of another tumor or cancer    Lung inflammation or fibrosis causing cough, fever, and shortness of breath     Fracture of ribs    Permanent narrowing or obstruction of the esophagus    Fluid compressing the lung (pleural effusion)    Coronary artery blockage causing angina pain or a heart attack    Thickening, telangiectasias (development of spider like blood vessels in the skin) and ulceration of the skin of the chest    Poor healing after a trauma or surgery in the irradiated areas    Nerve damage resulting in loss of strength or sensation    The risks, benefits and alternatives to radiation therapy were outlined with the patient. All questions were answered and a consent was signed.     Impression     Clinical diagnosis of left upper lobe  nodule consistent with early stage lung cancer, clinical stage T1 N0 M0.  The patient is not a good candidate for surgery given his poor medical status.     Assessment & Plan:     I have personally reviewed his upcoming medical record today.  I have also reviewed his most recent radiology study including PET CT scan.  This is a 66-year-old gentleman with the recent finding of left upper lobe nodule with staging PET CT scan showed FDG avid nodule with no evidence of lymph nodes and systemic metastasis.  Finding is consistent with lung cancer.  The possible treatment options including surgery, systemic therapy, and radiation therapy has been discussed with patient in detail and at great lengths.  The possible risks and side effects of radiation therapy has also been explained to the patient and his family.  Questions are answered to patient's satisfaction.  Patient is not a good candidate for surgery given his current medical status.  I agree the patient is a good candidate to consider stereotactic radiosurgery.  I believe the patient can be potentially benefit by radiation therapy for local control and a better survival.  The pros and cons of different options has also been discussed with the patient.  After long discussion, the patient elected to proceed with SBRT as his treatment choice for his left lung cancer being aware of potential risks and side effects involved.  We will proceed with CT-guided needle biopsy and fiducial placement in the next few weeks.  The patient is scheduled to return to radiation oncology 1 week after markers placement for simulation.  I plan to give radiation therapy to a total dose of 5000 cGy in 5 treatments using SBRT.    Again, thank you very much for the referral and allowing me to participate in the care of this patient.  If you have any questions or concerns about this consultation, please do not hesitate to call.  I spent approximately 60 minutes today with the patient and 80%  time was used for counseling.      Sincerely,          Janessa Yanes MD, PhD  Department of Radiation Oncology   [facility] Radiation Oncology  Tel: [phone]  Page: [phone]    [facility]  [address] [place] [address]     [facility]   [address]    [place] [address]    CC:  Patient Care Team:  Kirk Azevedo MD as PCP - General (Student in organized health care education/training program)  Kirk Azevedo MD as Assigned PCP  Janessa Yanes MD as MD (Radiation Oncology)        Radiation Therapy Patient Education    Person involved with teaching: Patient and Wife    Patient educational needs for self management of treatment-related side effects assessment completed.  EPIC Patient Ed tab contains Patient Learning Assessment    Education Materials Given  Radiation Therapy and You, Understanding Radiation Therapy, Radiation Therapy Team, Radiation Therapy Treatment, Radiation Therapy: Managing Short-Term Side Effects, Radiosurgery Pathway, Welcome Letter and Insurance PA Information    Educational Topics Discussed  Understanding Radiation Therapy  Side effects expected  Radiosurgery Pathway    Response To Teaching  More review necessary    Referrals sent: None    Chemotherapy?  No          Again, thank you for allowing me to participate in the care of your patient.        Sincerely,        Janessa Yanes MD

## 2022-04-18 NOTE — PROGRESS NOTES
[facility] Radiation Oncology Consult Note     Patient: Zafar Spencer  MRN: [id number]  Date of Service: 04/18/2022          Poli Harrell MD  [address]  [place] [address]       Dear Dr. Harrell:    Thank you very much for referring this patient for consideration of radiotherapy. As you know Mr. Spencer is a 66 year old male with a clinical diagnosis of left upper lobe nodule consistent with early stage lung cancer, clinical stage T1 N0 M0.  The patient is not a good candidate for surgery given his poor medical status.  He is referred to radiation oncology for evaluation and consideration of SBRT.    HISTORY OF PRESENT ILLNESS:   Mr. Spencer is a 66 year old male who presents with an asymptomatic LEFT upper lobe nodule identified on lung cancer screening CT. patient had a few screening CT chest over the past few years.  A most recent CT chest on 2/19/2022 reviewed new irregular nodule within the left upper lobe measuring 12 x 10 x 5 mm and a second 16 x 7 x 3 mm abnormality in the right lower lobe.  This is worrisome for malignancy.  PET CT scan on 3/28/2022 showed FDG avid spiculated left upper lobe pleural-based mass suspicious for malignancy.  There is no FDG uptake in the right lower lobe lesion.  There is no radiographic evidence of lymph nodes and systemic metastasis.  Patient is not a good candidate for surgery given his current medical status.  He is referred to radiation oncology for evaluation and consideration of stereotactic radiosurgery for the left upper lobe lesion.    CHEMOTHERAPY HISTORY: Concurrent Chemotherapy: No    RADIATION THERAPY HISTORY: Prior Radiation: No    IMPLANTED CARDIAC DEVICE: none     Current Outpatient Medications   Medication Sig Dispense Refill     aspirin (ASA) 81 MG EC tablet Take 1 tablet (81 mg) by mouth daily 90 tablet 3     atorvastatin (LIPITOR) 40 MG tablet Take 1 tablet (40 mg) by mouth daily (Patient not taking: No sig reported)        calcium carbonate-vitamin D (OS-TAM) 600-400 MG-UNIT chewable tablet Take 1 chew tab by mouth 2 times daily (Patient not taking: No sig reported) 180 tablet 1     ipratropium - albuterol 0.5 mg/2.5 mg/3 mL (DUONEB) 0.5-2.5 (3) MG/3ML neb solution Take 1 vial (3 mLs) by nebulization every 6 hours as needed for shortness of breath / dyspnea or wheezing (Patient not taking: No sig reported) 30 mL 3     nicotine (NICORETTE) 4 MG lozenge Place 1 lozenge (4 mg) inside cheek every hour as needed for smoking cessation for 42 days, THEN 1 lozenge (4 mg) every 4 hours as needed for smoking cessation for 14 days, THEN 1 lozenge (4 mg) every 8 hours as needed for smoking cessation. (Patient not taking: Reported on 4/14/2022) 48 lozenge 1     Past Medical History:   Diagnosis Date     Pulmonary nodules      Tobacco dependence      Past Surgical History:   Procedure Laterality Date     COLONOSCOPY N/A 8/16/2018    Procedure: COLONOSCOPY;  Surgeon: Alex Roche III, MD;  Location: [facility];  Service:      OTHER SURGICAL HISTORY      double hernia     OTHER SURGICAL HISTORY      hand (right)     Latex and Penicillins  Family History   Problem Relation Age of Onset     Diabetes Mother      Heart Disease Mother      Hypertension No family hx of      Coronary Artery Disease No family hx of      Cancer No family hx of      Social History     Socioeconomic History     Marital status: Single     Spouse name: Not on file     Number of children: Not on file     Years of education: Not on file     Highest education level: Not on file   Occupational History     Occupation:    Tobacco Use     Smoking status: Current Every Day Smoker     Packs/day: 1.00     Years: 52.00     Pack years: 52.00     Types: Cigarettes     Smokeless tobacco: Never Used     Tobacco comment: .25 pack currently   Substance and Sexual Activity     Alcohol use: No     Drug use: Yes     Frequency: 2.0 times per week     Types: Marijuana     Sexual  activity: Not on file   Other Topics Concern     Not on file   Social History Narrative     Not on file     Social Determinants of Health     Financial Resource Strain: Not on file   Food Insecurity: Not on file   Transportation Needs: Not on file   Physical Activity: Not on file   Stress: Not on file   Social Connections: Not on file   Intimate Partner Violence: Not on file   Housing Stability: Not on file        REVIEW OF SYMPTOMS:  A full 14-point review of systems was performed. Pertinent findings are noted in the HPI.    General  Constitutional  Constitutional (WDL): Exceptions to WDL  Weight Loss: 5 to less than 10% from baseline OR intervention not indicated  EENT     Respiratory  Respiratory  Respiratory (WDL): Exceptions to WDL  Cough: Mild symptoms OR nonprescription intervention indicated  Dyspnea: Shortness of breath with moderate exertion  Cardiovascular  Cardiovascular  Cardiovascular (WDL): All cardiovascular elements are within defined limits  Gastrointestinal  Gastrointestinal  Gastrointestinal (WDL): All gastrointestinal elements are within defined limits  Musculoskeletal  Musculoskeletal and Connective Tissue Disorders  Musculoskeletal & Connective (WDL): All musculoskeletal & connective elements are within defined limits  Integumentary  Integumentary  Integumentary (WDL): All integumentary elements are within defined limits  Neurological  Neurosensory  Neurosensory (WDL): All neurosensory elements are within defined limits  Genitourinary/Reproductive  Genitourinary  Genitourinary (WDL): All genitourinary elements are within defined limits  Lymphatic     Pain  Pain Score: No Pain (0)  AUA Assessment                                                              Accompanied by  Accompanied By: spouse    ECOG Status: 1    Imaging: Reviewed    Objective:     PHYSICAL EXAMINATION:    /67   Pulse 58   Temp 97.5  F (36.4  C) (Oral)   Resp 18   Wt 53 kg (116 lb 12.8 oz)   SpO2 98%   BMI 20.69  kg/m      Gen: Alert, in NAD  Eyes: PERRL, EOMI, sclera anicteric  Pulm: No wheezing, stridor or respiratory distress  CV: Well-perfused, no cyanosis, no pedal edema  Back: No step-offs or pain to palpation along the thoracolumbar spine  Rectal: Deferred  : Deferred  Musculoskeletal: Normal muscle bulk and tone  Skin: Normal color and turgor  Neurologic: A/Ox3, CN II-XII intact, normal gait and station  Psychiatric: Appropriate mood and affect    Intent of Therapy: Curative  Side effects that may occur during or within weeks after Radiation Therapy      Fatigue and general weakness     Darkening, irritation, itchiness, redness, dryness and peeling of the skin of the chest    Loss of chest and armpit hair    Painful swallowing limiting solid and liquid intake and causing dehydration    Nausea, vomiting and decrease in appetite    Side effects that may occur months or years after Radiation Therapy       Development of another tumor or cancer    Lung inflammation or fibrosis causing cough, fever, and shortness of breath     Fracture of ribs    Permanent narrowing or obstruction of the esophagus    Fluid compressing the lung (pleural effusion)    Coronary artery blockage causing angina pain or a heart attack    Thickening, telangiectasias (development of spider like blood vessels in the skin) and ulceration of the skin of the chest    Poor healing after a trauma or surgery in the irradiated areas    Nerve damage resulting in loss of strength or sensation    The risks, benefits and alternatives to radiation therapy were outlined with the patient. All questions were answered and a consent was signed.     Impression     Clinical diagnosis of left upper lobe nodule consistent with early stage lung cancer, clinical stage T1 N0 M0.  The patient is not a good candidate for surgery given his poor medical status.     Assessment & Plan:     I have personally reviewed his upcoming medical record today.  I have also reviewed his  most recent radiology study including PET CT scan.  This is a 66-year-old gentleman with the recent finding of left upper lobe nodule with staging PET CT scan showed FDG avid nodule with no evidence of lymph nodes and systemic metastasis.  Finding is consistent with lung cancer.  The possible treatment options including surgery, systemic therapy, and radiation therapy has been discussed with patient in detail and at great lengths.  The possible risks and side effects of radiation therapy has also been explained to the patient and his family.  Questions are answered to patient's satisfaction.  Patient is not a good candidate for surgery given his current medical status.  I agree the patient is a good candidate to consider stereotactic radiosurgery.  I believe the patient can be potentially benefit by radiation therapy for local control and a better survival.  The pros and cons of different options has also been discussed with the patient.  After long discussion, the patient elected to proceed with SBRT as his treatment choice for his left lung cancer being aware of potential risks and side effects involved.  We will proceed with CT-guided needle biopsy and fiducial placement in the next few weeks.  The patient is scheduled to return to radiation oncology 1 week after markers placement for simulation.  I plan to give radiation therapy to a total dose of 5000 cGy in 5 treatments using SBRT.    Again, thank you very much for the referral and allowing me to participate in the care of this patient.  If you have any questions or concerns about this consultation, please do not hesitate to call.  I spent approximately 60 minutes today with the patient and 80% time was used for counseling.      Sincerely,          Janessa Yanes MD, PhD  Department of Radiation Oncology   [facility] Radiation Oncology  Tel: [phone]  Page: [phone]    [id number] [place], [place] [id number] [place] [address]    [address] [facility] [facility] [place] [address]    CC:  Patient Care Team:  Kirk Azevedo MD as PCP - General (Student in organized health care education/training program)  Kirk Azevedo MD as Assigned PCP  Janessa Yanes MD as MD (Radiation Oncology)

## 2022-04-22 ENCOUNTER — ALLIED HEALTH/NURSE VISIT (OUTPATIENT)
Dept: PULMONOLOGY | Facility: OTHER | Age: 67
End: 2022-04-22
Attending: INTERNAL MEDICINE
Payer: COMMERCIAL

## 2022-04-22 DIAGNOSIS — Z87.891 PERSONAL HISTORY OF TOBACCO USE: ICD-10-CM

## 2022-04-22 LAB — HGB BLD-MCNC: 12.5 G/DL

## 2022-04-22 PROCEDURE — 94726 PLETHYSMOGRAPHY LUNG VOLUMES: CPT | Performed by: INTERNAL MEDICINE

## 2022-04-22 PROCEDURE — 94729 DIFFUSING CAPACITY: CPT | Performed by: INTERNAL MEDICINE

## 2022-04-22 PROCEDURE — 94375 RESPIRATORY FLOW VOLUME LOOP: CPT | Performed by: INTERNAL MEDICINE

## 2022-04-22 PROCEDURE — 85018 HEMOGLOBIN: CPT

## 2022-04-25 ENCOUNTER — OFFICE VISIT (OUTPATIENT)
Dept: PULMONOLOGY | Facility: OTHER | Age: 67
End: 2022-04-25
Payer: COMMERCIAL

## 2022-04-25 VITALS
HEART RATE: 56 BPM | WEIGHT: 115 LBS | BODY MASS INDEX: 20.37 KG/M2 | SYSTOLIC BLOOD PRESSURE: 104 MMHG | DIASTOLIC BLOOD PRESSURE: 66 MMHG | OXYGEN SATURATION: 96 %

## 2022-04-25 DIAGNOSIS — I12.9 HYPERTENSIVE CHRONIC KIDNEY DISEASE WITH STAGE 1 THROUGH STAGE 4 CHRONIC KIDNEY DISEASE, OR UNSPECIFIED CHRONIC KIDNEY DISEASE: ICD-10-CM

## 2022-04-25 DIAGNOSIS — E44.0 MODERATE PROTEIN-CALORIE MALNUTRITION (H): Primary | ICD-10-CM

## 2022-04-25 PROCEDURE — 99214 OFFICE O/P EST MOD 30 MIN: CPT | Performed by: INTERNAL MEDICINE

## 2022-04-25 NOTE — LETTER
4/25/2022         RE: Zafar Spencer  [address]  [address]  [facility] [address]        Dear Colleague,    Thank you for referring your patient, Zafar Spencer, to the [facility]. Please see a copy of my visit note below.    Pulmonary Clinic Follow-up Visit    Assessment and Plan:   66 year old gentleman with a history of emphsyema and tobacco use presents for evaluation of pulmonary nodules and smoking history. PET/CT showed FDG-avid RICHARD nodule concerning for early stage lung cancer. He is not a surgical candidate due to malnutrition. He is going to have CT guided biopsy and hopefully SBRT next week.  We reviewed his PFTs which, although the testing did not meet ATS standards actually showed fairly normal lung function that has been stable since 2015.  I did recommend he continue efforts at smoking cessation.  He also needs to gain weight. I made a referral to a nutritionist today. I asked him to follow up with his primary care doctor to discuss additional strategies for gaining weight/muscle mass.   Vaccination management per his PMD.    He can follow up with me as needed.  All questions answered.      Gabo Winn MD (Avi)  [facility]  Clinic [phone]  Fax [phone]    CCx: COPD, lung nodule follow up    HPI: Interim history: I last saw Zafar on 3/9/2022. Since that time, he saw Dr. Harrell from thoracic surgery and was deemed not to be a surgical candidate due to weight loss and poor nutrition status.   Since I last saw him he reports he's doing well. No significant cough, hemoptysis, dyspnea is stable.  His weight is stable  He is accompanied by his sig other Myesha.  He is going to have CT guided lung biopsy with fiducial placement next week.     ROS:  A 12-system review was obtained and was negative with the exception of the symptoms endorsed in the history of present illness.    PMH:  Past Medical History:    Diagnosis Date     Pulmonary nodules      Tobacco dependence        PSH:  Past Surgical History:   Procedure Laterality Date     COLONOSCOPY N/A 8/16/2018    Procedure: COLONOSCOPY;  Surgeon: Alex Roche III, MD;  Location: [facility];  Service:      OTHER SURGICAL HISTORY      double hernia     OTHER SURGICAL HISTORY      hand (right)       Allergies:  Allergies   Allergen Reactions     Latex Rash     Penicillins Rash     rash       Family HX:  Family History   Problem Relation Age of Onset     Diabetes Mother      Heart Disease Mother      Hypertension No family hx of      Coronary Artery Disease No family hx of      Cancer No family hx of        Social Hx:  Social History     Socioeconomic History     Marital status: Single     Spouse name: Not on file     Number of children: Not on file     Years of education: Not on file     Highest education level: Not on file   Occupational History     Occupation: [employer]   Tobacco Use     Smoking status: Current Every Day Smoker     Packs/day: 1.00     Years: 52.00     Pack years: 52.00     Types: Cigarettes     Smokeless tobacco: Never Used     Tobacco comment: .25 pack currently   Substance and Sexual Activity     Alcohol use: No     Drug use: Yes     Frequency: 2.0 times per week     Types: Marijuana     Sexual activity: Not on file   Other Topics Concern     Not on file   Social History Narrative     Not on file     Social Determinants of Health     Financial Resource Strain: Not on file   Food Insecurity: Not on file   Transportation Needs: Not on file   Physical Activity: Not on file   Stress: Not on file   Social Connections: Not on file   Intimate Partner Violence: Not on file   Housing Stability: Not on file       Current Meds:  Current Outpatient Medications   Medication Sig Dispense Refill     aspirin (ASA) 81 MG EC tablet Take 1 tablet (81 mg) by mouth daily (Patient not taking: Reported on 4/25/2022) 90 tablet 3     atorvastatin (LIPITOR) 40 MG tablet  Take 1 tablet (40 mg) by mouth daily (Patient not taking: No sig reported)       calcium carbonate-vitamin D (OS-TAM) 600-400 MG-UNIT chewable tablet Take 1 chew tab by mouth 2 times daily (Patient not taking: Reported on 4/25/2022) 180 tablet 1     ipratropium - albuterol 0.5 mg/2.5 mg/3 mL (DUONEB) 0.5-2.5 (3) MG/3ML neb solution Take 1 vial (3 mLs) by nebulization every 6 hours as needed for shortness of breath / dyspnea or wheezing (Patient not taking: Reported on 4/25/2022) 30 mL 3     nicotine (NICORETTE) 4 MG lozenge Place 1 lozenge (4 mg) inside cheek every hour as needed for smoking cessation for 42 days, THEN 1 lozenge (4 mg) every 4 hours as needed for smoking cessation for 14 days, THEN 1 lozenge (4 mg) every 8 hours as needed for smoking cessation. (Patient not taking: Reported on 4/25/2022) 48 lozenge 1       Physical Exam:  /66   Pulse 56   Wt 52.2 kg (115 lb)   SpO2 96%   BMI 20.37 kg/m    Gen: awake, alert, oriented, no distress  HEENT: nasal turbinates are unremarkable, no oropharyngeal lesions, no cervical or supraclavicular lymphadenopathy  CV: RRR, no M/G/R  Resp: CTAB, no focal crackles or wheezes  Skin: no apparent rashes  Ext: no cyanosis, clubbing or edema  Neuro: alert, nonfocal    Labs:  reviewed    Imaging studies:  Personally reviewed    PET/CT 3/28/2022  IMPRESSION:   In this patient with history of two indeterminant pulmonary nodules:     1. FDG-avid, spiculated left upper lobe pleural-based cavitating  nodule, suspicious for malignancy.      2. No FDG uptake associated with the waxing/waning right lower lobe  linear nodular opacity.     LDCT 2/19/2022  IMPRESSION:  1.  New irregular nodule within the left upper lobe measuring 12 x 10 x 5 mm may represent lung cancer. Follow-up PET/CT recommended.  2.  Enlarged somewhat flattened area of possible scarring in the right lower lobe, currently 16 x 7 x 3 mm. This could be evaluated at PET/CT as well.  3.  Mild aortic valve  calcification correlate clinically for possible valvular stenosis.    Pulmonary Function Testing  4/22/2022  FEV1 2.83L 106%  FVC 98%  Ratio 0.84  No BD testing done  %  DLco 78% aline for hgb  Flow volume loop with possible variable extrathoracic obstruction  Testing did not meet ATS standards.       Again, thank you for allowing me to participate in the care of your patient.        Sincerely,        Gaob Winn MD

## 2022-04-25 NOTE — PROGRESS NOTES
Pulmonary Clinic Follow-up Visit    Assessment and Plan:   66 year old gentleman with a history of emphsyema and tobacco use presents for evaluation of pulmonary nodules and smoking history. PET/CT showed FDG-avid RICHARD nodule concerning for early stage lung cancer. He is not a surgical candidate due to malnutrition. He is going to have CT guided biopsy and hopefully SBRT next week.  We reviewed his PFTs which, although the testing did not meet ATS standards actually showed fairly normal lung function that has been stable since 2015.  I did recommend he continue efforts at smoking cessation.  He also needs to gain weight. I made a referral to a nutritionist today. I asked him to follow up with his primary care doctor to discuss additional strategies for gaining weight/muscle mass.   Vaccination management per his PMD.    He can follow up with me as needed.  All questions answered.      Gabo Winn MD (Avi)  [facility]monary [facility] Critical [facility]  Clinic [phone]  Fax [phone]    CCx: COPD, lung nodule follow up    HPI: Interim history: I last saw Zafar on 3/9/2022. Since that time, he saw Dr. Harrell from thoracic surgery and was deemed not to be a surgical candidate due to weight loss and poor nutrition status.   Since I last saw him he reports he's doing well. No significant cough, hemoptysis, dyspnea is stable.  His weight is stable  He is accompanied by his sig other Myesha.  He is going to have CT guided lung biopsy with fiducial placement next week.     ROS:  A 12-system review was obtained and was negative with the exception of the symptoms endorsed in the history of present illness.    PMH:  Past Medical History:   Diagnosis Date     Pulmonary nodules      Tobacco dependence        PSH:  Past Surgical History:   Procedure Laterality Date     COLONOSCOPY N/A 8/16/2018    Procedure: COLONOSCOPY;  Surgeon: Alex Roche III, MD;  Location: [facility];  Service:       OTHER SURGICAL HISTORY      double hernia     OTHER SURGICAL HISTORY      hand (right)       Allergies:  Allergies   Allergen Reactions     Latex Rash     Penicillins Rash     rash       Family HX:  Family History   Problem Relation Age of Onset     Diabetes Mother      Heart Disease Mother      Hypertension No family hx of      Coronary Artery Disease No family hx of      Cancer No family hx of        Social Hx:  Social History     Socioeconomic History     Marital status: Single     Spouse name: Not on file     Number of children: Not on file     Years of education: Not on file     Highest education level: Not on file   Occupational History     Occupation:    Tobacco Use     Smoking status: Current Every Day Smoker     Packs/day: 1.00     Years: 52.00     Pack years: 52.00     Types: Cigarettes     Smokeless tobacco: Never Used     Tobacco comment: .25 pack currently   Substance and Sexual Activity     Alcohol use: No     Drug use: Yes     Frequency: 2.0 times per week     Types: Marijuana     Sexual activity: Not on file   Other Topics Concern     Not on file   Social History Narrative     Not on file     Social Determinants of Health     Financial Resource Strain: Not on file   Food Insecurity: Not on file   Transportation Needs: Not on file   Physical Activity: Not on file   Stress: Not on file   Social Connections: Not on file   Intimate Partner Violence: Not on file   Housing Stability: Not on file       Current Meds:  Current Outpatient Medications   Medication Sig Dispense Refill     aspirin (ASA) 81 MG EC tablet Take 1 tablet (81 mg) by mouth daily (Patient not taking: Reported on 4/25/2022) 90 tablet 3     atorvastatin (LIPITOR) 40 MG tablet Take 1 tablet (40 mg) by mouth daily (Patient not taking: No sig reported)       calcium carbonate-vitamin D (OS-TAM) 600-400 MG-UNIT chewable tablet Take 1 chew tab by mouth 2 times daily (Patient not taking: Reported on 4/25/2022) 180 tablet 1     ipratropium  - albuterol 0.5 mg/2.5 mg/3 mL (DUONEB) 0.5-2.5 (3) MG/3ML neb solution Take 1 vial (3 mLs) by nebulization every 6 hours as needed for shortness of breath / dyspnea or wheezing (Patient not taking: Reported on 4/25/2022) 30 mL 3     nicotine (NICORETTE) 4 MG lozenge Place 1 lozenge (4 mg) inside cheek every hour as needed for smoking cessation for 42 days, THEN 1 lozenge (4 mg) every 4 hours as needed for smoking cessation for 14 days, THEN 1 lozenge (4 mg) every 8 hours as needed for smoking cessation. (Patient not taking: Reported on 4/25/2022) 48 lozenge 1       Physical Exam:  /66   Pulse 56   Wt 52.2 kg (115 lb)   SpO2 96%   BMI 20.37 kg/m    Gen: awake, alert, oriented, no distress  HEENT: nasal turbinates are unremarkable, no oropharyngeal lesions, no cervical or supraclavicular lymphadenopathy  CV: RRR, no M/G/R  Resp: CTAB, no focal crackles or wheezes  Skin: no apparent rashes  Ext: no cyanosis, clubbing or edema  Neuro: alert, nonfocal    Labs:  reviewed    Imaging studies:  Personally reviewed    PET/CT 3/28/2022  IMPRESSION:   In this patient with history of two indeterminant pulmonary nodules:     1. FDG-avid, spiculated left upper lobe pleural-based cavitating  nodule, suspicious for malignancy.      2. No FDG uptake associated with the waxing/waning right lower lobe  linear nodular opacity.     LDCT 2/19/2022  IMPRESSION:  1.  New irregular nodule within the left upper lobe measuring 12 x 10 x 5 mm may represent lung cancer. Follow-up PET/CT recommended.  2.  Enlarged somewhat flattened area of possible scarring in the right lower lobe, currently 16 x 7 x 3 mm. This could be evaluated at PET/CT as well.  3.  Mild aortic valve calcification correlate clinically for possible valvular stenosis.    Pulmonary Function Testing  4/22/2022  FEV1 2.83L 106%  FVC 98%  Ratio 0.84  No BD testing done  %  DLco 78% aline for hgb  Flow volume loop with possible variable extrathoracic  obstruction  Testing did not meet ATS standards.

## 2022-04-26 ENCOUNTER — TELEPHONE (OUTPATIENT)
Dept: INTERVENTIONAL RADIOLOGY/VASCULAR | Facility: HOSPITAL | Age: 67
End: 2022-04-26

## 2022-04-26 DIAGNOSIS — Z11.59 ENCOUNTER FOR SCREENING FOR OTHER VIRAL DISEASES: Primary | ICD-10-CM

## 2022-04-26 LAB
DLCOCOR-%PRED-PRE: 78 %
DLCOCOR-PRE: 16.73 ML/MIN/MMHG
DLCOUNC-%PRED-PRE: 73 %
DLCOUNC-PRE: 15.65 ML/MIN/MMHG
DLCOUNC-PRED: 21.2 ML/MIN/MMHG
ERV-%PRED-PRE: 106 %
ERV-PRE: 1.18 L
ERV-PRED: 1.11 L
EXPTIME-PRE: 6.34 SEC
FEF2575-%PRED-PRE: 134 %
FEF2575-PRE: 2.99 L/SEC
FEF2575-PRED: 2.22 L/SEC
FEFMAX-%PRED-PRE: 70 %
FEFMAX-PRE: 5.08 L/SEC
FEFMAX-PRED: 7.24 L/SEC
FEV1-%PRED-PRE: 106 %
FEV1-PRE: 2.83 L
FEV1FEV6-PRE: 84 %
FEV1FEV6-PRED: 78 %
FEV1FVC-PRE: 84 %
FEV1FVC-PRED: 78 %
FEV1SVC-PRE: 84 %
FEV1SVC-PRED: 73 %
FIFMAX-PRE: 1.92 L/SEC
FRCPLETH-%PRED-PRE: 132 %
FRCPLETH-PRE: 4.32 L
FRCPLETH-PRED: 3.25 L
FVC-%PRED-PRE: 98 %
FVC-PRE: 3.38 L
FVC-PRED: 3.42 L
IC-%PRED-PRE: 87 %
IC-PRE: 2.2 L
IC-PRED: 2.52 L
RVPLETH-%PRED-PRE: 135 %
RVPLETH-PRE: 3.14 L
RVPLETH-PRED: 2.32 L
TLCPLETH-%PRED-PRE: 114 %
TLCPLETH-PRE: 6.51 L
TLCPLETH-PRED: 5.71 L
VA-%PRED-PRE: 107 %
VA-PRE: 5.39 L
VC-%PRED-PRE: 93 %
VC-PRE: 3.38 L
VC-PRED: 3.63 L

## 2022-04-26 NOTE — IR NOTE
IR Procedure Pre-call    Spoke with: wife  Arrival and procedure time: 05/03   Patient has : Yes  Patient has someone to stay overnight:Yes If angiogram must have responsible adult for 24 hours.  Patient is taking blood thinners:No pt stopped his ASA per his wife and PCP.   Patient has H&P within 30 days:Yes   Patient has covid test:No given apt line, she will make the apt.   Patient NPO 8 hours prior: Yes    Pre-call completed.   April 26, 2022 1:11 PM  Munira Owens RN

## 2022-04-29 ENCOUNTER — OFFICE VISIT (OUTPATIENT)
Dept: FAMILY MEDICINE | Facility: CLINIC | Age: 67
End: 2022-04-29
Payer: COMMERCIAL

## 2022-04-29 VITALS
HEART RATE: 64 BPM | WEIGHT: 113 LBS | SYSTOLIC BLOOD PRESSURE: 99 MMHG | RESPIRATION RATE: 18 BRPM | BODY MASS INDEX: 20.02 KG/M2 | TEMPERATURE: 97.9 F | OXYGEN SATURATION: 98 % | DIASTOLIC BLOOD PRESSURE: 64 MMHG | HEIGHT: 63 IN

## 2022-04-29 DIAGNOSIS — H90.0 CONDUCTIVE HEARING LOSS, BILATERAL: ICD-10-CM

## 2022-04-29 DIAGNOSIS — Z01.818 PREOP GENERAL PHYSICAL EXAM: Primary | ICD-10-CM

## 2022-04-29 LAB — SARS-COV-2 RNA RESP QL NAA+PROBE: NEGATIVE

## 2022-04-29 PROCEDURE — 99214 OFFICE O/P EST MOD 30 MIN: CPT | Mod: CS | Performed by: STUDENT IN AN ORGANIZED HEALTH CARE EDUCATION/TRAINING PROGRAM

## 2022-04-29 PROCEDURE — U0003 INFECTIOUS AGENT DETECTION BY NUCLEIC ACID (DNA OR RNA); SEVERE ACUTE RESPIRATORY SYNDROME CORONAVIRUS 2 (SARS-COV-2) (CORONAVIRUS DISEASE [COVID-19]), AMPLIFIED PROBE TECHNIQUE, MAKING USE OF HIGH THROUGHPUT TECHNOLOGIES AS DESCRIBED BY CMS-2020-01-R: HCPCS | Performed by: STUDENT IN AN ORGANIZED HEALTH CARE EDUCATION/TRAINING PROGRAM

## 2022-04-29 PROCEDURE — U0005 INFEC AGEN DETEC AMPLI PROBE: HCPCS | Performed by: STUDENT IN AN ORGANIZED HEALTH CARE EDUCATION/TRAINING PROGRAM

## 2022-04-29 NOTE — PATIENT INSTRUCTIONS
Preparing for Your Surgery  Getting started  A nurse will call you to review your health history and instructions. They will give you an arrival time based on your scheduled surgery time. Please be ready to share:    Your doctor's clinic name and phone number    Your medical, surgical and anesthesia history    A list of allergies and sensitivities    A list of medicines, including herbal treatments and over-the-counter drugs    Whether the patient has a legal guardian (ask how to send us the papers in advance)  Please tell us if you're pregnant--or if there's any chance you might be pregnant. Some surgeries may injure a fetus (unborn baby), so they require a pregnancy test. Surgeries that are safe for a fetus don't always need a test, and you can choose whether to have one.   If you have a child who's having surgery, please ask for a copy of Preparing for Your Child's Surgery.    Preparing for surgery    Within 30 days of surgery: Have a pre-op exam (sometimes called an H&P, or History and Physical). This can be done at a clinic or pre-operative center.  ? If you're having a , you may not need this exam. Talk to your care team.    At your pre-op exam, talk to your care team about all medicines you take. If you need to stop any medicines before surgery, ask when to start taking them again.  ? We do this for your safety. Many medicines can make you bleed too much during surgery. Some change how well surgery (anesthesia) drugs work.    Call your insurance company to let them know you're having surgery. (If you don't have insurance, call [phone].)    Call your clinic if there's any change in your health. This includes signs of a cold or flu (sore throat, runny nose, cough, rash, fever). It also includes a scrape or scratch near the surgery site.    If you have questions on the day of surgery, call your hospital or surgery center.  COVID testing  You may need to be tested for COVID-19 before having  surgery. If so, we will give you instructions.  Eating and drinking guidelines  For your safety: Unless your surgeon tells you otherwise, follow the guidelines below.    Eat and drink as usual until 8 hours before surgery. After that, no food or milk.    Drink clear liquids until 2 hours before surgery. These are liquids you can see through, like water, Gatorade and Propel Water. You may also have black coffee and tea (no cream or milk).    Nothing by mouth within 2 hours of surgery. This includes gum, candy and breath mints.    If you drink alcohol: Stop drinking it the night before surgery.    If your care team tells you to take medicine on the morning of surgery, it's okay to take it with a sip of water.  Preventing infection    Shower or bathe the night before and morning of your surgery. Follow the instructions your clinic gave you. (If no instructions, use regular soap.)    Don't shave or clip hair near your surgery site. We'll remove the hair if needed.    Don't smoke or vape the morning of surgery. You may chew nicotine gum up to 2 hours before surgery. A nicotine patch is okay.  ? Note: Some surgeries require you to completely quit smoking and nicotine. Check with your surgeon.    Your care team will make every effort to keep you safe from infection. We will:  ? Clean our hands often with soap and water (or an alcohol-based hand rub).  ? Clean the skin at your surgery site with a special soap that kills germs.  ? Give you a special gown to keep you warm. (Cold raises the risk of infection.)  ? Wear special hair covers, masks, gowns and gloves during surgery.  ? Give antibiotic medicine, if prescribed. Not all surgeries need antibiotics.  What to bring on the day of surgery    Photo ID and insurance card    Copy of your health care directive, if you have one    Glasses and hearing aides (bring cases)  ? You can't wear contacts during surgery    Inhaler and eye drops, if you use them (tell us about these when  you arrive)    CPAP machine or breathing device, if you use them    A few personal items, if spending the night    If you have . . .  ? A pacemaker, ICD (cardiac defibrillator) or other implant: Bring the ID card.  ? An implanted stimulator: Bring the remote control.  ? A legal guardian: Bring a copy of the certified (court-stamped) guardianship papers.  Please remove any jewelry, including body piercings. Leave jewelry and other valuables at home.  If you're going home the day of surgery    You must have a responsible adult drive you home. They should stay with you overnight as well.    If you don't have someone to stay with you, and you aren't safe to go home alone, we may keep you overnight. Insurance often won't pay for this.  After surgery  If it's hard to control your pain or you need more pain medicine, please call your surgeon's office.  Questions?   If you have any questions for your care team, list them here: _________________________________________________________________________________________________________________________________________________________________________ ____________________________________ ____________________________________ ____________________________________  For informational purposes only. Not to replace the advice of your health care provider. Copyright   2003, 2019 [facility]. All rights reserved. Clinically reviewed by Gayathri Alegre MD. [employer] 570213 - REV 07/21.

## 2022-04-29 NOTE — PROGRESS NOTES
[facility] [address] [place] [address]  Phone: [phone]  Fax: [phone]  Primary Provider: Kirk Azevedo  Pre-op Performing Provider: CRISTIAN CHAKRABORTY    PREOPERATIVE EVALUATION:  Today's date: 4/29/2022    Zafar Spencer is a 66 year old male who presents for a preoperative evaluation.    Surgical Information:  Surgery/Procedure: Lung Biopsy   Surgery Location: [facility] radiology   Surgeon: Dr. Yanes  Surgery Date: 5/3/22  Time of Surgery: 9:20AM  Where patient plans to recover: At home with family  Fax number for surgical facility: Note does not need to be faxed, will be available electronically in Epic.    Type of Anesthesia Anticipated: to be determined    Assessment & Plan     Lung Biopsy Pre-Op:  -The proposed surgical procedure is considered LOW risk.  -Aspirin: continue to hold    RECOMMENDATION:  APPROVAL GIVEN to proceed with proposed procedure, without further diagnostic evaluation.    Review of external notes as documented above         Subjective     HPI related to upcoming procedure:   - Possibly lung cancer. Needs biopsy    Preop Questions 4/29/2022   1. Have you ever had a heart attack or stroke? No   2. Have you ever had surgery on your heart or blood vessels, such as a stent placement, a coronary artery bypass, or surgery on an artery in your head, neck, heart, or legs? No   3. Do you have chest pain with activity? No   4. Do you have a history of  heart failure? No   5. Do you currently have a cold, bronchitis or symptoms of other infection? No   6. Do you have a cough, shortness of breath, or wheezing? No   7. Do you or anyone in your family have previous history of blood clots? No   8. Do you or does anyone in your family have a serious bleeding problem such as prolonged bleeding following surgeries or cuts? No   9. Have you ever had problems with anemia or been told to take iron pills? No   10. Have you had any abnormal blood  loss such as black, tarry or bloody stools? No   11. Have you ever had a blood transfusion? No   12. Are you willing to have a blood transfusion if it is medically needed before, during, or after your surgery? Yes   13. Have you or any of your relatives ever had problems with anesthesia? No   14. Do you have sleep apnea, excessive snoring or daytime drowsiness? No   15. Do you have any artifical heart valves or other implanted medical devices like a pacemaker, defibrillator, or continuous glucose monitor? No   16. Do you have artificial joints? No   17. Are you allergic to latex? YES:      Preoperative Review of :   reviewed - no record of controlled substances prescribed.        Review of Systems  CONSTITUTIONAL: NEGATIVE for fever, chills, change in weight  ENT/MOUTH: NEGATIVE for ear, mouth and throat problems  RESP: NEGATIVE for significant cough or SOB  CV: NEGATIVE for chest pain, palpitations or peripheral edema    Patient Active Problem List    Diagnosis Date Noted     Malignant neoplasm of upper lobe of left lung (H) 04/18/2022     Priority: Medium     Tobacco dependence      Priority: Medium     Closed fracture of distal end of right fibula with routine healing, unspecified fracture morphology, subsequent encounter 04/21/2021     Priority: Medium     -Injury on 12/24/20  -Seen on 1/5/21 -> continue CAM boot, repeat XR in 4 weeks  -Seen on 1/18/21 -> needed unemployment paperwork filled out  -[facility] 2/1/21 -> XR showed healing well -> wean off CAM boot, PT, repeat XR in 6 weeks  -Seen on 2/16/21 -> out of CAM boot, start PT  -Seen on 3/30/21 -> graduated from PT -> ordered XR to confirmed healed  -Seen on 4/15/21 -> XR reviewed (incomplete healing); patient okay for ambulation and work; wrote note stating results of XR for patient       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 Saw Dr. Tomas ([facility]) on 8/17 who confirmed mixed conductive and sensineural hearing loss worse in left. Recommended hearing aids with possible meatoplasty. Will discuss on 3 month Follow-up.       H/O colonoscopy 08/17/2018     Priority: Medium     Colonoscopy August 2018, normal, recommended 10 years until colonoscopy        HLD (hyperlipidemia) 08/10/2018     Priority: Medium     Pulmonary emphysema, unspecified emphysema type (H) 01/10/2018     Priority: Medium     Pulmonary nodules 12/06/2017     Priority: Medium     Unchanged on 07/19 low dose CT scan. Recommend annual screening with low dose CT.       Benign prostatic hyperplasia with urinary frequency 12/06/2017     Priority: Medium     Tobacco use 07/14/2017     Priority: Medium      Past Medical History:   Diagnosis Date     Pulmonary nodules      Tobacco dependence      Past Surgical History:   Procedure Laterality Date     COLONOSCOPY N/A 8/16/2018    Procedure: COLONOSCOPY;  Surgeon: Alex Roche III, MD;  Location: [facility];  Service:      OTHER SURGICAL HISTORY      double hernia     OTHER SURGICAL HISTORY      hand (right)     Current Outpatient Medications   Medication Sig Dispense Refill     aspirin (ASA) 81 MG EC tablet Take 1 tablet (81 mg) by mouth daily (Patient not taking: Reported on 4/25/2022) 90 tablet 3     atorvastatin (LIPITOR) 40 MG tablet Take 1 tablet (40 mg) by mouth daily (Patient not taking: No sig reported)       calcium carbonate-vitamin D (OS-TAM) 600-400 MG-UNIT chewable tablet Take 1 chew tab by mouth 2 times daily (Patient not taking: Reported on 4/25/2022) 180 tablet 1     ipratropium - albuterol 0.5 mg/2.5 mg/3 mL (DUONEB) 0.5-2.5 (3) MG/3ML neb solution Take 1 vial (3 mLs) by nebulization every 6 hours as needed for shortness of breath / dyspnea or wheezing (Patient not taking: Reported on 4/25/2022) 30 mL 3       Allergies   Allergen Reactions     Latex Rash     Penicillins Rash     rash     "    Social History     Tobacco Use     Smoking status: Current Every Day Smoker     Packs/day: 1.00     Years: 52.00     Pack years: 52.00     Types: Cigarettes     Smokeless tobacco: Never Used     Tobacco comment: .25 pack currently   Substance Use Topics     Alcohol use: No     Family History   Problem Relation Age of Onset     Diabetes Mother      Heart Disease Mother      Hypertension No family hx of      Coronary Artery Disease No family hx of      Cancer No family hx of      History   Drug Use     Frequency: 2.0 times per week     Types: Marijuana         Objective     BP 99/64   Pulse 64   Temp 97.9  F (36.6  C)   Resp 18   Ht 1.59 m (5' 2.6\")   Wt 51.3 kg (113 lb)   SpO2 98%   BMI 20.27 kg/m      Physical Exam  GENERAL APPEARANCE: healthy, alert and no distress  HENT: ear canals and TM's normal and nose and mouth without ulcers or lesions  RESP: lungs clear to auscultation - no rales, rhonchi or wheezes  CV: regular rate and rhythm, normal S1 S2, no S3 or S4 and no murmur, click or rub   ABDOMEN: soft, nontender, no HSM or masses and bowel sounds normal  NEURO: Normal strength and tone, sensory exam grossly normal, mentation intact and speech normal    Recent Labs   Lab Test 04/22/22  0848 02/17/22  1108 05/20/21  1145 10/23/20  1053   HGB 12.5 13.0*  --  13.9   PLT  --  294  --   --    NA  --  139  --  140.0   POTASSIUM  --  4.4  --  4.5   CR  --  0.86  --  0.8   A1C  --   --  5.8*  --         Diagnostics:  Covid test: pending  No EKG required for low risk surgery (cataract, skin procedure, breast biopsy, etc).    Revised Cardiac Risk Index (RCRI):  The patient has the following serious cardiovascular risks for perioperative complications:   - No serious cardiac risks = 0 points     RCRI Interpretation: 0 points: Class I (very low risk - 0.4% complication rate)           Signed Electronically by: Davian Ireland MD  Copy of this evaluation report is provided to requesting " physician.    Precepted patient with Dr. Rosenstein

## 2022-05-03 ENCOUNTER — HOSPITAL ENCOUNTER (OUTPATIENT)
Dept: CT IMAGING | Facility: HOSPITAL | Age: 67
Discharge: HOME | End: 2022-05-03
Attending: RADIOLOGY
Payer: COMMERCIAL

## 2022-05-03 ENCOUNTER — HOSPITAL ENCOUNTER (OUTPATIENT)
Dept: RADIOLOGY | Facility: HOSPITAL | Age: 67
Discharge: HOME | End: 2022-05-03
Attending: RADIOLOGY
Payer: COMMERCIAL

## 2022-05-03 VITALS
OXYGEN SATURATION: 100 % | TEMPERATURE: 97.4 F | RESPIRATION RATE: 16 BRPM | SYSTOLIC BLOOD PRESSURE: 115 MMHG | DIASTOLIC BLOOD PRESSURE: 70 MMHG | HEART RATE: 51 BPM

## 2022-05-03 DIAGNOSIS — R91.8 PULMONARY NODULES: ICD-10-CM

## 2022-05-03 DIAGNOSIS — C34.12 MALIGNANT NEOPLASM OF UPPER LOBE OF LEFT LUNG (H): ICD-10-CM

## 2022-05-03 LAB
INR PPP: 1.1 (ref 0.85–1.15)
PLATELET # BLD AUTO: 309 10E3/UL (ref 150–450)

## 2022-05-03 PROCEDURE — 77012 CT SCAN FOR NEEDLE BIOPSY: CPT

## 2022-05-03 PROCEDURE — 36415 COLL VENOUS BLD VENIPUNCTURE: CPT | Performed by: RADIOLOGY

## 2022-05-03 PROCEDURE — 32553 INS MARK THOR FOR RT PERQ: CPT

## 2022-05-03 PROCEDURE — 250N000011 HC RX IP 250 OP 636: Performed by: RADIOLOGY

## 2022-05-03 PROCEDURE — 88305 TISSUE EXAM BY PATHOLOGIST: CPT | Mod: TC | Performed by: RADIOLOGY

## 2022-05-03 PROCEDURE — 85610 PROTHROMBIN TIME: CPT | Mod: GZ | Performed by: RADIOLOGY

## 2022-05-03 PROCEDURE — 85049 AUTOMATED PLATELET COUNT: CPT | Performed by: RADIOLOGY

## 2022-05-03 PROCEDURE — 71045 X-RAY EXAM CHEST 1 VIEW: CPT

## 2022-05-03 PROCEDURE — 88333 PATH CONSLTJ SURG CYTO XM 1: CPT | Mod: TC | Performed by: RADIOLOGY

## 2022-05-03 PROCEDURE — 272N000221 CT LUNG MEDIASTINUM BIOPSY

## 2022-05-03 RX ORDER — NALOXONE HYDROCHLORIDE 0.4 MG/ML
0.2 INJECTION, SOLUTION INTRAMUSCULAR; INTRAVENOUS; SUBCUTANEOUS
Status: DISCONTINUED | OUTPATIENT
Start: 2022-05-03 | End: 2022-05-04 | Stop reason: HOSPADM

## 2022-05-03 RX ORDER — FENTANYL CITRATE 50 UG/ML
25-50 INJECTION, SOLUTION INTRAMUSCULAR; INTRAVENOUS EVERY 5 MIN PRN
Status: DISCONTINUED | OUTPATIENT
Start: 2022-05-03 | End: 2022-05-04 | Stop reason: HOSPADM

## 2022-05-03 RX ORDER — NALOXONE HYDROCHLORIDE 0.4 MG/ML
0.4 INJECTION, SOLUTION INTRAMUSCULAR; INTRAVENOUS; SUBCUTANEOUS
Status: DISCONTINUED | OUTPATIENT
Start: 2022-05-03 | End: 2022-05-04 | Stop reason: HOSPADM

## 2022-05-03 RX ORDER — FLUMAZENIL 0.1 MG/ML
0.2 INJECTION, SOLUTION INTRAVENOUS
Status: DISCONTINUED | OUTPATIENT
Start: 2022-05-03 | End: 2022-05-04 | Stop reason: HOSPADM

## 2022-05-03 RX ADMIN — MIDAZOLAM 0.5 MG: 1 INJECTION INTRAMUSCULAR; INTRAVENOUS at 09:00

## 2022-05-03 RX ADMIN — MIDAZOLAM 1 MG: 1 INJECTION INTRAMUSCULAR; INTRAVENOUS at 08:45

## 2022-05-03 RX ADMIN — FENTANYL CITRATE 25 MCG: 50 INJECTION, SOLUTION INTRAMUSCULAR; INTRAVENOUS at 09:02

## 2022-05-03 RX ADMIN — FENTANYL CITRATE 50 MCG: 50 INJECTION, SOLUTION INTRAMUSCULAR; INTRAVENOUS at 08:47

## 2022-05-03 NOTE — DISCHARGE INSTRUCTIONS
LUNG BIOPSY    1. You are required to have someone accompany you home. Do not drive or operate machinery today as the medication may cause sleepiness.    2. Rest today and avoid strenuous activity or heavy lifting for 48 hours. Over-activity may produce dizziness and or nausea.    3. You should follow your normal diet. Drink plenty of fluids. No alcoholic beverages for 24 hours. *(Alcohol may interact with the medications you received today)    4. Leave bandage on today, you may remove tomorrow.    5. You may shower tomorrow. Do not soak in a bath tub, hot tub, or swim until the site is completely healed and the skin glue is off. Keep the site clean and dry.    6. Watch your biopsy site for signs of infection, increase pain, redness, swelling, or any drainage and or fever or chills.    7. If you experience sudden weakness, dizziness, shortness of breath, a temperature above 100.0 degree F for more than 24 hours call your doctor or go to the emergency room.        Discharge Instructions Needle Biopsy: Lung    You had a needle biopsy of one of your lungs. In this procedure, a hollow needle is used to take one or more samples of your lung tissue. The tissue is then examined under a microscope. There are several different types of needle biopsies. Two types are:   Fine needle aspiration. A small amount of tissue is withdrawn (aspirated) using a very fine needle.  Core biopsy. A larger tissue sample is removed for examination.  A biopsy needle is inserted through your skin into your chest and lung. This is called a transthoracic approach, which means across or through the chest (thorax). Scans are done at the same time so that your provider can find the area where he or she would like to sample tissue. Needle biopsies don't require cuts or incisions into the body like open biopsies.     Your healthcare provider will use the results of your biopsy to help diagnose your condition.     Home care  The site of the biopsy may  feel numb for a while if you got numbing medicine.  You might have a little soreness after the needle biopsy.  Follow your healthcare provider's instructions about removing bandages and showering or bathing.  You may be sleepy after the biopsy if you got medicine to help you relax (sedation). Don't drive until the next day or as instructed by your healthcare provider.  Don't do heavy lifting, a lot of stair climbing, vigorous exercise, or take part in sports the day of your biopsy. You can get back to your regular activities as instructed by your healthcare provider.     Follow-up care  Follow up with your healthcare provider, or as advised. Be sure you make an appointment with your healthcare provider to discuss the biopsy results.     When to call your healthcare provider  Call your healthcare provider right away if any of these occur:   Infection. You might have redness, pain, swelling, or drainage at the site of your biopsies.  Bleeding. You might also have bleeding at the site of your biopsies.  Coughing up blood. This may only be a small amount.    Call 911  Call 911 right away if any of these occur:   Collapsed lung (pneumothorax). This means that air from your lungs leaks out into the spaces between your lungs and chest wall. It can lead to feeling short of breath, pain with breathing, trouble breathing, and a collapsed lung.  Fast pulse  Sharp pains in your chest or shoulder  Fingernails, lips, or skin turn blue, purple, or gray  Trouble walking or talking  Feeling faint or dizzy    [employer] last reviewed this educational content on 10/1/2019    1712-3765 [employer]. All rights reserved. This information is not intended as a substitute for professional medical care. Always follow your healthcare professional's instructions.

## 2022-05-03 NOTE — PROCEDURES
"[facility]    Procedure: Computed tomography guided left lung biopsy and fiducial placement with moderate sedation    Date/Time: 5/3/2022 9:32 AM  Performed by: Jose L Farley MD  Authorized by: Jose L Farley MD       UNIVERSAL PROTOCOL   Site Marked: Yes  Prior Images Obtained and Reviewed:  Yes  Required items: Required blood products, implants, devices and special equipment available    Patient identity confirmed:  Verbally with patient and arm band  Patient was reevaluated immediately before administering moderate or deep sedation or anesthesia  Confirmation Checklist:  Patient's identity using two indicators, relevant allergies, procedure was appropriate and matched the consent or emergent situation and correct equipment/implants were available  Time out: Immediately prior to the procedure a time out was called    Universal Protocol: the Joint Commission Universal Protocol was followed    Preparation: Patient was prepped and draped in usual sterile fashion    ESBL (mL):  0     ANESTHESIA    Anesthesia: Local infiltration  Local Anesthetic:  Lidocaine 1% without epinephrine  Anesthetic Total (mL):  10      SEDATION  Patient Sedated: Yes    Sedation Type:  Moderate (conscious) sedation  Sedation:  Fentanyl, midazolam and see MAR for details  Vital signs: Vital signs monitored during sedation    See dictated procedure note for full details.    PROCEDURE  Describe Procedure: 1 20 gauge core with Temno coaxial 20 gauge biopsy needle. Preliminary pathology \"positive for malignant cells\" Very small pneumothorax so fiducial placed without obtaining further biopsy samples.Post procedure CT images show very small left pneumothorax. Patient is asymptomatic from the pneumothorax. Fiducial migrated approximately 8 mm lateral to lesion along anterior lung margin.  Patient complications:  Other (see comment)  Length of time physician/provider present for 1:1 monitoring during " sedation: 30   Very small post biopsy left pneumothorax. Patient currently asymptomatic. Will check chest x ray 1 hour after procedure.

## 2022-05-03 NOTE — IP AVS SNAPSHOT
[facility]  [address] [facility] [address]  Phone: [phone]  Fax: [phone]                                      After Visit Summary   5/3/2022    Zafar Spencer   MRN: [id number]           After Visit Summary Signature Page    I have received my discharge instructions, and my questions have been answered. I have discussed any challenges I see with this plan with the nurse or doctor.    ..........................................................................................................................................  Patient/Patient Representative Signature      ..........................................................................................................................................  Patient Representative Print Name and Relationship to Patient    ..................................................               ................................................  Date                                   Time    ..........................................................................................................................................  Reviewed by Signature/Title    ...................................................              ..............................................  Date                                               Time          22EPIC Rev 08/18

## 2022-05-04 PROCEDURE — 88360 TUMOR IMMUNOHISTOCHEM/MANUAL: CPT | Mod: 26 | Performed by: PATHOLOGY

## 2022-05-04 PROCEDURE — 88333 PATH CONSLTJ SURG CYTO XM 1: CPT | Mod: 26 | Performed by: PATHOLOGY

## 2022-05-04 PROCEDURE — 88305 TISSUE EXAM BY PATHOLOGIST: CPT | Mod: 26 | Performed by: PATHOLOGY

## 2022-05-04 PROCEDURE — 88341 IMHCHEM/IMCYTCHM EA ADD ANTB: CPT | Mod: 26 | Performed by: PATHOLOGY

## 2022-05-04 PROCEDURE — 88342 IMHCHEM/IMCYTCHM 1ST ANTB: CPT | Mod: 26 | Performed by: PATHOLOGY

## 2022-05-06 LAB
PATH REPORT.ADDENDUM SPEC: ABNORMAL
PATH REPORT.COMMENTS IMP SPEC: ABNORMAL
PATH REPORT.COMMENTS IMP SPEC: YES
PATH REPORT.FINAL DX SPEC: ABNORMAL
PATH REPORT.GROSS SPEC: ABNORMAL
PATH REPORT.MICROSCOPIC SPEC OTHER STN: ABNORMAL
PATH REPORT.RELEVANT HX SPEC: ABNORMAL

## 2022-05-09 ENCOUNTER — TELEPHONE (OUTPATIENT)
Dept: RADIATION ONCOLOGY | Facility: HOSPITAL | Age: 67
End: 2022-05-09
Payer: COMMERCIAL

## 2022-05-09 NOTE — TELEPHONE ENCOUNTER
Called patient today regarding his CT biopsy result.  The pathology confirmed non-small cell lung cancer and patient is scheduled to return to radiation oncology 5/12/2022 for simulation.  All questions are answered to patient's satisfaction.

## 2022-05-12 ENCOUNTER — ALLIED HEALTH/NURSE VISIT (OUTPATIENT)
Dept: RADIATION ONCOLOGY | Facility: HOSPITAL | Age: 67
End: 2022-05-12
Attending: RADIOLOGY
Payer: COMMERCIAL

## 2022-05-12 DIAGNOSIS — C34.12 MALIGNANT NEOPLASM OF UPPER LOBE OF LEFT LUNG (H): Primary | ICD-10-CM

## 2022-05-12 PROCEDURE — 77334 RADIATION TREATMENT AID(S): CPT | Performed by: RADIOLOGY

## 2022-05-12 PROCEDURE — 77334 RADIATION TREATMENT AID(S): CPT | Mod: 26 | Performed by: RADIOLOGY

## 2022-05-12 NOTE — PROCEDURES
SBRT Special Treatment Procedure Note  Date of Service: 5/12/2022    Diagnosis:  Non-small cell lung cancer, stage T1N0M0    Medical Indication:  To escalate the radiotherapy dose to a curative dose (5000 cGy) using stereotactic body radiotherapy technique and to spare surrounding lung and normal tissues from excessive toxicity.    SBRT planning was completed today to prepare for lung cancer treatment.  SBRT planning is chosen to avoid the critical structures, which cannot be done adequately with conventional planning due to the dose constraints of the normal surrounding critical organs.  In addition, the treatment will be high dose per fraction with complete course to be done in 5 sessions. The patient previously had CT scan acquisition for planning and special treatment aids used include.  The patient was simulated in a supine position. After the contouring of the GTV, ITV (MIPS 90%) and MIPS Averages, a clinical tumor volume (CTV), expanded volume of planned treatment volume (PTV) was carried out on the treatment planning system.  Critical structures outlined included both right and left lung, brachial plexus, spinal cord, esophagus, and airway. Extra efforts were required to immobilize the patient using the custom designed stereotactic frame as well as planning.  Extra efforts during the planning process included contouring of critical structures, GTV, ITV, CTV, PTV and evaluating the DVH and coverage of the PTV.    The patient is going to have SBRT technique for his lung cancer. I have explained to the patient this is a very complicated process which will require an extensive amount of time for planning, delivering and monitoring the patient. The patient understands well and wished to proceed.       Janessa Yanes MD, PhD  Department of Radiation Oncology   [facility] Radiation Oncology  Tel: [phone]  Page: [phone]    [id number] [place] [address]  [place] [id number] [facility] [place] [address]

## 2022-05-12 NOTE — PROCEDURES
SIMULATION NOTE:     DIAGNOSIS: Adenocarcinoma of left upper lobe with clinical stage T1 N0 M0.  The patient is not a good candidate for surgery given his poor medical status.     INDICATION: After discussion with patient about various treatment options, the patient elected to proceed with definitive radiation therapy using SBRT technique for his lung cancer. The patient is here for simulation.     CONSENT: The possible risks and side effects of radiation therapy have been discussed with the patient in detail and at a great length. The patient's questions are answered to patient's satisfaction. Written consent was obtained.     SIMULATION: Patient is in supine position with SBRT frame and VacLok to help keep the same position during the radiation therapy. Tentative isocenter is set in the center of thoracic region. We will acquire 4D scan to help us better locate target and design radiation therapy field. We are also going to use the respiratory gating technique to help us to better locate the movement of the tumor.     BLOCKS: Custom blocks will be drawn to minimize radiation to normal tissues and to protect normal organs including, but not limited to, spinal cord, brachial plexus, lungs, bronchial tree, esophagus, liver, heart/large vessels, spleen, stomach, small bowel, diaphragm, bone and soft tissue.     DOSAGE: I plan to give him radiation therapy at 1000 cGy each fraction to a total of 5000 cGy in 5 fractions over 2 weeks. I will consider using SBRT/IGRT technique to help us to better locate the target and to protect normal tissue.       Janessa Yanes MD, PhD  Department of Radiation Oncology   [facility] Radiation Oncology  Tel: [phone]  Page: [phone]    [facility]  [address] [place] [address]     [address]  [place] [address]    Addendum:    Upon evaluation of patient's simulation CT.  The markers placed earlier has been migrated.  We will schedule  patient to have fiducial placed again before proceed with radiation therapy.  This has been explained to the patient and he understands well and wished to proceed.

## 2022-05-12 NOTE — PROCEDURES
Clinical Treatment Planning Note    The complex radiotherapy planning will be completed for his lung cancer. The patient had a planning CT earlier today for planning. The treatment aids were used for planning, including headrest and SBRT Board to help keep the same position during the daily radiation therapy. The therapy planning is necessary to reduce radiotherapy dose to the normal critical organs which are not possible with simple treatment. In addition, dose to the target and the critical structures requires three-dimensional analysis of the isodose distribution. The planning will be done to reduce dose to the spinal cord, lungs, bronchial tree, esophagus, liver, heart/large vessels, spleen, stomach, small bowel, diaphragm, bone and soft tissue.   I will contour the clinical tumor volume  CTV , with expanded volume of planning treatment volume  PTV  on the treatment planning system. The critical structures will be outlined, including spinal cord, brachial plexus, lungs, bronchial tree, esophagus, liver, heart/large vessels, spleen, stomach, small bowel, diaphragm, bone and soft tissue.     Treatment planning will be done on the computer treatment planning system. The multiple fields will be used to achieve optimal coverage of the target volume. Dose distribution to the above critical structures will be reviewed. Isodose distribution along with the X, Y, Z plan will also be reviewed. Custom blocking will be used to shield normal structures. The beam s eye views will be reviewed and the digital reconstructed image will be reviewed on the planning software. The patient will receive 5000 cGy in 5 treatments targeted to the lung tumors using 6 MV photons with SBRT/IMRT/IGRT.      Janessa Yanes MD, PhD  Department of Radiation Oncology   [facility] Radiation Oncology  Tel: [phone]  Page: [phone]    [facility]  [address] [place], [place] [address] [place] [address] [place] [address]

## 2022-05-17 ENCOUNTER — TELEPHONE (OUTPATIENT)
Dept: FAMILY MEDICINE | Facility: CLINIC | Age: 67
End: 2022-05-17
Payer: COMMERCIAL

## 2022-05-17 NOTE — TELEPHONE ENCOUNTER
"[facility] phone call message- general phone call:    Reason for call:     Patient wife call and was telling me her name and then the  name and I asked if this is regarding to her or yovani. \" Im talking about my better half, yovani. I have the right to make appointment  And to speak about him regarding a Covid test. Is there a problem, I can speak to the supervisor and I can hold.\" I advised supervisor is in a meeting. \" I don't think you are understanding me, I am trying to make a Covid test appointment, if you cant do it then never mind\" Patient spouse hung up.       Return call needed: No    OK to leave a message on voice mail? No    Primary language: English      needed? No    Call taken on May 17, 2022 at 1:15 PM by Sejal Solomon    "

## 2022-05-17 NOTE — TELEPHONE ENCOUNTER
Upon further review of patient's chart, looks like spouse called back after initial clinic scheduling contact and has already scheduled patient for tomorrow for covid pcr testing prior to radiology appointment. No further action is needed. Mandy PERKINS

## 2022-05-18 ENCOUNTER — LAB (OUTPATIENT)
Dept: FAMILY MEDICINE | Facility: CLINIC | Age: 67
End: 2022-05-18
Payer: COMMERCIAL

## 2022-05-18 DIAGNOSIS — Z11.59 ENCOUNTER FOR SCREENING FOR OTHER VIRAL DISEASES: ICD-10-CM

## 2022-05-26 NOTE — IR NOTE
IR Procedure Pre-call    Spoke with: Pts wife  Arrival and procedure time: 0930/1030  Patient has : Yes  Patient is taking blood thinners:No   Patient has H&P within 30 days:Yes   Patient has covid test:Yes   Patient NPO 8 hours prior: Yes    Pre-call completed.   May 26, 2022 2:31 PM  Jania Tovar RN

## 2022-05-31 ENCOUNTER — LAB (OUTPATIENT)
Dept: FAMILY MEDICINE | Facility: CLINIC | Age: 67
End: 2022-05-31
Payer: COMMERCIAL

## 2022-05-31 DIAGNOSIS — Z01.812 ENCOUNTER FOR PREPROCEDURE SCREENING LABORATORY TESTING FOR COVID-19: ICD-10-CM

## 2022-05-31 DIAGNOSIS — Z11.52 ENCOUNTER FOR PREPROCEDURE SCREENING LABORATORY TESTING FOR COVID-19: ICD-10-CM

## 2022-05-31 PROCEDURE — U0003 INFECTIOUS AGENT DETECTION BY NUCLEIC ACID (DNA OR RNA); SEVERE ACUTE RESPIRATORY SYNDROME CORONAVIRUS 2 (SARS-COV-2) (CORONAVIRUS DISEASE [COVID-19]), AMPLIFIED PROBE TECHNIQUE, MAKING USE OF HIGH THROUGHPUT TECHNOLOGIES AS DESCRIBED BY CMS-2020-01-R: HCPCS

## 2022-05-31 PROCEDURE — U0005 INFEC AGEN DETEC AMPLI PROBE: HCPCS

## 2022-06-01 LAB — SARS-COV-2 RNA RESP QL NAA+PROBE: NEGATIVE

## 2022-06-02 ENCOUNTER — HOSPITAL ENCOUNTER (OUTPATIENT)
Dept: RADIOLOGY | Facility: HOSPITAL | Age: 67
Discharge: HOME | End: 2022-06-02
Attending: RADIOLOGY
Payer: COMMERCIAL

## 2022-06-02 ENCOUNTER — HOSPITAL ENCOUNTER (OUTPATIENT)
Dept: CT IMAGING | Facility: HOSPITAL | Age: 67
Discharge: HOME | End: 2022-06-02
Attending: RADIOLOGY
Payer: COMMERCIAL

## 2022-06-02 VITALS
WEIGHT: 113 LBS | DIASTOLIC BLOOD PRESSURE: 69 MMHG | BODY MASS INDEX: 20.02 KG/M2 | HEART RATE: 55 BPM | OXYGEN SATURATION: 99 % | HEIGHT: 63 IN | TEMPERATURE: 97.7 F | SYSTOLIC BLOOD PRESSURE: 123 MMHG | RESPIRATION RATE: 12 BRPM

## 2022-06-02 DIAGNOSIS — C34.12 MALIGNANT NEOPLASM OF UPPER LOBE OF LEFT LUNG (H): ICD-10-CM

## 2022-06-02 LAB
HGB BLD-MCNC: 13.2 G/DL (ref 13.3–17.7)
INR PPP: 1.11 (ref 0.85–1.15)
PLATELET # BLD AUTO: 298 10E3/UL (ref 150–450)

## 2022-06-02 PROCEDURE — 85049 AUTOMATED PLATELET COUNT: CPT | Performed by: RADIOLOGY

## 2022-06-02 PROCEDURE — 85018 HEMOGLOBIN: CPT | Performed by: RADIOLOGY

## 2022-06-02 PROCEDURE — 250N000011 HC RX IP 250 OP 636: Performed by: RADIOLOGY

## 2022-06-02 PROCEDURE — A4648 IMPLANTABLE TISSUE MARKER: HCPCS

## 2022-06-02 PROCEDURE — 32553 INS MARK THOR FOR RT PERQ: CPT

## 2022-06-02 PROCEDURE — 36415 COLL VENOUS BLD VENIPUNCTURE: CPT | Performed by: RADIOLOGY

## 2022-06-02 PROCEDURE — 85610 PROTHROMBIN TIME: CPT | Performed by: RADIOLOGY

## 2022-06-02 PROCEDURE — 71046 X-RAY EXAM CHEST 2 VIEWS: CPT

## 2022-06-02 RX ORDER — NALOXONE HYDROCHLORIDE 0.4 MG/ML
0.4 INJECTION, SOLUTION INTRAMUSCULAR; INTRAVENOUS; SUBCUTANEOUS
Status: DISCONTINUED | OUTPATIENT
Start: 2022-06-02 | End: 2022-06-03 | Stop reason: HOSPADM

## 2022-06-02 RX ORDER — FLUMAZENIL 0.1 MG/ML
0.2 INJECTION, SOLUTION INTRAVENOUS
Status: DISCONTINUED | OUTPATIENT
Start: 2022-06-02 | End: 2022-06-03 | Stop reason: HOSPADM

## 2022-06-02 RX ORDER — ACETAMINOPHEN 325 MG/1
650 TABLET ORAL EVERY 4 HOURS PRN
Status: DISCONTINUED | OUTPATIENT
Start: 2022-06-02 | End: 2022-06-03 | Stop reason: HOSPADM

## 2022-06-02 RX ORDER — FENTANYL CITRATE 50 UG/ML
25-50 INJECTION, SOLUTION INTRAMUSCULAR; INTRAVENOUS EVERY 5 MIN PRN
Status: DISCONTINUED | OUTPATIENT
Start: 2022-06-02 | End: 2022-06-03 | Stop reason: HOSPADM

## 2022-06-02 RX ORDER — NALOXONE HYDROCHLORIDE 0.4 MG/ML
0.2 INJECTION, SOLUTION INTRAMUSCULAR; INTRAVENOUS; SUBCUTANEOUS
Status: DISCONTINUED | OUTPATIENT
Start: 2022-06-02 | End: 2022-06-03 | Stop reason: HOSPADM

## 2022-06-02 RX ADMIN — MIDAZOLAM HYDROCHLORIDE 1 MG: 1 INJECTION, SOLUTION INTRAMUSCULAR; INTRAVENOUS at 09:59

## 2022-06-02 RX ADMIN — FENTANYL CITRATE 50 MCG: 50 INJECTION INTRAMUSCULAR; INTRAVENOUS at 10:01

## 2022-06-02 NOTE — IP AVS SNAPSHOT
[facility] [place]  [address] [address]  Phone: [phone]  Fax: [phone]                                      After Visit Summary   6/2/2022    Zafar Spencer   MRN: [id number]           After Visit Summary Signature Page    I have received my discharge instructions, and my questions have been answered. I have discussed any challenges I see with this plan with the nurse or doctor.    ..........................................................................................................................................  Patient/Patient Representative Signature      ..........................................................................................................................................  Patient Representative Print Name and Relationship to Patient    ..................................................               ................................................  Date                                   Time    ..........................................................................................................................................  Reviewed by Signature/Title    ...................................................              ..............................................  Date                                               Time          22EPIC Rev 08/18

## 2022-06-02 NOTE — PRE-PROCEDURE
GENERAL PRE-PROCEDURE:   Date/Time:  6/2/2022 9:35 AM    Verbal consent obtained?: Yes    Risks and benefits: Risks, benefits and alternatives were discussed    DC Plan: Appropriate discharge home plan in place for patients who are going home after procedure   Consent given by:  Patient  Patient states understanding of procedure being performed: Yes    Patient's understanding of procedure matches consent: Yes    Procedure consent matches procedure scheduled: Yes    Expected level of sedation:  Moderate  Appropriately NPO:  Yes  ASA Class:  2  Mallampati  :  Grade 2- soft palate, base of uvula, tonsillar pillars, and portion of posterior pharyngeal wall visible  Lungs:  Lungs clear with good breath sounds bilaterally  Heart:  Normal heart sounds and rate  History & Physical reviewed:  History and physical reviewed and no updates needed  Statement of review:  I have reviewed the lab findings, diagnostic data, medications, and the plan for sedation

## 2022-06-02 NOTE — LETTER
[facility]  [address] [facility] [address] [facility] [address]  [phone]    2022    Re: Zafar Spencer  [address]  [address]  [facility] [address]  [phone] (home)     : 1955      To Whom It May Concern:      Zafar Spencer was in procedure 2022.  He may return to work on 22 with full duty.        Sincerely,        Matt Crouch RN

## 2022-06-02 NOTE — PROCEDURES
[facility]    Procedure: Imaging Procedure Note    Date/Time: 6/2/2022 10:25 AM  Performed by: Jey Patricia MD  Authorized by: Jey Patricia MD       UNIVERSAL PROTOCOL   Site Marked: Yes  Prior Images Obtained and Reviewed:  Yes  Required items: Required blood products, implants, devices and special equipment available    Patient identity confirmed:  Verbally with patient  Patient was reevaluated immediately before administering moderate or deep sedation or anesthesia  Confirmation Checklist:  Patient's identity using two indicators, relevant allergies, procedure was appropriate and matched the consent or emergent situation and correct equipment/implants were available  Time out: Immediately prior to the procedure a time out was called    Universal Protocol: the Joint Commission Universal Protocol was followed    Preparation: Patient was prepped and draped in usual sterile fashion       ANESTHESIA    Local Anesthetic: Lidocaine 1% without epinephrine  Anesthetic Total (mL):  10      SEDATION  Patient Sedated: Yes    Sedation:  Fentanyl, midazolam and see MAR for details  Vital signs: Vital signs monitored during sedation    See dictated procedure note for full details.    PROCEDURE  Describe Procedure: CT guided fiducial placement RICHARD lung mass. Blood patch performed at needle tract.    No complications.  Patient Tolerance:  Patient tolerated the procedure well with no immediate complications  Length of time physician/provider present for 1:1 monitoring during sedation: 30

## 2022-06-02 NOTE — DISCHARGE INSTRUCTIONS
LUNG BIOPSY    1. You are required to have someone accompany you home. Do not drive or operate machinery today as the medication may cause sleepiness.    2. Rest today and avoid strenuous activity or heavy lifting for 48 hours. Over-activity may produce dizziness and or nausea.    3. You should follow your normal diet. Drink plenty of fluids. No alcoholic beverages for 24 hours. *(Alcohol may interact with the medications you received today)    4. Leave bandage on today, you may remove tomorrow.    5. You may shower tomorrow. Do not soak in a bath tub, hot tub, or swim until the site is completely healed and the skin glue is off. Keep the site clean and dry.    6. Watch your biopsy site for signs of infection, increase pain, redness, swelling, or any drainage and or fever or chills.    7. If you experience sudden weakness, dizziness, shortness of breath, a temperature above 100.0 degree F for more than 24 hours call your doctor or go to the emergency room.        You had a Left lung fiducial marker placed today.    Home care  The site of the puncture may feel numb for a while if you got numbing medicine.  You might have a little soreness after the fiducial placement.  Follow your healthcare provider's instructions about removing bandages and showering or bathing.  You may be sleepy after the fiducial placement if you got medicine to help you relax (sedation). Don't drive until the next day or as instructed by your healthcare provider.  Don't do heavy lifting, a lot of stair climbing, vigorous exercise, or take part in sports the day of your biopsy. You can get back to your regular activities as instructed by your healthcare provider.     Follow-up care  Follow up with your healthcare provider, or as advised. Be sure you make an appointment with your healthcare provider to discuss the biopsy results.     When to call your healthcare provider  Call your healthcare provider right away if any of these occur:    Infection. You might have redness, pain, swelling, or drainage at the site of your biopsies.  Bleeding. You might also have bleeding at the site of your biopsies.  Coughing up blood. This may only be a small amount.    Call 911  Call 911 right away if any of these occur:   Collapsed lung (pneumothorax). This means that air from your lungs leaks out into the spaces between your lungs and chest wall. It can lead to feeling short of breath, pain with breathing, trouble breathing, and a collapsed lung.  Fast pulse  Sharp pains in your chest or shoulder  Fingernails, lips, or skin turn blue, purple, or gray  Trouble walking or talking  Feeling faint or dizzy    [employer] last reviewed this educational content on 10/1/2019    1625-0191 [employer]. All rights reserved. This information is not intended as a substitute for professional medical care. Always follow your healthcare professional's instructions.

## 2022-06-09 ENCOUNTER — ALLIED HEALTH/NURSE VISIT (OUTPATIENT)
Dept: RADIATION ONCOLOGY | Facility: HOSPITAL | Age: 67
End: 2022-06-09
Attending: RADIOLOGY
Payer: COMMERCIAL

## 2022-06-09 PROCEDURE — 77334 RADIATION TREATMENT AID(S): CPT | Mod: 26 | Performed by: RADIOLOGY

## 2022-06-09 PROCEDURE — 77334 RADIATION TREATMENT AID(S): CPT | Performed by: RADIOLOGY

## 2022-06-09 NOTE — PROCEDURES
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  The patient is not a good candidate for surgery given his poor medical status.      INDICATION: After discussion with patient about various treatment options, the patient elected to proceed with definitive radiation therapy using SBRT technique for his lung cancer.    The patient was initially simulated on 5/12/2022.  Upon evaluation of patient's simulation CT.  The markers placed earlier has been migrated.    He had a second procedure for fiducial placed again on 6/2/2022.  The patient is here for simulation.      CONSENT: The possible risks and side effects of radiation therapy have been discussed with the patient in detail and at a great length. The patient's questions are answered to patient's satisfaction. Written consent was obtained.      SIMULATION: Patient is in supine position with SBRT frame and VacLok to help keep the same position during the radiation therapy. Tentative isocenter is set in the center of thoracic region. We will acquire 4D scan to help us better locate target and design radiation therapy field. We are also going to use the respiratory gating technique to help us to better locate the movement of the tumor.      BLOCKS: Custom blocks will be drawn to minimize radiation to normal tissues and to protect normal organs including, but not limited to, spinal cord, brachial plexus, lungs, bronchial tree, esophagus, liver, heart/large vessels, spleen, stomach, small bowel, diaphragm, bone and soft tissue.      DOSAGE: I plan to give him radiation therapy at 1000 cGy each fraction to a total of 5000 cGy in 5 fractions over 2 weeks. I will consider using SBRT/IGRT technique to help us to better locate the target and to protect normal tissue.         Janessa Yanes MD, PhD  Department of Radiation Oncology   [facility] Radiation Oncology  Tel: [phone]  Page: [phone]     [facility]  [facility]  [address] [place] [address]      [facility]  [address] [facility] [place] [address]

## 2022-06-21 ENCOUNTER — APPOINTMENT (OUTPATIENT)
Dept: RADIATION ONCOLOGY | Facility: HOSPITAL | Age: 67
End: 2022-06-21
Attending: RADIOLOGY
Payer: COMMERCIAL

## 2022-06-21 PROCEDURE — 77338 DESIGN MLC DEVICE FOR IMRT: CPT | Mod: 26 | Performed by: RADIOLOGY

## 2022-06-21 PROCEDURE — 77300 RADIATION THERAPY DOSE PLAN: CPT | Mod: 26 | Performed by: RADIOLOGY

## 2022-06-21 PROCEDURE — 77338 DESIGN MLC DEVICE FOR IMRT: CPT | Performed by: RADIOLOGY

## 2022-06-21 PROCEDURE — 77293 RESPIRATOR MOTION MGMT SIMUL: CPT | Mod: 26 | Performed by: RADIOLOGY

## 2022-06-21 PROCEDURE — 77293 RESPIRATOR MOTION MGMT SIMUL: CPT | Performed by: RADIOLOGY

## 2022-06-21 PROCEDURE — 77301 RADIOTHERAPY DOSE PLAN IMRT: CPT | Mod: 26 | Performed by: RADIOLOGY

## 2022-06-21 PROCEDURE — 77301 RADIOTHERAPY DOSE PLAN IMRT: CPT | Performed by: RADIOLOGY

## 2022-06-21 PROCEDURE — 77300 RADIATION THERAPY DOSE PLAN: CPT | Performed by: RADIOLOGY

## 2022-06-27 ENCOUNTER — OFFICE VISIT (OUTPATIENT)
Dept: RADIATION ONCOLOGY | Facility: HOSPITAL | Age: 67
End: 2022-06-27
Attending: RADIOLOGY
Payer: COMMERCIAL

## 2022-06-27 VITALS
HEART RATE: 70 BPM | SYSTOLIC BLOOD PRESSURE: 94 MMHG | BODY MASS INDEX: 20.27 KG/M2 | WEIGHT: 113 LBS | DIASTOLIC BLOOD PRESSURE: 69 MMHG | OXYGEN SATURATION: 97 % | RESPIRATION RATE: 16 BRPM

## 2022-06-27 DIAGNOSIS — C34.12 MALIGNANT NEOPLASM OF UPPER LOBE OF LEFT LUNG (H): Primary | ICD-10-CM

## 2022-06-27 PROCEDURE — 77373 STRTCTC BDY RAD THER TX DLVR: CPT | Performed by: RADIOLOGY

## 2022-06-27 PROCEDURE — 77370 RADIATION PHYSICS CONSULT: CPT | Performed by: RADIOLOGY

## 2022-06-27 PROCEDURE — 77280 THER RAD SIMULAJ FIELD SMPL: CPT | Mod: 26 | Performed by: RADIOLOGY

## 2022-06-27 ASSESSMENT — PAIN SCALES - GENERAL: PAINLEVEL: NO PAIN (0)

## 2022-06-27 NOTE — PROGRESS NOTES
RADIATION ONCOLOGY WEEKLY TREATMENT VISIT NOTE      Assessment / Impression     Malignant neoplasm of upper lobe of left lung (H) [C34.12]     Tolerating radiation therapy well.  All questions and concerns addressed.    Plan:     Continue radiation treatment as prescribed.    Follow-up with radiation oncology in 2 months with CT chest.    Subjective:      HPI: Zafar Spencer is a 66 year old male with  Malignant neoplasm of upper lobe of left lung (H) [C34.12]    The following portions of the patient's history were reviewed and updated as appropriate: allergies, current medications, past family history, past medical history, past social history, past surgical history and problem list.    Assessment                  Body Site:  Thoracic Site: L lung  Stereotactic Radiosurgery: Yes  Stereotactic Radiosurgery date: 22  Concurrent Therapy: No  Today's Dose: 900  Total Dose for Thoracic: 4500  Today's Fraction/Total Fraction Thoracic: 1  Voice Chances/Stridor/Larynx: 1: Mild or intermittent hoarseness  Pharynx and Esphogaus: 0: No change over baseline  Hemoptysis: 0: None  Mucus Color: 0: Clear  Mucus Quantity: 1: Less than 1 tsp of mucus  Dyspnea: 2: Dyspnea on exertion                                       Sexuality Alteration                    Emotional Alteration    Copin: Effective  Comfort Alteration   KPS: 100 % Normal, no complaints  Fatigue (ONS scale): 2: Mild Fatigue  Pain Location: denies   Nutrition Alteration   Anorexia: 0: None  Nausea: 0: None  Vomitin: None  Weight: 51.3 kg (113 lb)  Pharynx and Esphogaus: 0: No change over baseline  Skin Alteration   Skin Sensation: 0: No problem  Skin Reaction: 0: None  AUA Assessment                                           Accompanied by       Objective:     Exam: Examination reviewed no significant changes.    Vitals:    22 1412   BP: 94/69   Pulse: 70   Resp: 16   SpO2: 97%   Weight: 51.3 kg (113 lb)   PainSc: No Pain (0)        Wt Readings from Last 8 Encounters:   06/27/22 51.3 kg (113 lb)   06/02/22 51.3 kg (113 lb)   04/29/22 51.3 kg (113 lb)   04/25/22 52.2 kg (115 lb)   04/18/22 53 kg (116 lb 12.8 oz)   04/14/22 50.6 kg (111 lb 9 oz)   03/09/22 51.8 kg (114 lb 1.6 oz)   02/14/22 52.2 kg (115 lb)       General: Alert and oriented, in no acute distress  Zafar has no Erythema.  Aria chart and setup information reviewed    Janessa Yanes MD

## 2022-06-27 NOTE — LETTER
2022         RE: Zafar Spencer  [address]  [address]  [facility] [place] [facility] [address]        Dear Colleague,    Thank you for referring your patient, Zafar Spencer, to the [facility] RADIATION ONCOLOGY [place]. Please see a copy of my visit note below.      RADIATION ONCOLOGY WEEKLY TREATMENT VISIT NOTE      Assessment / Impression     Malignant neoplasm of upper lobe of left lung (H) [C34.12]     Tolerating radiation therapy well.  All questions and concerns addressed.    Plan:     Continue radiation treatment as prescribed.    Follow-up with radiation oncology in 2 months with CT chest.    Subjective:      HPI: Zafar Spencer is a 66 year old male with  Malignant neoplasm of upper lobe of left lung (H) [C34.12]    The following portions of the patient's history were reviewed and updated as appropriate: allergies, current medications, past family history, past medical history, past social history, past surgical history and problem list.    Assessment                  Body Site:  Thoracic Site: L lung  Stereotactic Radiosurgery: Yes  Stereotactic Radiosurgery date: 22  Concurrent Therapy: No  Today's Dose: 900  Total Dose for Thoracic: 4500  Today's Fraction/Total Fraction Thoracic: 1/5  Voice Chances/Stridor/Larynx: 1: Mild or intermittent hoarseness  Pharynx and Esphogaus: 0: No change over baseline  Hemoptysis: 0: None  Mucus Color: 0: Clear  Mucus Quantity: 1: Less than 1 tsp of mucus  Dyspnea: 2: Dyspnea on exertion                                       Sexuality Alteration                    Emotional Alteration    Copin: Effective  Comfort Alteration   KPS: 100 % Normal, no complaints  Fatigue (ONS scale): 2: Mild Fatigue  Pain Location: denies   Nutrition Alteration   Anorexia: 0: None  Nausea: 0: None  Vomitin: None  Weight: 51.3 kg (113 lb)  Pharynx and Esphogaus: 0: No change over baseline  Skin Alteration   Skin Sensation: 0: No problem  Skin Reaction: 0: None  AUA  Assessment                                           Accompanied by       Objective:     Exam: Examination reviewed no significant changes.    Vitals:    06/27/22 1412   BP: 94/69   Pulse: 70   Resp: 16   SpO2: 97%   Weight: 51.3 kg (113 lb)   PainSc: No Pain (0)       Wt Readings from Last 8 Encounters:   06/27/22 51.3 kg (113 lb)   06/02/22 51.3 kg (113 lb)   04/29/22 51.3 kg (113 lb)   04/25/22 52.2 kg (115 lb)   04/18/22 53 kg (116 lb 12.8 oz)   04/14/22 50.6 kg (111 lb 9 oz)   03/09/22 51.8 kg (114 lb 1.6 oz)   02/14/22 52.2 kg (115 lb)       General: Alert and oriented, in no acute distress  Zafar has no Erythema.  Aria chart and setup information reviewed    Janessa Yanes MD        Again, thank you for allowing me to participate in the care of your patient.        Sincerely,        Janessa Yanes MD

## 2022-06-29 ENCOUNTER — APPOINTMENT (OUTPATIENT)
Dept: RADIATION ONCOLOGY | Facility: HOSPITAL | Age: 67
End: 2022-06-29
Attending: RADIOLOGY
Payer: COMMERCIAL

## 2022-06-29 PROCEDURE — 77373 STRTCTC BDY RAD THER TX DLVR: CPT | Performed by: RADIOLOGY

## 2022-07-01 ENCOUNTER — APPOINTMENT (OUTPATIENT)
Dept: RADIATION ONCOLOGY | Facility: HOSPITAL | Age: 67
End: 2022-07-01
Attending: RADIOLOGY
Payer: COMMERCIAL

## 2022-07-01 PROCEDURE — 77373 STRTCTC BDY RAD THER TX DLVR: CPT | Performed by: RADIOLOGY

## 2022-07-05 ENCOUNTER — APPOINTMENT (OUTPATIENT)
Dept: RADIATION ONCOLOGY | Facility: HOSPITAL | Age: 67
End: 2022-07-05
Attending: RADIOLOGY
Payer: COMMERCIAL

## 2022-07-05 PROCEDURE — 77373 STRTCTC BDY RAD THER TX DLVR: CPT | Performed by: RADIOLOGY

## 2022-07-07 ENCOUNTER — ALLIED HEALTH/NURSE VISIT (OUTPATIENT)
Dept: RADIATION ONCOLOGY | Facility: HOSPITAL | Age: 67
End: 2022-07-07
Attending: RADIOLOGY
Payer: COMMERCIAL

## 2022-07-07 PROCEDURE — 77435 SBRT MANAGEMENT: CPT | Performed by: RADIOLOGY

## 2022-07-07 PROCEDURE — 77373 STRTCTC BDY RAD THER TX DLVR: CPT | Performed by: STUDENT IN AN ORGANIZED HEALTH CARE EDUCATION/TRAINING PROGRAM

## 2022-07-07 PROCEDURE — 77336 RADIATION PHYSICS CONSULT: CPT | Performed by: RADIOLOGY

## 2022-07-07 NOTE — PROGRESS NOTES
Pt ambulatory to radiation clinic for last tx. Discharge instructions given. Informed to call with any questions or concerns. Follow up to be made on discharge.

## 2022-07-08 NOTE — PROGRESS NOTES
SBRT/SRS Treatment Summary    Patient: Zafar Spencer   MRN: [id number]  : 1955  Care Provider: Janessa Yanes MD    Date of Service: 2022        Poli Harrell MD  [address] [place] [id number]           Dear Dr. Harrell:     Your patient Mr. Zafar Spencer completed his radiation therapy on 2022. As you know Mr. Spencer is a 66 year old male with a  adenocarcinoma involving left upper lobe of lung with clinical stage T1 N0 M0.  The patient is not a good candidate for surgery given his poor medical status.  Patient received stereotactic radiosurgery for his lung cancer with a total dose of 4500 cGy in 5 treatments given from 2022 - 2022.  The patient tolerated ration therapy very well with minimal side effect.  He is scheduled return to radiation oncology in 2 months for routine post therapy office follow-up and restaging CT chest.    Again, thank you very much for the referral and allowing me to participate in the care of this patient.  If you have any questions or concerns about this patient, please do not hesitate to call.          Sincerely,      Janessa Yanes MD, PhD  Department of Radiation Oncology   [facility] Radiation Oncology  Tel: [phone]  Page: [phone]    [facility]  [address] [place] [address]     [address] [place] [address]    CC:  Patient Care Team:  Kirk Azevedo MD as PCP - General (Student in organized health care education/training program)  Kirk Azevedo MD as Assigned PCP  Poli Harrell MD as Assigned Heart and Vascular Provider  Janessa Yanes MD as MD (Radiation Oncology)  Janessa Yanes MD as Assigned Cancer Care Provider  Brenda Clark RP as Assigned MTM Pharmacist

## 2022-07-18 ENCOUNTER — TELEPHONE (OUTPATIENT)
Dept: RADIATION ONCOLOGY | Facility: HOSPITAL | Age: 67
End: 2022-07-18

## 2022-07-18 NOTE — TELEPHONE ENCOUNTER
Pt called to check on s/p RT, no answer. LM that this is a courtesy call to answer any questions or concerns and to call back if necessary. Reminded pt of follow up plan and appointment.

## 2022-08-29 ENCOUNTER — HOSPITAL ENCOUNTER (OUTPATIENT)
Dept: CT IMAGING | Facility: HOSPITAL | Age: 67
Discharge: HOME | End: 2022-08-29
Attending: RADIOLOGY | Admitting: RADIOLOGY
Payer: COMMERCIAL

## 2022-08-29 DIAGNOSIS — C34.12 MALIGNANT NEOPLASM OF UPPER LOBE OF LEFT LUNG (H): ICD-10-CM

## 2022-08-29 PROCEDURE — 71250 CT THORAX DX C-: CPT

## 2022-09-01 ENCOUNTER — OFFICE VISIT (OUTPATIENT)
Dept: RADIATION ONCOLOGY | Facility: HOSPITAL | Age: 67
End: 2022-09-01
Attending: RADIOLOGY
Payer: COMMERCIAL

## 2022-09-01 VITALS
SYSTOLIC BLOOD PRESSURE: 116 MMHG | RESPIRATION RATE: 16 BRPM | HEART RATE: 64 BPM | DIASTOLIC BLOOD PRESSURE: 70 MMHG | OXYGEN SATURATION: 98 % | WEIGHT: 114.4 LBS | BODY MASS INDEX: 20.52 KG/M2

## 2022-09-01 DIAGNOSIS — C34.12 MALIGNANT NEOPLASM OF UPPER LOBE OF LEFT LUNG (H): Primary | ICD-10-CM

## 2022-09-01 PROCEDURE — G0463 HOSPITAL OUTPT CLINIC VISIT: HCPCS

## 2022-09-01 ASSESSMENT — PAIN SCALES - GENERAL: PAINLEVEL: NO PAIN (0)

## 2022-09-01 NOTE — PROGRESS NOTES
"Oncology Rooming Note    September 1, 2022 12:52 PM   Zafar Spencer is a 67 year old male who presents for:    Chief Complaint   Patient presents with     Oncology Clinic Visit     Lung Cancer     Rad Onc follow up     Initial Vitals: There were no vitals taken for this visit. Estimated body mass index is 20.27 kg/m  as calculated from the following:    Height as of 6/2/22: 1.59 m (5' 2.6\").    Weight as of 6/27/22: 51.3 kg (113 lb). There is no height or weight on file to calculate BSA.  Data Unavailable Comment: Data Unavailable   No LMP for male patient.  Allergies reviewed: Yes  Medications reviewed: Yes    Medications: Medication refills not needed today.  Pharmacy name entered into [facility]: [facility] [id number] [place] [address]    Clinical concerns: feeling well, no concerns Dr. Yanes was notified.      Norma Cagle RN              "

## 2022-09-01 NOTE — LETTER
"    9/1/2022         RE: Zafar Spencer  [address]  [address]  [facility] [address]        Dear Colleague,    Thank you for referring your patient, Zafar Spencer, to the [facility] RADIATION ONCOLOGY [place]. Please see a copy of my visit note below.    Oncology Rooming Note    September 1, 2022 12:52 PM   Zafar Spencer is a 67 year old male who presents for:    Chief Complaint   Patient presents with     Oncology Clinic Visit     Lung Cancer     Rad Onc follow up     Initial Vitals: There were no vitals taken for this visit. Estimated body mass index is 20.27 kg/m  as calculated from the following:    Height as of 6/2/22: 1.59 m (5' 2.6\").    Weight as of 6/27/22: 51.3 kg (113 lb). There is no height or weight on file to calculate BSA.  Data Unavailable Comment: Data Unavailable   No LMP for male patient.  Allergies reviewed: Yes  Medications reviewed: Yes    Medications: Medication refills not needed today.  Pharmacy name entered into [facility]: [facility] [id number] [place] [address]    Clinical concerns: feeling well, no concerns Dr. Yanes was notified.      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  The patient is not a good candidate for surgery given his poor medical status.       TYPE OF RADIATION THERAPY ADMINISTERED:  4500 cGy in 5 treatments given from 6/27/2022 - 7/7/2022.     INTERVAL SINCE COMPLETION OF RADIATION THERAPY: 2 months.      SUBJECTIVE:  Mr. Spencer is a 67 year old male who presents with an asymptomatic LEFT upper lobe nodule identified on lung cancer screening CT. patient had a few screening CT chest over the past few years.  A most recent CT chest on 2/19/2022 reviewed new irregular nodule within the left upper lobe measuring 12 x 10 x 5 mm and a second 16 x 7 x " 3 mm abnormality in the right lower lobe.  This is worrisome for malignancy.  PET CT scan on 3/28/2022 showed FDG avid spiculated left upper lobe pleural-based mass suspicious for malignancy.  There is no FDG uptake in the right lower lobe lesion.  There is no radiographic evidence of lymph nodes and systemic metastasis.  Patient is not a good candidate for surgery given his current medical status. Patient received stereotactic radiosurgery for his lung cancer with a total dose of 4500 cGy in 5 treatments given from 6/27/2022 - 7/7/2022.  The patient tolerated ration therapy very well with minimal side effect.     The patient has been doing well since completion of the radiation therapy.  He denies any pain and discomfort at the time of evaluation.  He is here for routine post therapy office follow-up.    Medications were reviewed and are up to date on EPIC.    The following portions of the patient's history were reviewed and updated as appropriate: allergies, current medications, past family history, past medical history, past social history, past surgical history and problem list.    Review of Systems:      General  Constitutional  Constitutional (WDL): All constitutional elements are within defined limits  EENT  Eye Disorders  Eye Disorder (WDL): All eye disorder elements are within defined limits  Ear Disorders  Ear Disorder (WDL): All ear disorder elements are within defined limits  Respiratory  Respiratory  Respiratory (WDL): All respiratory elements are within defined limits  Cardiovascular  Cardiovascular  Cardiovascular (WDL): All cardiovascular elements are within defined limits  Gastrointestinal  Gastrointestinal  Gastrointestinal (WDL): All gastrointestinal elements are within defined limits  Musculoskeletal  Musculoskeletal and Connective Tissue Disorders  Musculoskeletal & Connective (WDL): All musculoskeletal & connective elements are within defined limits  Integumentary  Integumentary  Integumentary  (WDL): All integumentary elements are within defined limits  Neurological  Neurosensory  Neurosensory (WDL): All neurosensory elements are within defined limits  Genitourinary/Reproductive  Genitourinary  Genitourinary (WDL): All genitourinary elements are within defined limits  Lymphatic  Lymph System Disorders  Lymph (WDL): All lymph elements are within defined limits  Pain  Pain Score: No Pain (0)  AUA Assessment                                                              Accompanied by  Accompanied By: family    Objective:      PHYSICAL EXAMINATION:    /70   Pulse 64   Resp 16   Wt 51.9 kg (114 lb 6.4 oz)   SpO2 98%   BMI 20.52 kg/m      Gen: Alert, in NAD  Eyes: PERRL, EOMI, sclera anicteric  Pulm: No wheezing, stridor or respiratory distress  CV: Well-perfused, no cyanosis, no pedal edema  Back: No step-offs or pain to palpation along the thoracolumbar spine  Rectal: Deferred  : Deferred  Musculoskeletal: Normal muscle bulk and tone  Skin: Normal color and turgor  Neurologic: A/Ox3, CN II-XII intact, normal gait and station  Psychiatric: Appropriate mood and affect     Imaging: Imaging results 30 days: CT Chest w/o contrast    Result Date: 8/29/2022  EXAM: CT CHEST W/O CONTRAST LOCATION: [facility] DATE/TIME: 8/29/2022 10:43 AM INDICATION: Lung cancer, current or r o recurrence; Lung cancer treatment complication; No known automatically detected potential contraindications to iodinated contrast COMPARISON: Fiducial marker placement 6/2/2010 and 5/3/2022. PET CT 3/28/2022 TECHNIQUE: CT chest without IV contrast. Multiplanar reformats were obtained. Dose reduction techniques were used. CONTRAST: None. FINDINGS: LUNGS AND PLEURA: Fiducial markers now present along posterior aspect of the previously biopsied irregular subpleural left upper lobe mass. The mass has maximal dimensions of approximately 1.4 x 2.2 cm (wide platelike interface with pleural surface). An additional  fiducial marker lies far posteriorly and inferiorly within the pleural space. Prominent apical scarring unchanged, right lung worse than left. Changes of emphysema. No new suspicious lung lesion. MEDIASTINUM/AXILLAE: No lymphadenopathy. No thoracic aortic aneurysm. CORONARY ARTERY CALCIFICATION: Moderate. UPPER ABDOMEN: No significant finding. MUSCULOSKELETAL: No suspicious bony lesions.     IMPRESSION: 1.  Minimal change in the pleural-based 1.4 x 2.2 cm left upper lobe mass. 2.  No new abnormalities.      Impression     The patient is a 67-year-old gentleman with a diagnosis of early stage lung cancer status post SBRT completed 2-month ago.  The patient has been doing well with no evidence of recurrence.    Assessment & Plan:     1.  I have reviewed his restaging CT scan and compared to the previous radiation therapy field and CT scan.  There is no evidence of cancer recurrence.  He is scheduled return to radiation oncology in 3 months for office follow-up and CT chest.    2.  Continue follow-up with Dr. Kirk Azevedo, PCP as planned      Face to face time  15 minutes with > 80% spent on consultation, education and coordination of care.      Janessa Yanes MD, PhD  Department of Radiation Oncology   [facility]  Tel: [phone]  Page: [phone]    [facility]  [address] [place] [address]     [address]   [place] [address]    CC:  Patient Care Team:  Kirk Azevedo MD as PCP - General (Student in organized health care education/training program)  Kirk Azevedo MD as Assigned PCP  Poli Harrell MD as Assigned Heart and Vascular Provider  Janessa Yanes MD as MD (Radiation Oncology)  Janessa Yanes MD as Assigned Cancer Care Provider  Brenda Clark [facility] as Assigned MTM Pharmacist  Gabo Winn MD as Assigned Pulmonology Provider        Again, thank you for allowing me to participate in the care of your patient.         Sincerely,        Janessa Yanes MD

## 2022-09-01 NOTE — PROGRESS NOTES
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  The patient is not a good candidate for surgery given his poor medical status.       TYPE OF RADIATION THERAPY ADMINISTERED:  4500 cGy in 5 treatments given from 6/27/2022 - 7/7/2022.     INTERVAL SINCE COMPLETION OF RADIATION THERAPY: 2 months.      SUBJECTIVE:  Mr. Spencer is a 67 year old male who presents with an asymptomatic LEFT upper lobe nodule identified on lung cancer screening CT. patient had a few screening CT chest over the past few years.  A most recent CT chest on 2/19/2022 reviewed new irregular nodule within the left upper lobe measuring 12 x 10 x 5 mm and a second 16 x 7 x 3 mm abnormality in the right lower lobe.  This is worrisome for malignancy.  PET CT scan on 3/28/2022 showed FDG avid spiculated left upper lobe pleural-based mass suspicious for malignancy.  There is no FDG uptake in the right lower lobe lesion.  There is no radiographic evidence of lymph nodes and systemic metastasis.  Patient is not a good candidate for surgery given his current medical status. Patient received stereotactic radiosurgery for his lung cancer with a total dose of 4500 cGy in 5 treatments given from 6/27/2022 - 7/7/2022.  The patient tolerated ration therapy very well with minimal side effect.     The patient has been doing well since completion of the radiation therapy.  He denies any pain and discomfort at the time of evaluation.  He is here for routine post therapy office follow-up.    Medications were reviewed and are up to date on EPIC.    The following portions of the patient's history were reviewed and updated as appropriate: allergies, current medications, past family history, past medical history, past social history, past surgical history and problem list.    Review of Systems:       General  Constitutional  Constitutional (WDL): All constitutional elements are within defined limits  EENT  Eye Disorders  Eye Disorder (WDL): All eye disorder elements are within defined limits  Ear Disorders  Ear Disorder (WDL): All ear disorder elements are within defined limits  Respiratory  Respiratory  Respiratory (WDL): All respiratory elements are within defined limits  Cardiovascular  Cardiovascular  Cardiovascular (WDL): All cardiovascular elements are within defined limits  Gastrointestinal  Gastrointestinal  Gastrointestinal (WDL): All gastrointestinal elements are within defined limits  Musculoskeletal  Musculoskeletal and Connective Tissue Disorders  Musculoskeletal & Connective (WDL): All musculoskeletal & connective elements are within defined limits  Integumentary  Integumentary  Integumentary (WDL): All integumentary elements are within defined limits  Neurological  Neurosensory  Neurosensory (WDL): All neurosensory elements are within defined limits  Genitourinary/Reproductive  Genitourinary  Genitourinary (WDL): All genitourinary elements are within defined limits  Lymphatic  Lymph System Disorders  Lymph (WDL): All lymph elements are within defined limits  Pain  Pain Score: No Pain (0)  AUA Assessment                                                              Accompanied by  Accompanied By: family    Objective:      PHYSICAL EXAMINATION:    /70   Pulse 64   Resp 16   Wt 51.9 kg (114 lb 6.4 oz)   SpO2 98%   BMI 20.52 kg/m      Gen: Alert, in NAD  Eyes: PERRL, EOMI, sclera anicteric  Pulm: No wheezing, stridor or respiratory distress  CV: Well-perfused, no cyanosis, no pedal edema  Back: No step-offs or pain to palpation along the thoracolumbar spine  Rectal: Deferred  : Deferred  Musculoskeletal: Normal muscle bulk and tone  Skin: Normal color and turgor  Neurologic: A/Ox3, CN II-XII intact, normal gait and station  Psychiatric: Appropriate mood and affect     Imaging: Imaging  results 30 days: CT Chest w/o contrast    Result Date: 8/29/2022  EXAM: CT CHEST W/O CONTRAST LOCATION: [facility] DATE/TIME: 8/29/2022 10:43 AM INDICATION: Lung cancer, current or r o recurrence; Lung cancer treatment complication; No known automatically detected potential contraindications to iodinated contrast COMPARISON: Fiducial marker placement 6/2/2010 and 5/3/2022. PET CT 3/28/2022 TECHNIQUE: CT chest without IV contrast. Multiplanar reformats were obtained. Dose reduction techniques were used. CONTRAST: None. FINDINGS: LUNGS AND PLEURA: Fiducial markers now present along posterior aspect of the previously biopsied irregular subpleural left upper lobe mass. The mass has maximal dimensions of approximately 1.4 x 2.2 cm (wide platelike interface with pleural surface). An additional fiducial marker lies far posteriorly and inferiorly within the pleural space. Prominent apical scarring unchanged, right lung worse than left. Changes of emphysema. No new suspicious lung lesion. MEDIASTINUM/AXILLAE: No lymphadenopathy. No thoracic aortic aneurysm. CORONARY ARTERY CALCIFICATION: Moderate. UPPER ABDOMEN: No significant finding. MUSCULOSKELETAL: No suspicious bony lesions.     IMPRESSION: 1.  Minimal change in the pleural-based 1.4 x 2.2 cm left upper lobe mass. 2.  No new abnormalities.      Impression     The patient is a 67-year-old gentleman with a diagnosis of early stage lung cancer status post SBRT completed 2-month ago.  The patient has been doing well with no evidence of recurrence.    Assessment & Plan:     1.  I have reviewed his restaging CT scan and compared to the previous radiation therapy field and CT scan.  There is no evidence of cancer recurrence.  He is scheduled return to radiation oncology in 3 months for office follow-up and CT chest.    2.  Continue follow-up with Dr. Kirk Azevedo, PCP as planned      Face to face time  15 minutes with > 80% spent on consultation,  education and coordination of care.      Janessa Yanes MD, PhD  Department of Radiation Oncology   [facility]  Tel: [phone]  Page: [phone]    [facility]  [address] [place] [address]     [facility]   [address] [facility]   [place] [address]    CC:  Patient Care Team:  Kirk Azevedo MD as PCP - General (Student in organized health care education/training program)  Kirk Azevedo MD as Assigned PCP  Poli Harrell MD as Assigned Heart and Vascular Provider  Janessa Yanes MD as MD (Radiation Oncology)  Janessa Yanes MD as Assigned Cancer Care Provider  Brenda Clark, [facility] as Assigned MTM Pharmacist  Gabo Winn MD as Assigned Pulmonology Provider

## 2022-09-07 DIAGNOSIS — C34.12 MALIGNANT NEOPLASM OF UPPER LOBE OF LEFT LUNG (H): Primary | ICD-10-CM

## 2022-09-07 NOTE — PROGRESS NOTES
Asking for boost supplement due to weight loss and cancer diagnosis. Requested prescription was sent to pharmacy on record.

## 2022-11-28 ENCOUNTER — HOSPITAL ENCOUNTER (OUTPATIENT)
Dept: CT IMAGING | Facility: HOSPITAL | Age: 67
Discharge: HOME | End: 2022-11-28
Attending: RADIOLOGY | Admitting: RADIOLOGY
Payer: COMMERCIAL

## 2022-11-28 DIAGNOSIS — C34.12 MALIGNANT NEOPLASM OF UPPER LOBE OF LEFT LUNG (H): ICD-10-CM

## 2022-11-28 PROCEDURE — 71250 CT THORAX DX C-: CPT

## 2022-12-01 ENCOUNTER — OFFICE VISIT (OUTPATIENT)
Dept: RADIATION ONCOLOGY | Facility: HOSPITAL | Age: 67
End: 2022-12-01
Attending: RADIOLOGY
Payer: COMMERCIAL

## 2022-12-01 VITALS
HEART RATE: 80 BPM | WEIGHT: 122.8 LBS | OXYGEN SATURATION: 98 % | SYSTOLIC BLOOD PRESSURE: 112 MMHG | RESPIRATION RATE: 16 BRPM | DIASTOLIC BLOOD PRESSURE: 68 MMHG | BODY MASS INDEX: 22.03 KG/M2

## 2022-12-01 DIAGNOSIS — C34.12 MALIGNANT NEOPLASM OF UPPER LOBE OF LEFT LUNG (H): Primary | ICD-10-CM

## 2022-12-01 PROCEDURE — G0463 HOSPITAL OUTPT CLINIC VISIT: HCPCS

## 2022-12-01 PROCEDURE — 99213 OFFICE O/P EST LOW 20 MIN: CPT | Performed by: RADIOLOGY

## 2022-12-01 ASSESSMENT — PAIN SCALES - GENERAL: PAINLEVEL: NO PAIN (0)

## 2022-12-01 NOTE — PROGRESS NOTES
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  The patient is not a good candidate for surgery given his poor medical status.       TYPE OF RADIATION THERAPY ADMINISTERED:  4500 cGy in 5 treatments given from 6/27/2022 - 7/7/2022.     INTERVAL SINCE COMPLETION OF RADIATION THERAPY: 5 months.      SUBJECTIVE:  Mr. Spencer is a 67 year old male who presents with an asymptomatic LEFT upper lobe nodule identified on lung cancer screening CT. patient had a few screening CT chest over the past few years.  A most recent CT chest on 2/19/2022 reviewed new irregular nodule within the left upper lobe measuring 12 x 10 x 5 mm and a second 16 x 7 x 3 mm abnormality in the right lower lobe.  This is worrisome for malignancy.  PET CT scan on 3/28/2022 showed FDG avid spiculated left upper lobe pleural-based mass suspicious for malignancy.  There is no FDG uptake in the right lower lobe lesion.  There is no radiographic evidence of lymph nodes and systemic metastasis.  Patient is not a good candidate for surgery given his current medical status. Patient received stereotactic radiosurgery for his lung cancer with a total dose of 4500 cGy in 5 treatments given from 6/27/2022 - 7/7/2022.  The patient tolerated ration therapy very well with minimal side effect.      The patient has been doing well since completion of the radiation therapy.  He denies any pain and discomfort at the time of evaluation.  He is here for routine post therapy office follow-up.     Medications were reviewed and are up to date on EPIC.    The following portions of the patient's history were reviewed and updated as appropriate: allergies, current medications, past family history, past medical history, past social history, past surgical history and problem list.    Review of Systems:       General  Constitutional  Constitutional (WDL): All constitutional elements are within defined limits  EENT  Eye Disorders  Eye Disorder (WDL): All eye disorder elements are within defined limits  Ear Disorders  Ear Disorder (WDL): All ear disorder elements are within defined limits  Respiratory  Respiratory  Respiratory (WDL): All respiratory elements are within defined limits  Cardiovascular  Cardiovascular  Cardiovascular (WDL): All cardiovascular elements are within defined limits  Gastrointestinal  Gastrointestinal  Gastrointestinal (WDL): All gastrointestinal elements are within defined limits  Musculoskeletal  Musculoskeletal and Connective Tissue Disorders  Musculoskeletal & Connective (WDL): All musculoskeletal & connective elements are within defined limits  Integumentary  Integumentary  Integumentary (WDL): Exceptions to WDL  Pruritus: Mild or localized OR topical intervention indicated  Neurological  Neurosensory  Neurosensory (WDL): All neurosensory elements are within defined limits  Genitourinary/Reproductive  Genitourinary  Genitourinary (WDL): All genitourinary elements are within defined limits  Lymphatic  Lymph System Disorders  Lymph (WDL): All lymph elements are within defined limits  Pain  Pain Score: No Pain (0)  AUA Assessment                                                              Accompanied by  Accompanied By: family    Objective:      PHYSICAL EXAMINATION:    /68 (BP Location: Left arm, Patient Position: Sitting)   Pulse 80   Resp 16   Wt 55.7 kg (122 lb 12.8 oz)   SpO2 98%   BMI 22.03 kg/m      Gen: Alert, in NAD  Neck: Supple, full ROM, no LAD  Pulm: No wheezing, stridor or respiratory distress  CV: Well-perfused, no cyanosis, no pedal edema  Back: No step-offs or pain to palpation along the thoracolumbar spine  Rectal: Deferred  : Deferred  Musculoskeletal: Normal muscle bulk and tone  Skin: Normal color and turgor  Neurologic: A/Ox3, CN II-XII intact, normal gait  and station  Psychiatric: Appropriate mood and affect     Imaging: Imaging results 30 days: CT Chest w/o contrast    Result Date: 11/28/2022  EXAM: CT CHEST W/O CONTRAST LOCATION: [facility] DATE/TIME: 11/28/2022 11:05 AM INDICATION:  Malignant neoplasm of upper lobe of left lung (H). COMPARISON: 08/29/2022. TECHNIQUE: CT chest without IV contrast. Multiplanar reformats were obtained. Dose reduction techniques were used. CONTRAST: None. FINDINGS: LUNGS AND PLEURA: No significant change in the size of the 1.4 x 2.3 cm anterior left upper lobe subpleural nodule with adjacent fiducial marker. Stable emphysema. Small amount of retained airway secretions. MEDIASTINUM/AXILLAE: No adenopathy. Nonaneurysmal aorta. CORONARY ARTERY CALCIFICATION: Mild to moderate. UPPER ABDOMEN: No significant finding. MUSCULOSKELETAL: No focal bony lesion.     IMPRESSION: 1.  No significant change. 2.  No evidence of new or enlarging disease. 3.  No evidence of metastatic disease.        Impression     The patient is a 67-year-old gentleman with a diagnosis of early stage lung cancer status post SBRT completed 5-month ago.  The patient has been doing well with no evidence of recurrence.    Assessment & Plan:     1.  I have reviewed his restaging CT scan and compared to the previous radiation therapy field and CT scan.  There is no evidence of cancer recurrence.  He is scheduled return to radiation oncology in 4 months for office follow-up and CT chest.     2.  Continue follow-up with Dr. Kirk Azevedo, PCP as planned        Face to face time 20 minutes with > 80% spent on consultation, education and coordination of care.         Janessa Yanes MD, PhD  Department of Radiation Oncology   [facility]  Tel: [phone]  Page: [phone]    [facility]  [address]  [place] [address]     [facility]   [address] [place] [address]    CC:  Patient Care Team:  Kirk Azevedo MD as  PCP - General (Student in organized health care education/training program)  Kirk Azevedo MD as Assigned PCP  Poli Harrell MD as Assigned Heart and Vascular Provider  Janessa Yanes MD as MD (Radiation Oncology)  Janessa Yanes MD as Assigned Cancer Care Provider  Gabo Winn MD as Assigned Pulmonology Provider  Brenda Clark, [facility] as Assigned MTM Pharmacist

## 2022-12-01 NOTE — LETTER
12/1/2022         RE: Zafar Spencer  [address]  [address] [place] [id number]        Dear Colleague,    Thank you for referring your patient, Zafar Spencer, to the [facility] RADIATION [facility]COL[facility] [place]. Please see a copy of my visit note below.    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  The patient is not a good candidate for surgery given his poor medical status.       TYPE OF RADIATION THERAPY ADMINISTERED:  4500 cGy in 5 treatments given from 6/27/2022 - 7/7/2022.     INTERVAL SINCE COMPLETION OF RADIATION THERAPY: 5 months.      SUBJECTIVE:  Mr. Spencer is a 67 year old male who presents with an asymptomatic LEFT upper lobe nodule identified on lung cancer screening CT. patient had a few screening CT chest over the past few years.  A most recent CT chest on 2/19/2022 reviewed new irregular nodule within the left upper lobe measuring 12 x 10 x 5 mm and a second 16 x 7 x 3 mm abnormality in the right lower lobe.  This is worrisome for malignancy.  PET CT scan on 3/28/2022 showed FDG avid spiculated left upper lobe pleural-based mass suspicious for malignancy.  There is no FDG uptake in the right lower lobe lesion.  There is no radiographic evidence of lymph nodes and systemic metastasis.  Patient is not a good candidate for surgery given his current medical status. Patient received stereotactic radiosurgery for his lung cancer with a total dose of 4500 cGy in 5 treatments given from 6/27/2022 - 7/7/2022.  The patient tolerated ration therapy very well with minimal side effect.      The patient has been doing well since completion of the radiation therapy.  He denies any pain and discomfort at the time of evaluation.  He is here for routine post therapy office follow-up.     Medications were reviewed and are up to date on  EPIC.    The following portions of the patient's history were reviewed and updated as appropriate: allergies, current medications, past family history, past medical history, past social history, past surgical history and problem list.    Review of Systems:      General  Constitutional  Constitutional (WDL): All constitutional elements are within defined limits  EENT  Eye Disorders  Eye Disorder (WDL): All eye disorder elements are within defined limits  Ear Disorders  Ear Disorder (WDL): All ear disorder elements are within defined limits  Respiratory  Respiratory  Respiratory (WDL): All respiratory elements are within defined limits  Cardiovascular  Cardiovascular  Cardiovascular (WDL): All cardiovascular elements are within defined limits  Gastrointestinal  Gastrointestinal  Gastrointestinal (WDL): All gastrointestinal elements are within defined limits  Musculoskeletal  Musculoskeletal and Connective Tissue Disorders  Musculoskeletal & Connective (WDL): All musculoskeletal & connective elements are within defined limits  Integumentary  Integumentary  Integumentary (WDL): Exceptions to WDL  Pruritus: Mild or localized OR topical intervention indicated  Neurological  Neurosensory  Neurosensory (WDL): All neurosensory elements are within defined limits  Genitourinary/Reproductive  Genitourinary  Genitourinary (WDL): All genitourinary elements are within defined limits  Lymphatic  Lymph System Disorders  Lymph (WDL): All lymph elements are within defined limits  Pain  Pain Score: No Pain (0)  AUA Assessment                                                              Accompanied by  Accompanied By: family    Objective:      PHYSICAL EXAMINATION:    /68 (BP Location: Left arm, Patient Position: Sitting)   Pulse 80   Resp 16   Wt 55.7 kg (122 lb 12.8 oz)   SpO2 98%   BMI 22.03 kg/m      Gen: Alert, in NAD  Neck: Supple, full ROM, no LAD  Pulm: No wheezing, stridor or respiratory distress  CV: Well-perfused,  no cyanosis, no pedal edema  Back: No step-offs or pain to palpation along the thoracolumbar spine  Rectal: Deferred  : Deferred  Musculoskeletal: Normal muscle bulk and tone  Skin: Normal color and turgor  Neurologic: A/Ox3, CN II-XII intact, normal gait and station  Psychiatric: Appropriate mood and affect     Imaging: Imaging results 30 days: CT Chest w/o contrast    Result Date: 11/28/2022  EXAM: CT CHEST W/O CONTRAST LOCATION: [facility] DATE/TIME: 11/28/2022 11:05 AM INDICATION:  Malignant neoplasm of upper lobe of left lung (H). COMPARISON: 08/29/2022. TECHNIQUE: CT chest without IV contrast. Multiplanar reformats were obtained. Dose reduction techniques were used. CONTRAST: None. FINDINGS: LUNGS AND PLEURA: No significant change in the size of the 1.4 x 2.3 cm anterior left upper lobe subpleural nodule with adjacent fiducial marker. Stable emphysema. Small amount of retained airway secretions. MEDIASTINUM/AXILLAE: No adenopathy. Nonaneurysmal aorta. CORONARY ARTERY CALCIFICATION: Mild to moderate. UPPER ABDOMEN: No significant finding. MUSCULOSKELETAL: No focal bony lesion.     IMPRESSION: 1.  No significant change. 2.  No evidence of new or enlarging disease. 3.  No evidence of metastatic disease.        Impression     The patient is a 67-year-old gentleman with a diagnosis of early stage lung cancer status post SBRT completed 5-month ago.  The patient has been doing well with no evidence of recurrence.    Assessment & Plan:     1.  I have reviewed his restaging CT scan and compared to the previous radiation therapy field and CT scan.  There is no evidence of cancer recurrence.  He is scheduled return to radiation oncology in 4 months for office follow-up and CT chest.     2.  Continue follow-up with Dr. Kirk Azevedo, PCP as planned        Face to face time 20 minutes with > 80% spent on consultation, education and coordination of care.         Janessa Yanes MD, PhD  Department of  "Radiation Oncology   [facility]  Tel: [phone]  Page: [phone]    [facility]  [address]  [place] [address]     [facility]   [address] [facility] [place] [address]    CC:  Patient Care Team:  Kirk Azevedo MD as PCP - General (Student in organized health care education/training program)  Kirk Azevedo MD as Assigned PCP  Poli Harrell MD as Assigned Heart and Vascular Provider  Janessa Yanes MD as MD (Radiation Oncology)  Janessa Yanes MD as Assigned Cancer Care Provider  Gabo Winn MD as Assigned Pulmonology Provider  Brenda Clark RPH as Assigned MTM Pharmacist      Oncology Rooming Note    December 1, 2022 1:07 PM   Zafar Spencer is a 67 year old male who presents for:    Chief Complaint   Patient presents with     Oncology Clinic Visit     Follow up     Initial Vitals: /68 (BP Location: Left arm, Patient Position: Sitting)   Pulse 80   Resp 16   Wt 55.7 kg (122 lb 12.8 oz)   SpO2 98%   BMI 22.03 kg/m   Estimated body mass index is 22.03 kg/m  as calculated from the following:    Height as of 6/2/22: 1.59 m (5' 2.6\").    Weight as of this encounter: 55.7 kg (122 lb 12.8 oz). Body surface area is 1.57 meters squared.  No Pain (0) Comment: Data Unavailable   No LMP for male patient.  Allergies reviewed: Yes  Medications reviewed: Yes    Medications: Medication refills not needed today.  Pharmacy name entered into [facility]: [id number] [place] [address]    Clinical concerns: follow up, CT done 11/28 Dr. Yanes was notified.      Brittany Victor, MADDIE                Again, thank you for allowing me to participate in the care of your patient.        Sincerely,        Janessa Yanes MD    "

## 2022-12-01 NOTE — PROGRESS NOTES
"Oncology Rooming Note    December 1, 2022 1:07 PM   Zafar Spencer is a 67 year old male who presents for:    Chief Complaint   Patient presents with     Oncology Clinic Visit     Follow up     Initial Vitals: /68 (BP Location: Left arm, Patient Position: Sitting)   Pulse 80   Resp 16   Wt 55.7 kg (122 lb 12.8 oz)   SpO2 98%   BMI 22.03 kg/m   Estimated body mass index is 22.03 kg/m  as calculated from the following:    Height as of 6/2/22: 1.59 m (5' 2.6\").    Weight as of this encounter: 55.7 kg (122 lb 12.8 oz). Body surface area is 1.57 meters squared.  No Pain (0) Comment: Data Unavailable   No LMP for male patient.  Allergies reviewed: Yes  Medications reviewed: Yes    Medications: Medication refills not needed today.  Pharmacy name entered into [employer]: [id number] [place] [address]    Clinical concerns: follow up, CT done 11/28 Dr. Yanes was notified.      Brittany Victor RN            "

## 2023-01-27 ENCOUNTER — OFFICE VISIT (OUTPATIENT)
Dept: FAMILY MEDICINE | Facility: CLINIC | Age: 68
End: 2023-01-27
Payer: COMMERCIAL

## 2023-01-27 VITALS
DIASTOLIC BLOOD PRESSURE: 71 MMHG | BODY MASS INDEX: 22.79 KG/M2 | WEIGHT: 127 LBS | SYSTOLIC BLOOD PRESSURE: 108 MMHG | HEART RATE: 67 BPM | RESPIRATION RATE: 18 BRPM | OXYGEN SATURATION: 96 %

## 2023-01-27 DIAGNOSIS — Z01.818 PREOP GENERAL PHYSICAL EXAM: Primary | ICD-10-CM

## 2023-01-27 DIAGNOSIS — J43.9 PULMONARY EMPHYSEMA, UNSPECIFIED EMPHYSEMA TYPE (H): ICD-10-CM

## 2023-01-27 DIAGNOSIS — E44.0 MODERATE PROTEIN-CALORIE MALNUTRITION (H): ICD-10-CM

## 2023-01-27 DIAGNOSIS — H60.393 INFECTIVE OTITIS EXTERNA, BILATERAL: ICD-10-CM

## 2023-01-27 DIAGNOSIS — C34.12 MALIGNANT NEOPLASM OF UPPER LOBE OF LEFT LUNG (H): ICD-10-CM

## 2023-01-27 DIAGNOSIS — Z91.040 LATEX ALLERGY: ICD-10-CM

## 2023-01-27 PROCEDURE — 99214 OFFICE O/P EST MOD 30 MIN: CPT | Mod: GC | Performed by: STUDENT IN AN ORGANIZED HEALTH CARE EDUCATION/TRAINING PROGRAM

## 2023-01-27 RX ORDER — CIPROFLOXACIN AND DEXAMETHASONE 3; 1 MG/ML; MG/ML
4 SUSPENSION/ DROPS AURICULAR (OTIC) 2 TIMES DAILY
Qty: 7.5 ML | Refills: 0 | Status: SHIPPED | OUTPATIENT
Start: 2023-01-27 | End: 2023-04-09

## 2023-01-27 NOTE — PROGRESS NOTES
[facility] [address] [place] [address]  Phone: [phone]  Fax: [phone]  Primary Provider: Destiny Azevedo  Pre-op Performing Provider: DESTINY AZEVEDO      PREOPERATIVE EVALUATION:  Today's date: 1/27/2023    Zafar Spencer is a 67 year old male who presents for a preoperative evaluation.    Surgical Information:  Surgery/Procedure: Cataract   Surgery Location: [facility]  Surgeon: Alex Stephen MD  Surgery Date: 2/2/2023  Time of Surgery: unknown  Where patient plans to recover: At home with family  Fax number for surgical facility: [phone]    Type of Anesthesia Anticipated: MAC    Assessment & Plan     The proposed surgical procedure is considered LOW risk.    Preop general physical exam  Normal pre-op physical today aside from the below PMHx.    Malignant neoplasm of upper lobe of left lung (H)  Pulmonary emphysema, unspecified emphysema type (H)  Noted for anesthesia. Follows with radiation oncology. States today he is now in remission. Patient not felt to be a good candidate for surgery given medical status at the time of diagnosis.  Received 5 treatments of radiation from 6/27/2022 - 7/7/2022 with minimal side effects. Set to follow up at the end of March, repeat CT at that time.    Moderate protein-calorie malnutrition (H)  Weight has come up nicely in the last few months (113 at the start of radiation tx -> 127 today) with consistent 3 meals/day and boost supplementation.    Infective otitis externa, bilateral  Shouldn't preclude from surgery. Asymptomatic today.  - ciprofloxacin-dexamethasone (CIPRODEX) 0.3-0.1 % otic suspension  Dispense: 7.5 mL; Refill: 0    Latex allergy  Noted    -----------------------    Risks and Recommendations:  The patient has the following additional risks and recommendations for perioperative complications:   - No identified additional risk factors other than previously addressed    Medication  Instructions:   - Bleeding risk is low for this procedure (e.g. dental, skin, cataract).   - aspirin: Bleeding risk is low for this procedure and daily aspirin may be continued without modification.     RECOMMENDATION:  APPROVAL GIVEN to proceed with proposed procedure, without further diagnostic evaluation.      -----------------------------        Subjective     HPI related to upcoming procedure: Blurry vision x a few months    Still smoking about 2-3 cigs per day  Tried patches but not covered by insurance  Walks 5-6 miles without needing to stop - does once per week. Stairs are no problem  Eating 3 meals per day, supplementing with boost      Preop Questions 1/27/2023   1. Have you ever had a heart attack or stroke? No   2. Have you ever had surgery on your heart or blood vessels, such as a stent placement, a coronary artery bypass, or surgery on an artery in your head, neck, heart, or legs? No   3. Do you have chest pain with activity? No   4. Do you have a history of  heart failure? No   5. Do you currently have a cold, bronchitis or symptoms of other infection? No   6. Do you have a cough, shortness of breath, or wheezing? No   7. Do you or anyone in your family have previous history of blood clots? No   8. Do you or does anyone in your family have a serious bleeding problem such as prolonged bleeding following surgeries or cuts? No   9. Have you ever had problems with anemia or been told to take iron pills? No   10. Have you had any abnormal blood loss such as black, tarry or bloody stools? No   11. Have you ever had a blood transfusion? No   12. Are you willing to have a blood transfusion if it is medically needed before, during, or after your surgery? Yes   13. Have you or any of your relatives ever had problems with anesthesia? No   14. Do you have sleep apnea, excessive snoring or daytime drowsiness? No   15. Do you have any artifical heart valves or other implanted medical devices like a pacemaker,  defibrillator, or continuous glucose monitor? No   16. Do you have artificial joints? No   17. Are you allergic to latex? YES       Health Care Directive:  Patient does not have a Health Care Directive or Living Will: Discussed advance care planning with patient; however, patient declined at this time.      Review of Systems  Constitutional, neuro, ENT, endocrine, pulmonary, cardiac, gastrointestinal, genitourinary, musculoskeletal, integument and psychiatric systems are negative, except as otherwise noted.    Patient Active Problem List    Diagnosis Date Noted     Moderate protein-calorie malnutrition (H) 01/29/2023     Priority: Medium     Malignant neoplasm of upper lobe of left lung (H) 04/18/2022     Priority: Medium     Tobacco dependence      Priority: Medium     Closed fracture of distal end of right fibula with routine healing, unspecified fracture morphology, subsequent encounter 04/21/2021     Priority: Medium     -Injury on 12/24/20  -Seen on 1/5/21 -> continue CAM boot, repeat XR in 4 weeks  -Seen on 1/18/21 -> needed unemployment paperwork filled out  -[facility] 2/1/21 -> XR showed healing well -> wean off CAM boot, PT, repeat XR in 6 weeks  -Seen on 2/16/21 -> out of CAM boot, start PT  -Seen on 3/30/21 -> graduated from PT -> ordered XR to confirmed healed  -Seen on 4/15/21 -> XR reviewed (incomplete healing); patient okay for ambulation and work; wrote note stating results of XR for patient       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 Saw Dr. Tomas ([facility]) on 8/17 who confirmed mixed conductive and sensineural hearing loss worse in left. Recommended hearing aids with possible meatoplasty. Will discuss on 3 month Follow-up.       H/O colonoscopy 08/17/2018     Priority: Medium     Colonoscopy August 2018, normal, recommended 10 years until colonoscopy         HLD (hyperlipidemia) 08/10/2018     Priority: Medium     Pulmonary emphysema, unspecified emphysema type (H) 01/10/2018     Priority: Medium     Pulmonary nodules 12/06/2017     Priority: Medium     Unchanged on 07/19 low dose CT scan. Recommend annual screening with low dose CT.       Benign prostatic hyperplasia with urinary frequency 12/06/2017     Priority: Medium     Tobacco use 07/14/2017     Priority: Medium      Past Medical History:   Diagnosis Date     Bronchitis      Pulmonary nodules      Tobacco dependence      Past Surgical History:   Procedure Laterality Date     COLONOSCOPY N/A 8/16/2018    Procedure: COLONOSCOPY;  Surgeon: Alex Roche III, MD;  Location: [facility];  Service:      OTHER SURGICAL HISTORY      double hernia     OTHER SURGICAL HISTORY      hand (right)     Current Outpatient Medications   Medication Sig Dispense Refill     ciprofloxacin-dexamethasone (CIPRODEX) 0.3-0.1 % otic suspension Place 4 drops into both ears 2 times daily 7.5 mL 0     aspirin (ASA) 81 MG EC tablet Take 1 tablet (81 mg) by mouth daily 90 tablet 3     Nutritional Supplements (BOOST HIGH PROTEIN) LIQD Take 1 Can by mouth daily 7110 mL 3       Allergies   Allergen Reactions     Latex Rash     Penicillins Rash     rash        Social History     Tobacco Use     Smoking status: Every Day     Packs/day: 1.00     Years: 52.00     Pack years: 52.00     Types: Cigarettes     Smokeless tobacco: Never     Tobacco comments:     .25 pack currently   Substance Use Topics     Alcohol use: No     Family History   Problem Relation Age of Onset     Diabetes Mother      Heart Disease Mother      Hypertension No family hx of      Coronary Artery Disease No family hx of      Cancer No family hx of      History   Drug Use     Frequency: 2.0 times per week     Types: Marijuana         Objective     /71   Pulse 67   Resp 18   Wt 57.6 kg (127 lb)   SpO2 96%   BMI 22.79 kg/m      Physical Exam    GENERAL APPEARANCE:  mildly chronically ill appearing     EYES: EOMI,  PERRL     HENT: ear canals with purulent discharge occluding TM b/l     NECK: no adenopathy, no asymmetry, masses, or scars      RESP: lungs diminished but clear to auscultation - no rales, rhonchi or wheezes     CV: regular rates and rhythm, normal S1 S2, no S3 or S4 and no murmur, click or rub     ABDOMEN:  soft, nontender, no HSM or masses and bowel sounds normal     MS: extremities normal- no gross deformities noted, no evidence of inflammation in joints, FROM in all extremities.     SKIN: no suspicious lesions or rashes     NEURO: Normal strength and tone, sensory exam grossly normal, mentation intact and speech normal     PSYCH: mentation appears normal. and affect normal/bright     LYMPHATICS: No cervical adenopathy    Recent Labs   Lab Test 06/02/22  0857 05/03/22  0800 04/22/22  0848 02/17/22  1108 02/17/22  1108 05/20/21  1145   HGB 13.2*  --  12.5  --  13.0*  --     309  --    < > 294  --    INR 1.11 1.10  --   --   --   --    NA  --   --   --   --  139  --    POTASSIUM  --   --   --   --  4.4  --    CR  --   --   --   --  0.86  --    A1C  --   --   --   --   --  5.8*    < > = values in this interval not displayed.        Diagnostics:  None    Revised Cardiac Risk Index (RCRI):  The patient has the following serious cardiovascular risks for perioperative complications:   - No serious cardiac risks = 0 points     RCRI Interpretation: 0 points: Class I (very low risk - 0.4% complication rate)           Signed Electronically by: Kirk Azevedo MD  Copy of this evaluation report is provided to requesting physician.

## 2023-01-29 PROBLEM — E44.0 MODERATE PROTEIN-CALORIE MALNUTRITION (H): Status: ACTIVE | Noted: 2023-01-29

## 2023-01-30 ENCOUNTER — PATIENT OUTREACH (OUTPATIENT)
Dept: FAMILY MEDICINE | Facility: CLINIC | Age: 68
End: 2023-01-30
Payer: COMMERCIAL

## 2023-01-30 DIAGNOSIS — H60.393 INFECTIVE OTITIS EXTERNA, BILATERAL: Primary | ICD-10-CM

## 2023-01-30 NOTE — TELEPHONE ENCOUNTER
"Per pharmacy need script clarification \"THIS MEDICATION IS OVER $100 FOR THE PATIENT. PLEASE PRESCRIBE ALTERNATIVE IF POSSIBLE. THANKS.\"  "

## 2023-01-31 RX ORDER — NEOMYCIN SULFATE, POLYMYXIN B SULFATE, HYDROCORTISONE 3.5; 10000; 1 MG/ML; [USP'U]/ML; MG/ML
3 SOLUTION/ DROPS AURICULAR (OTIC) 4 TIMES DAILY
Qty: 10 ML | Refills: 0 | Status: SHIPPED | OUTPATIENT
Start: 2023-01-31 | End: 2023-04-09

## 2023-01-31 NOTE — TELEPHONE ENCOUNTER
Cipro-dex sent to pharmacy reportedly not covered by insurance. Will try a different ear drop for otitis externa. Sent corticosporin to pharmacy.  Dr. Azevedo

## 2023-01-31 NOTE — PROGRESS NOTES
Preceptor Attestation:   Patient seen, evaluated and discussed with the resident. I have verified the content of the note, which accurately reflects my assessment of the patient and the plan of care.  Supervising Physician:Erika Becker DO [facility]

## 2023-02-01 NOTE — PATIENT INSTRUCTIONS
Pre-op faxed to fax number :  [phone]  Location :  [facility] [place] [facility]   Date of Surgery :  02/02/2023  By/Date :  Myesha 02/01/2023

## 2023-03-09 DIAGNOSIS — E78.5 HYPERLIPIDEMIA, UNSPECIFIED HYPERLIPIDEMIA TYPE: ICD-10-CM

## 2023-03-09 RX ORDER — ASPIRIN 81 MG/1
TABLET, COATED ORAL
Qty: 90 TABLET | Refills: 3 | Status: SHIPPED | OUTPATIENT
Start: 2023-03-09 | End: 2023-04-07

## 2023-03-09 NOTE — TELEPHONE ENCOUNTER
"Request for medication refill:  aspirin (ASA) 81 MG EC tablet    Providers if patient needs an appointment and you are willing to give a one month supply please refill for one month and  send a letter/MyChart using \".SMILLIMITEDREFILL\" .smillimited and route chart to \"P SMI \" (Giving one month refill in non controlled medications is strongly recommended before denial)    If refill has been denied, meaning absolutely no refills without visit, please complete the smart phrase \".smirxrefuse\" and route it to the \"P SMI MED REFILLS\"  pool to inform the patient and the pharmacy.    Marifer Rebolledo MA      '  "

## 2023-03-17 ENCOUNTER — OFFICE VISIT (OUTPATIENT)
Dept: FAMILY MEDICINE | Facility: CLINIC | Age: 68
End: 2023-03-17
Payer: COMMERCIAL

## 2023-03-17 ENCOUNTER — NURSE TRIAGE (OUTPATIENT)
Dept: NURSING | Facility: CLINIC | Age: 68
End: 2023-03-17
Payer: COMMERCIAL

## 2023-03-17 ENCOUNTER — TELEPHONE (OUTPATIENT)
Dept: FAMILY MEDICINE | Facility: CLINIC | Age: 68
End: 2023-03-17

## 2023-03-17 VITALS
RESPIRATION RATE: 20 BRPM | HEART RATE: 64 BPM | DIASTOLIC BLOOD PRESSURE: 81 MMHG | BODY MASS INDEX: 22.86 KG/M2 | SYSTOLIC BLOOD PRESSURE: 126 MMHG | WEIGHT: 127.4 LBS | TEMPERATURE: 97.8 F | OXYGEN SATURATION: 96 %

## 2023-03-17 DIAGNOSIS — R14.3 FLATULENCE, ERUCTATION AND GAS PAIN: ICD-10-CM

## 2023-03-17 DIAGNOSIS — R73.03 PREDIABETES: Primary | ICD-10-CM

## 2023-03-17 DIAGNOSIS — R14.1 FLATULENCE, ERUCTATION AND GAS PAIN: ICD-10-CM

## 2023-03-17 DIAGNOSIS — R14.2 FLATULENCE, ERUCTATION AND GAS PAIN: ICD-10-CM

## 2023-03-17 LAB
ALBUMIN SERPL BCG-MCNC: 4.4 G/DL (ref 3.5–5.2)
ALP SERPL-CCNC: 62 U/L (ref 40–129)
ALT SERPL W P-5'-P-CCNC: 14 U/L (ref 10–50)
ANION GAP SERPL CALCULATED.3IONS-SCNC: 9 MMOL/L (ref 7–15)
AST SERPL W P-5'-P-CCNC: 25 U/L (ref 10–50)
BASOPHILS # BLD AUTO: 0.1 10E3/UL (ref 0–0.2)
BASOPHILS NFR BLD AUTO: 1 %
BILIRUB SERPL-MCNC: 0.3 MG/DL
BUN SERPL-MCNC: 14.9 MG/DL (ref 8–23)
CALCIUM SERPL-MCNC: 9.6 MG/DL (ref 8.8–10.2)
CHLORIDE SERPL-SCNC: 104 MMOL/L (ref 98–107)
CREAT SERPL-MCNC: 1.03 MG/DL (ref 0.67–1.17)
DEPRECATED HCO3 PLAS-SCNC: 27 MMOL/L (ref 22–29)
EOSINOPHIL # BLD AUTO: 0.4 10E3/UL (ref 0–0.7)
EOSINOPHIL NFR BLD AUTO: 6 %
ERYTHROCYTE [DISTWIDTH] IN BLOOD BY AUTOMATED COUNT: 12.9 % (ref 10–15)
GFR SERPL CREATININE-BSD FRML MDRD: 80 ML/MIN/1.73M2
GLUCOSE SERPL-MCNC: 103 MG/DL (ref 70–99)
HBA1C MFR BLD: 5.6 % (ref 0–5.6)
HCT VFR BLD AUTO: 40.7 % (ref 40–53)
HGB BLD-MCNC: 13.6 G/DL (ref 13.3–17.7)
IMM GRANULOCYTES # BLD: 0 10E3/UL
IMM GRANULOCYTES NFR BLD: 0 %
LYMPHOCYTES # BLD AUTO: 1.5 10E3/UL (ref 0.8–5.3)
LYMPHOCYTES NFR BLD AUTO: 23 %
MCH RBC QN AUTO: 31.5 PG (ref 26.5–33)
MCHC RBC AUTO-ENTMCNC: 33.4 G/DL (ref 31.5–36.5)
MCV RBC AUTO: 94 FL (ref 78–100)
MONOCYTES # BLD AUTO: 0.7 10E3/UL (ref 0–1.3)
MONOCYTES NFR BLD AUTO: 11 %
NEUTROPHILS # BLD AUTO: 3.7 10E3/UL (ref 1.6–8.3)
NEUTROPHILS NFR BLD AUTO: 59 %
PLATELET # BLD AUTO: 314 10E3/UL (ref 150–450)
POTASSIUM SERPL-SCNC: 5.2 MMOL/L (ref 3.4–5.3)
PROT SERPL-MCNC: 7.5 G/DL (ref 6.4–8.3)
RBC # BLD AUTO: 4.32 10E6/UL (ref 4.4–5.9)
SODIUM SERPL-SCNC: 140 MMOL/L (ref 136–145)
WBC # BLD AUTO: 6.2 10E3/UL (ref 4–11)

## 2023-03-17 PROCEDURE — 85025 COMPLETE CBC W/AUTO DIFF WBC: CPT | Performed by: FAMILY MEDICINE

## 2023-03-17 PROCEDURE — 99214 OFFICE O/P EST MOD 30 MIN: CPT | Performed by: FAMILY MEDICINE

## 2023-03-17 PROCEDURE — 36415 COLL VENOUS BLD VENIPUNCTURE: CPT | Performed by: FAMILY MEDICINE

## 2023-03-17 PROCEDURE — 83036 HEMOGLOBIN GLYCOSYLATED A1C: CPT | Performed by: FAMILY MEDICINE

## 2023-03-17 PROCEDURE — 80053 COMPREHEN METABOLIC PANEL: CPT | Performed by: FAMILY MEDICINE

## 2023-03-17 NOTE — TELEPHONE ENCOUNTER
Called patient, no answer. Left voicemail providing call back number. Please call patient again if no call back return to relay message.    Britt Odell MA on 3/17/2023 at 3:58 PM

## 2023-03-17 NOTE — LETTER
March 21, 2023      Zafar Spencer  [address]  [address]  [place] [address]        Dear ,    We are writing to inform you of your test results.    Your lab results all came back normal. Please follow up with your primary care provider in the next two weeks as discussed.    Resulted Orders   Comprehensive metabolic panel   Result Value Ref Range    Sodium 140 136 - 145 mmol/L    Potassium 5.2 3.4 - 5.3 mmol/L    Chloride 104 98 - 107 mmol/L    Carbon Dioxide (CO2) 27 22 - 29 mmol/L    Anion Gap 9 7 - 15 mmol/L    Urea Nitrogen 14.9 8.0 - 23.0 mg/dL    Creatinine 1.03 0.67 - 1.17 mg/dL    Calcium 9.6 8.8 - 10.2 mg/dL    Glucose 103 (H) 70 - 99 mg/dL    Alkaline Phosphatase 62 40 - 129 U/L    AST 25 10 - 50 U/L    ALT 14 10 - 50 U/L    Protein Total 7.5 6.4 - 8.3 g/dL    Albumin 4.4 3.5 - 5.2 g/dL    Bilirubin Total 0.3 <=1.2 mg/dL    GFR Estimate 80 >60 mL/min/1.73m2      Comment:      eGFR calculated using 2021 CKD-EPI equation.   Hemoglobin A1c   Result Value Ref Range    Hemoglobin A1C 5.6 0.0 - 5.6 %      Comment:      Normal <5.7%   Prediabetes 5.7-6.4%    Diabetes 6.5% or higher     Note: Adopted from ADA consensus guidelines.   CBC with platelets and differential   Result Value Ref Range    WBC Count 6.2 4.0 - 11.0 10e3/uL    RBC Count 4.32 (L) 4.40 - 5.90 10e6/uL    Hemoglobin 13.6 13.3 - 17.7 g/dL    Hematocrit 40.7 40.0 - 53.0 %    MCV 94 78 - 100 fL    MCH 31.5 26.5 - 33.0 pg    MCHC 33.4 31.5 - 36.5 g/dL    RDW 12.9 10.0 - 15.0 %    Platelet Count 314 150 - 450 10e3/uL    % Neutrophils 59 %    % Lymphocytes 23 %    % Monocytes 11 %    % Eosinophils 6 %    % Basophils 1 %    % Immature Granulocytes 0 %    Absolute Neutrophils 3.7 1.6 - 8.3 10e3/uL    Absolute Lymphocytes 1.5 0.8 - 5.3 10e3/uL    Absolute Monocytes 0.7 0.0 - 1.3 10e3/uL    Absolute Eosinophils 0.4 0.0 - 0.7 10e3/uL    Absolute Basophils 0.1 0.0 - 0.2 10e3/uL    Absolute Immature Granulocytes 0.0 <=0.4 10e3/uL       If you  have any questions or concerns, please call the clinic at the number listed above.       Sincerely,      [facility]

## 2023-03-17 NOTE — TELEPHONE ENCOUNTER
----- Message from Norma Dao MD sent at 3/17/2023  3:53 PM CDT -----  Please let patient know all labs are normal.  He should follow-up with his PCP in the next 2 weeks as discussed.    Norma Dao M.D. 3/17/2023 3:53 PM

## 2023-03-17 NOTE — PATIENT INSTRUCTIONS
I think your gassiness may be diet related.  I would recommend increasing fruits, vegetables, whole grains, beans.  I would avoid cookies, candy, cake.    We will check some lab work today and we will notify you of the results when we get the back.  I would recommend making a follow-up appointment with your primary care provider in the next couple of weeks for follow-up of your concerns and other chronic illnesses.

## 2023-03-17 NOTE — PROGRESS NOTES
Assessment:       Prediabetes  - Hemoglobin A1c    Flatulence, eructation and gas pain  - Comprehensive metabolic panel  - CBC with platelets differential         Plan:     Patient with history of prediabetes in need of follow-up with his PCP.  He was unable to get in today so he came into walk-in care.  Did order a hemoglobin A1c, CBC, and CMP since he is due for these recommended he make a follow-up appointment in the next couple of weeks for follow-up with his PCP to review lab work and for ongoing management of chronic disease.  Advised that he scale back on eating cookies cakes and candy as I think this is likely was giving him his GI upset.  Increase vegetables, fruits, whole grains, nuts, beans, seeds and other healthy forms of protein.  She is agreeable with this plan.    Ear exam normal.      Patient Instructions   I think your gassiness may be diet related.  I would recommend increasing fruits, vegetables, whole grains, beans.  I would avoid cookies, candy, cake.    We will check some lab work today and we will notify you of the results when we get the back.  I would recommend making a follow-up appointment with your primary care provider in the next couple of weeks for follow-up of your concerns and other chronic illnesses.      Subjective:       67 year old male with a history of lung cancer and prediabetes presents for evaluation of a couple week history of increased gassiness and flatulence anytime he eats cookies cakes or candy.  He admits to not eating a very healthy diet.  Its been about a year since he is followed up on his diabetes.  He was unable to get into his PCP today so is here today for follow-up of this.  He denies any chest pain or shortness of breath.  Denies abdominal pain.  He occasionally has some yellowish waxy liquid coming from his ears and wants to make sure he does not have cerumen impaction.  Nuys ear pain.  He otherwise feels well.    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     Closed fracture of distal end of right fibula with routine healing, unspecified fracture morphology, subsequent encounter     Tobacco dependence     Malignant neoplasm of upper lobe of left lung (H)     Moderate protein-calorie malnutrition (H)       Past Medical History:   Diagnosis Date     Bronchitis      Pulmonary nodules      Tobacco dependence        Past Surgical History:   Procedure Laterality Date     COLONOSCOPY N/A 8/16/2018    Procedure: COLONOSCOPY;  Surgeon: Alex Roche III, MD;  Location: [facility];  Service:      OTHER SURGICAL HISTORY      double hernia     OTHER SURGICAL HISTORY      hand (right)       Current Outpatient Medications   Medication     ASPIRIN LOW DOSE 81 MG EC tablet     ciprofloxacin-dexamethasone (CIPRODEX) 0.3-0.1 % otic suspension     neomycin-polymyxin-hydrocortisone (CORTISPORIN) 3.5-28312-6 otic solution     Nutritional Supplements (BOOST HIGH PROTEIN) LIQD     No current facility-administered medications for this visit.       Allergies   Allergen Reactions     Latex Rash     Penicillins Rash     rash       Family History   Problem Relation Age of Onset     Diabetes Mother      Heart Disease Mother      Hypertension No family hx of      Coronary Artery Disease No family hx of      Cancer No family hx of        Social History     Socioeconomic History     Marital status: Single     Spouse name: None     Number of children: None     Years of education: None     Highest education level: None   Occupational History     Occupation: [employer]   Tobacco Use     Smoking status: Every Day     Packs/day: 1.00     Years: 52.00     Pack years: 52.00     Types: Cigarettes     Smokeless tobacco: Never      Tobacco comments:     .25 pack currently   Substance and Sexual Activity     Alcohol use: No     Drug use: Yes     Frequency: 2.0 times per week     Types: Marijuana         Review of Systems  Pertinent items are noted in HPI.      Objective:     /81 (BP Location: Left arm, Patient Position: Sitting, Cuff Size: Adult Regular)   Pulse 64   Temp 97.8  F (36.6  C) (Oral)   Resp 20   Wt 57.8 kg (127 lb 6.4 oz)   SpO2 96%   BMI 22.86 kg/m       General Appearance:    Alert, pleasant, cooperative, no distress, appears disheveled, older than stated age.   Head:    Normocephalic, without obvious abnormality, atraumatic   Eyes:    Conjunctiva/corneas clear   Ears:    Normal TM's without erythema or bulging. Normal external ear canals, both ears   Nose:   Nares normal, septum midline, mucosa normal, no drainage    or sinus tenderness   Throat:   Lips, mucosa, and tongue normal; teeth and gums normal.  No tonsilar hypertrophy or exudate.   Neck:    Cardiovascular:   Supple, symmetrical, trachea midline, no adenopathy;     thyroid:  no enlargement/tenderness/nodules  Regular rate and rhythm, no murmurs, rubs, or gallops.   Lungs:    Abdomen:  Extremities:  Skin:         Clear to auscultation bilaterally without wheezes, rales, or rhonchi, respirations unlabored  Soft, nontender  Warm and well perfused  No rashes                               This note has been dictated using voice recognition software. Any grammatical or context distortions are unintentional and inherent to the software

## 2023-03-17 NOTE — TELEPHONE ENCOUNTER
Triage Call:    Caller: Patient and SO Myesha ( verbal consent given)    Pt has been having ongoing issues with digestion and worry about diabetes. On going symptoms include, lots of gas, thirsty, more tired, sleeping more.   Lots of acid in throat; reflux like symptoms/heartburn     No increase in urination BM ok      Prediabetes dx in 2021/2022    Pt recovering from cancer treatments;  5 radiation treatments, lung cancer in 2022 summer.    Discussed Hgb A1c, may need to revisit this and also anemia, reflux      Protocol Recommended Disposition: See in office today     Caller verbalized understanding of instructions and questions answered.      FABIAN GUERRIER RN on 3/17/2023 at 9:09 AM       Reason for Disposition    Age > 60 years    Additional Information    Negative: Bloody, black, or tarry bowel movements  (Exception: Chronic-unchanged black-grey bowel movements and is taking iron pills or Pepto-Bismol.)    Negative: Vomiting red blood or black (coffee ground) material    Negative: SEVERE abdominal pain (e.g., excruciating)    Negative: Pain lasting > 10 minutes and over 50 years old    Negative: Pain lasting > 10 minutes and over 40 years old and associated chest, arm, neck, upper back, or jaw pain    Negative: Pain lasting > 10 minutes and over 35 years old and at least one cardiac risk factor (i.e., hypertension, diabetes, obesity, smoker or strong family history of heart disease)    Negative: Pain lasting > 10 minutes and history of heart disease (i.e., heart attack, bypass surgery, angina, angioplasty, CHF)    Negative: Recent injury to the abdomen    Negative: Vomiting bile (green color)    Negative: Constant abdominal pain lasting > 2 hours    Negative: Pregnant 20 weeks or more and new hand or face swelling    Negative: Patient sounds very sick or weak to the triager    Negative: Vomiting and abdomen looks much more swollen than usual    Negative: White of the eyes have turned yellow  "(i.e., jaundice)    Negative: Fever > 103 F (39.4 C)    Negative: Fever > 101 F (38.3 C) and over 60 years of age    Negative: Fever > 100.0 F (37.8 C) and has diabetes mellitus or a weak immune system (e.g., HIV positive, cancer chemotherapy, organ transplant, splenectomy, chronic steroids)    Negative: Fever > 100.0 F (37.8 C) and bedridden (e.g., nursing home patient, stroke, chronic illness, recovering from surgery)    Answer Assessment - Initial Assessment Questions  1. LOCATION: \"Where does it hurt?\"      Above the belly button - especially when eating     2. RADIATION: \"Does the pain shoot anywhere else?\" (e.g., chest, back)        3. ONSET: \"When did the pain begin?\" (e.g., minutes, hours or days ago)       After eating     4. SUDDEN: \"Gradual or sudden onset?\"      After eating - pressure     5. PATTERN \"Does the pain come and go, or is it constant?\"     - If constant: \"Is it getting better, staying the same, or worsening?\"       (Note: Constant means the pain never goes away completely; most serious pain is constant and it progresses)      - If intermittent: \"How long does it last?\" \"Do you have pain now?\"      (Note: Intermittent means the pain goes away completely between bouts)     Bloating and discomfort   6. SEVERITY: \"How bad is the pain?\"  (e.g., Scale 1-10; mild, moderate, or severe)     - MILD (1-3): doesn't interfere with normal activities, abdomen soft and not tender to touch      - MODERATE (4-7): interferes with normal activities or awakens from sleep, abdomen tender to touch      - SEVERE (8-10): excruciating pain, doubled over, unable to do any normal activities          7. RECURRENT SYMPTOM: \"Have you ever had this type of stomach pain before?\" If Yes, ask: \"When was the last time?\" and \"What happened that time?\"         8. AGGRAVATING FACTORS: \"Does anything seem to cause this pain?\" (e.g., foods, stress, alcohol)      Worse     9. CARDIAC SYMPTOMS: \"Do you have any of the following " "symptoms: chest pain, difficulty breathing, sweating, nausea?\"      *No Answer*  10. OTHER SYMPTOMS: \"Do you have any other symptoms?\" (e.g., back pain, diarrhea, fever, urination pain, vomiting)        *No Answer*  11. PREGNANCY: \"Is there any chance you are pregnant?\" \"When was your last menstrual period?\"        *No Answer*    Protocols used: ABDOMINAL PAIN - UPPER-A-OH      "

## 2023-03-20 NOTE — TELEPHONE ENCOUNTER
Call and did not leave message due message left for patient this morning.    Marcie Odell on 3/20/2023 at 3:44 PM

## 2023-03-20 NOTE — TELEPHONE ENCOUNTER
Called, no answer. Left another voicemail providing call back number. Please call patient back to relay message if no call back.    Britt Odell MA on 3/20/2023 at 11:53 AM

## 2023-03-27 ENCOUNTER — HOSPITAL ENCOUNTER (OUTPATIENT)
Dept: CT IMAGING | Facility: HOSPITAL | Age: 68
Discharge: HOME | End: 2023-03-27
Attending: RADIOLOGY | Admitting: RADIOLOGY
Payer: COMMERCIAL

## 2023-03-27 DIAGNOSIS — C34.12 MALIGNANT NEOPLASM OF UPPER LOBE OF LEFT LUNG (H): ICD-10-CM

## 2023-03-27 PROCEDURE — 71250 CT THORAX DX C-: CPT

## 2023-04-06 ENCOUNTER — OFFICE VISIT (OUTPATIENT)
Dept: RADIATION ONCOLOGY | Facility: HOSPITAL | Age: 68
End: 2023-04-06
Attending: RADIOLOGY
Payer: COMMERCIAL

## 2023-04-06 VITALS
OXYGEN SATURATION: 98 % | TEMPERATURE: 97.9 F | BODY MASS INDEX: 23.25 KG/M2 | DIASTOLIC BLOOD PRESSURE: 61 MMHG | HEART RATE: 66 BPM | SYSTOLIC BLOOD PRESSURE: 92 MMHG | RESPIRATION RATE: 16 BRPM | WEIGHT: 129.6 LBS

## 2023-04-06 DIAGNOSIS — C34.12 MALIGNANT NEOPLASM OF UPPER LOBE OF LEFT LUNG (H): Primary | ICD-10-CM

## 2023-04-06 PROCEDURE — G0463 HOSPITAL OUTPT CLINIC VISIT: HCPCS | Performed by: RADIOLOGY

## 2023-04-06 PROCEDURE — 99213 OFFICE O/P EST LOW 20 MIN: CPT | Performed by: RADIOLOGY

## 2023-04-06 ASSESSMENT — PAIN SCALES - GENERAL: PAINLEVEL: NO PAIN (0)

## 2023-04-06 NOTE — LETTER
"    4/6/2023         RE: Zafar Spencer  [address]  [address]  [facility] [address]        Dear Colleague,    Thank you for referring your patient, Zafar Spencer, to the [facility] RADIATION ONCOLOG[facility] [place]. Please see a copy of my visit note below.    Oncology Rooming Note    April 6, 2023 2:26 PM   Zafar Spencer is a 67 year old male who presents for:    Chief Complaint   Patient presents with     Oncology Clinic Visit     Lung Cancer     Follow up     Initial Vitals: BP 92/61 (BP Location: Left arm, Patient Position: Sitting)   Pulse 66   Temp 97.9  F (36.6  C)   Resp 16   Wt 58.8 kg (129 lb 9.6 oz)   SpO2 98%   BMI 23.25 kg/m   Estimated body mass index is 23.25 kg/m  as calculated from the following:    Height as of 6/2/22: 1.59 m (5' 2.6\").    Weight as of this encounter: 58.8 kg (129 lb 9.6 oz). Body surface area is 1.61 meters squared.  No Pain (0) Comment: Data Unavailable   No LMP for male patient.  Allergies reviewed: Yes  Medications reviewed: Yes    Medications: Medication refills not needed today.  Pharmacy name entered into [facility]: [id number] [place] [address]    Clinical concerns: follow up, results of CT done 3/27/2023 Dr. Yanes was notified.      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  The patient is not a good candidate for surgery given his poor medical status.       TYPE OF RADIATION THERAPY ADMINISTERED:  4500 cGy in 5 treatments given from 6/27/2022 - 7/7/2022.     INTERVAL SINCE COMPLETION OF RADIATION THERAPY: 9 months.      SUBJECTIVE:  Mr. Spencer is a 67 year old male who presents with an asymptomatic LEFT upper lobe nodule identified on lung cancer screening CT. patient had a few screening CT chest over the past few years.  A most recent CT " chest on 2/19/2022 reviewed new irregular nodule within the left upper lobe measuring 12 x 10 x 5 mm and a second 16 x 7 x 3 mm abnormality in the right lower lobe.  This is worrisome for malignancy.  PET CT scan on 3/28/2022 showed FDG avid spiculated left upper lobe pleural-based mass suspicious for malignancy.  There is no FDG uptake in the right lower lobe lesion.  There is no radiographic evidence of lymph nodes and systemic metastasis.  Patient is not a good candidate for surgery given his current medical status. Patient received stereotactic radiosurgery for his lung cancer with a total dose of 4500 cGy in 5 treatments given from 6/27/2022 - 7/7/2022.  The patient tolerated ration therapy very well with minimal side effect.      The patient has been doing well since completion of the radiation therapy.  He denies any pain and discomfort at the time of evaluation.  He is here for routine post therapy office follow-up.     Medications were reviewed and are up to date on EPIC.    The following portions of the patient's history were reviewed and updated as appropriate: allergies, current medications, past family history, past medical history, past social history, past surgical history and problem list.    Review of Systems:      General  Constitutional  Constitutional (WDL): Exceptions to WDL  Fatigue: Fatigue relieved by rest  EENT  Eye Disorders  Eye Disorder (WDL): Assessment not pertinent to visit  Ear Disorders  Ear Disorder (WDL): Assessment not pertinent to visit  Respiratory  Respiratory  Respiratory (WDL): Exceptions to WDL  Dyspnea: Shortness of breath with moderate exertion  Cardiovascular  Cardiovascular  Cardiovascular (WDL): All cardiovascular elements are within defined limits  Gastrointestinal  Gastrointestinal  Gastrointestinal (WDL): All gastrointestinal elements are within defined limits  Musculoskeletal  Musculoskeletal and Connective Tissue Disorders  Musculoskeletal & Connective (WDL): All  musculoskeletal & connective elements are within defined limits  Integumentary  Integumentary  Integumentary (WDL): Exceptions to WDL  Pruritus: Mild or localized OR topical intervention indicated  Neurological  Neurosensory  Neurosensory (WDL): All neurosensory elements are within defined limits  Genitourinary/Reproductive  Genitourinary  Genitourinary (WDL): All genitourinary elements are within defined limits  Lymphatic  Lymph System Disorders  Lymph (WDL): All lymph elements are within defined limits  Pain  Pain Score: No Pain (0)  AUA Assessment                                                              Accompanied by  Accompanied By: family    Objective:     PHYSICAL EXAMINATION:    BP 92/61 (BP Location: Left arm, Patient Position: Sitting)   Pulse 66   Temp 97.9  F (36.6  C)   Resp 16   Wt 58.8 kg (129 lb 9.6 oz)   SpO2 98%   BMI 23.25 kg/m      Gen: Alert, in NAD  Eyes: PERRL, EOMI, sclera anicteric  Neck: Supple, full ROM, no LAD  Pulm: No wheezing, stridor or respiratory distress  CV: Well-perfused, no cyanosis, no pedal edema  Back: No step-offs or pain to palpation along the thoracolumbar spine  Rectal: Deferred  : Deferred  Musculoskeletal: Normal muscle bulk and tone  Skin: Normal color and turgor  Neurologic: A/Ox3, CN II-XII intact, normal gait and station  Psychiatric: Appropriate mood and affect     Imaging: Imaging results 30 days: CT Chest w/o contrast    Result Date: 3/27/2023  EXAM: CT CHEST W/O CONTRAST LOCATION: [facility] DATE/TIME: 3/27/2023 11:41 AM INDICATION: lung cancer post SBRT in 6 2022 COMPARISON: 11/28/2022 and prior TECHNIQUE: CT chest without IV contrast. Multiplanar reformats were obtained. Dose reduction techniques were used. CONTRAST: None. FINDINGS: LUNGS AND PLEURA: No new or enlarging suspicious nodules. Treated lesion in the subpleural region of the left upper lobe with associated fiducial markers. Upper lung predominant centrilobular  and paraseptal emphysema with biapical scarring. MEDIASTINUM/AXILLAE: No lymphadenopathy. No thoracic aortic aneurysm. CORONARY ARTERY CALCIFICATION: Mild. UPPER ABDOMEN: No significant finding. MUSCULOSKELETAL: Bilateral gynecomastia. No aggressive or destructive osseous lesions.     IMPRESSION: Posttreatment changes in the left upper lobe. No new or worsening disease.        Impression     The patient is a 67-year-old gentleman with a diagnosis of early stage lung cancer status post SBRT completed 9-month ago.  The patient has been doing well with no evidence of recurrence.    Assessment & Plan:     1.  I have reviewed his restaging CT scan and compared to the previous radiation therapy field and CT scan.  There is no evidence of cancer recurrence.  He is scheduled return to radiation oncology in 5 months for office follow-up and CT chest.     2.  Continue follow-up with Dr. Kirk Azevedo, PCP as planned    Face to face time 20 minutes with > 80% spent on consultation, education and coordination of care.       Janessa Yanes MD  Department of Radiation Oncology   [facility]  Tel: [phone]  Page: [phone]    [facility]  [address] [place] [address]     [facility]   [address] [facility] [address]   [place] [address]    CC:  Patient Care Team:  Kirk Azevedo MD as PCP - General (Student in organized health care education/training program)  Kirk Azevedo MD as Assigned PCP  Poli Harrell MD as Assigned Heart and Vascular Provider  Janessa Yanes MD as MD (Radiation Oncology)  Janessa Yanes MD as Assigned Cancer Care Provider  Gabo Winn MD as Assigned Pulmonology Provider      Again, thank you for allowing me to participate in the care of your patient.        Sincerely,        Janessa Yanes MD

## 2023-04-06 NOTE — PROGRESS NOTES
"Oncology Rooming Note    April 6, 2023 2:26 PM   Zafar Spencer is a 67 year old male who presents for:    Chief Complaint   Patient presents with     Oncology Clinic Visit     Lung Cancer     Follow up     Initial Vitals: BP 92/61 (BP Location: Left arm, Patient Position: Sitting)   Pulse 66   Temp 97.9  F (36.6  C)   Resp 16   Wt 58.8 kg (129 lb 9.6 oz)   SpO2 98%   BMI 23.25 kg/m   Estimated body mass index is 23.25 kg/m  as calculated from the following:    Height as of 6/2/22: 1.59 m (5' 2.6\").    Weight as of this encounter: 58.8 kg (129 lb 9.6 oz). Body surface area is 1.61 meters squared.  No Pain (0) Comment: Data Unavailable   No LMP for male patient.  Allergies reviewed: Yes  Medications reviewed: Yes    Medications: Medication refills not needed today.  Pharmacy name entered into [id number] [place] [address]    Clinical concerns: follow up, results of CT done 3/27/2023 Dr. Yanes was notified.      Brittany Victor RN            "

## 2023-04-06 NOTE — PROGRESS NOTES
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  The patient is not a good candidate for surgery given his poor medical status.       TYPE OF RADIATION THERAPY ADMINISTERED:  4500 cGy in 5 treatments given from 6/27/2022 - 7/7/2022.     INTERVAL SINCE COMPLETION OF RADIATION THERAPY: 9 months.      SUBJECTIVE:  Mr. Spencer is a 67 year old male who presents with an asymptomatic LEFT upper lobe nodule identified on lung cancer screening CT. patient had a few screening CT chest over the past few years.  A most recent CT chest on 2/19/2022 reviewed new irregular nodule within the left upper lobe measuring 12 x 10 x 5 mm and a second 16 x 7 x 3 mm abnormality in the right lower lobe.  This is worrisome for malignancy.  PET CT scan on 3/28/2022 showed FDG avid spiculated left upper lobe pleural-based mass suspicious for malignancy.  There is no FDG uptake in the right lower lobe lesion.  There is no radiographic evidence of lymph nodes and systemic metastasis.  Patient is not a good candidate for surgery given his current medical status. Patient received stereotactic radiosurgery for his lung cancer with a total dose of 4500 cGy in 5 treatments given from 6/27/2022 - 7/7/2022.  The patient tolerated ration therapy very well with minimal side effect.      The patient has been doing well since completion of the radiation therapy.  He denies any pain and discomfort at the time of evaluation.  He is here for routine post therapy office follow-up.     Medications were reviewed and are up to date on EPIC.    The following portions of the patient's history were reviewed and updated as appropriate: allergies, current medications, past family history, past medical history, past social history, past surgical history and problem list.    Review of Systems:       General  Constitutional  Constitutional (WDL): Exceptions to WDL  Fatigue: Fatigue relieved by rest  EENT  Eye Disorders  Eye Disorder (WDL): Assessment not pertinent to visit  Ear Disorders  Ear Disorder (WDL): Assessment not pertinent to visit  Respiratory  Respiratory  Respiratory (WDL): Exceptions to WDL  Dyspnea: Shortness of breath with moderate exertion  Cardiovascular  Cardiovascular  Cardiovascular (WDL): All cardiovascular elements are within defined limits  Gastrointestinal  Gastrointestinal  Gastrointestinal (WDL): All gastrointestinal elements are within defined limits  Musculoskeletal  Musculoskeletal and Connective Tissue Disorders  Musculoskeletal & Connective (WDL): All musculoskeletal & connective elements are within defined limits  Integumentary  Integumentary  Integumentary (WDL): Exceptions to WDL  Pruritus: Mild or localized OR topical intervention indicated  Neurological  Neurosensory  Neurosensory (WDL): All neurosensory elements are within defined limits  Genitourinary/Reproductive  Genitourinary  Genitourinary (WDL): All genitourinary elements are within defined limits  Lymphatic  Lymph System Disorders  Lymph (WDL): All lymph elements are within defined limits  Pain  Pain Score: No Pain (0)  AUA Assessment                                                              Accompanied by  Accompanied By: family    Objective:     PHYSICAL EXAMINATION:    BP 92/61 (BP Location: Left arm, Patient Position: Sitting)   Pulse 66   Temp 97.9  F (36.6  C)   Resp 16   Wt 58.8 kg (129 lb 9.6 oz)   SpO2 98%   BMI 23.25 kg/m      Gen: Alert, in NAD  Eyes: PERRL, EOMI, sclera anicteric  Neck: Supple, full ROM, no LAD  Pulm: No wheezing, stridor or respiratory distress  CV: Well-perfused, no cyanosis, no pedal edema  Back: No step-offs or pain to palpation along the thoracolumbar spine  Rectal: Deferred  : Deferred  Musculoskeletal: Normal muscle bulk and tone  Skin: Normal color and  turgor  Neurologic: A/Ox3, CN II-XII intact, normal gait and station  Psychiatric: Appropriate mood and affect     Imaging: Imaging results 30 days: CT Chest w/o contrast    Result Date: 3/27/2023  EXAM: CT CHEST W/O CONTRAST LOCATION: [facility] DATE/TIME: 3/27/2023 11:41 AM INDICATION: lung cancer post SBRT in 6 2022 COMPARISON: 11/28/2022 and prior TECHNIQUE: CT chest without IV contrast. Multiplanar reformats were obtained. Dose reduction techniques were used. CONTRAST: None. FINDINGS: LUNGS AND PLEURA: No new or enlarging suspicious nodules. Treated lesion in the subpleural region of the left upper lobe with associated fiducial markers. Upper lung predominant centrilobular and paraseptal emphysema with biapical scarring. MEDIASTINUM/AXILLAE: No lymphadenopathy. No thoracic aortic aneurysm. CORONARY ARTERY CALCIFICATION: Mild. UPPER ABDOMEN: No significant finding. MUSCULOSKELETAL: Bilateral gynecomastia. No aggressive or destructive osseous lesions.     IMPRESSION: Posttreatment changes in the left upper lobe. No new or worsening disease.        Impression     The patient is a 67-year-old gentleman with a diagnosis of early stage lung cancer status post SBRT completed 9-month ago.  The patient has been doing well with no evidence of recurrence.    Assessment & Plan:     1.  I have reviewed his restaging CT scan and compared to the previous radiation therapy field and CT scan.  There is no evidence of cancer recurrence.  He is scheduled return to radiation oncology in 5 months for office follow-up and CT chest.     2.  Continue follow-up with Dr. Kirk Azevedo, PCP as planned    Face to face time 20 minutes with > 80% spent on consultation, education and coordination of care.       Janessa Yanes MD  Department of Radiation Oncology   [facility]  Tel: [phone]  Page: [phone]    [facility]  [address] [place], [place] [id number] [place] [address]  [place] [address]    CC:  Patient Care Team:  Kirk Azevedo MD as PCP - General (Student in organized health care education/training program)  Kirk Azevedo MD as Assigned PCP  Poli Harrell MD as Assigned Heart and Vascular Provider  Janessa Yanes MD as MD (Radiation Oncology)  Janessa Yanes MD as Assigned Cancer Care Provider  Gabo Winn MD as Assigned Pulmonology Provider

## 2023-04-07 ENCOUNTER — OFFICE VISIT (OUTPATIENT)
Dept: FAMILY MEDICINE | Facility: CLINIC | Age: 68
End: 2023-04-07
Payer: COMMERCIAL

## 2023-04-07 VITALS
OXYGEN SATURATION: 99 % | HEIGHT: 62 IN | SYSTOLIC BLOOD PRESSURE: 102 MMHG | DIASTOLIC BLOOD PRESSURE: 71 MMHG | RESPIRATION RATE: 18 BRPM | HEART RATE: 66 BPM | BODY MASS INDEX: 23.55 KG/M2 | TEMPERATURE: 98 F | WEIGHT: 128 LBS

## 2023-04-07 DIAGNOSIS — C34.12 MALIGNANT NEOPLASM OF UPPER LOBE OF LEFT LUNG (H): ICD-10-CM

## 2023-04-07 DIAGNOSIS — Z72.0 SMOKING TRYING TO QUIT: Primary | ICD-10-CM

## 2023-04-07 DIAGNOSIS — I25.10 CORONARY ARTERY DISEASE INVOLVING NATIVE HEART WITHOUT ANGINA PECTORIS, UNSPECIFIED VESSEL OR LESION TYPE: ICD-10-CM

## 2023-04-07 PROCEDURE — 99213 OFFICE O/P EST LOW 20 MIN: CPT | Mod: GC | Performed by: STUDENT IN AN ORGANIZED HEALTH CARE EDUCATION/TRAINING PROGRAM

## 2023-04-07 RX ORDER — BUPROPION HYDROCHLORIDE 150 MG/1
TABLET, EXTENDED RELEASE ORAL
Qty: 55 TABLET | Refills: 0 | Status: SHIPPED | OUTPATIENT
Start: 2023-04-07 | End: 2023-04-07

## 2023-04-07 RX ORDER — BUPROPION HYDROCHLORIDE 150 MG/1
TABLET, EXTENDED RELEASE ORAL
Qty: 60 TABLET | Refills: 0 | Status: SHIPPED | OUTPATIENT
Start: 2023-04-07 | End: 2023-05-01

## 2023-04-07 NOTE — PROGRESS NOTES
CHIEF COMPLAINT                                                      Chief Complaint   Patient presents with     Follow Up     MRI result     Smoking Cessation     Would like a patch for stop smoking     Ear Problem     Possible ears infection       ASSESSMENT/PLAN:     (Z72.0) Smoking trying to quit  (primary encounter diagnosis)  Comment: Currently smoking about 3 cigarettes/day.  Motivated to quit in the setting of the below.  Wants to try patches but not covered by insurance.  We will connect him with [place] quit partner.  Discussed setting a quit date, being on Wellbutrin for at least 1 week prior.  Plan:   -nicotine (NICODERM CQ) 7 MG/24HR 24 hr patch   -[place] Quit Partner Referral - should be able to get a small supply of patches for free  -Start buPROPion (WELLBUTRIN        SR) 150 MG 12 hr tablet - given [employer] coupon - $11, which they state is affordable  -Follow-up 6 weeks    (C34.12) Malignant neoplasm of upper lobe of left lung (H)  Comment: Reviewed recent CT and oncology note from 4/6 - Currently in remission based on scan. Next f/u in 5 mo with rad onc.    (I25.10) Coronary artery disease involving native heart without angina pectoris, unspecified vessel or lesion type  Comment: requesting refill  Plan: aspirin (ASPIRIN LOW DOSE) 81 MG EC tablet        Options for treatment and follow-up care were reviewed with the patient and/or guardian. Zafar Spencer and/or guardian engaged in the decision making process and verbalized understanding of the options discussed and agreed with the final plan    Precepted with Erika Becker DO.    Kirk Azevedo MD - PGY3  [facility]      SUBJECTIVE:                                                    Zafar Spencer is a 67 year old year old male who presents to clinic today for the following health issues:    Wants to quit smoking  Smokes a few cigs per day, has decreased a lot from prio  Interested in starting something to help with  "craving  Nicotine patches helped in the past    From Dr. Yanes's oncology note yesterday:    \"   Impression      The patient is a 67-year-old gentleman with a diagnosis of early stage lung cancer status post SBRT completed 9-month ago.  The patient has been doing well with no evidence of recurrence.     Assessment & Plan:      1.  I have reviewed his restaging CT scan and compared to the previous radiation therapy field and CT scan.  There is no evidence of cancer recurrence.  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     Closed fracture of distal end of right fibula with routine healing, unspecified fracture morphology, subsequent encounter     Tobacco dependence     Malignant neoplasm of upper lobe of left lung (H)     Moderate protein-calorie malnutrition (H)     Past Surgical History:   Procedure Laterality Date     COLONOSCOPY N/A 8/16/2018    Procedure: COLONOSCOPY;  Surgeon: Alex Roche III, MD;  Location: [facility];  Service:      OTHER SURGICAL HISTORY      double hernia     OTHER SURGICAL HISTORY      hand (right)       Social History     Tobacco Use     Smoking status: Every Day     Packs/day: 1.00     Years: 52.00     Pack years: 52.00     Types: Cigarettes     Smokeless tobacco: Never     Tobacco comments:     .25 pack currently   Vaping Use     Vaping status: Not on file   Substance Use Topics     Alcohol use: No     Family History   Problem Relation Age of Onset     Diabetes Mother      Heart Disease " "Mother      Hypertension No family hx of      Coronary Artery Disease No family hx of      Cancer No family hx of          Problem list and past medical, surgical, social, and family histories reviewed & adjusted, as indicated.    Current Outpatient Medications   Medication Sig Dispense Refill     ASPIRIN LOW DOSE 81 MG EC tablet TAKE 1 TABLET BY MOUTH EVERY DAY 90 tablet 3     ciprofloxacin-dexamethasone (CIPRODEX) 0.3-0.1 % otic suspension Place 4 drops into both ears 2 times daily (Patient not taking: Reported on 3/17/2023) 7.5 mL 0     neomycin-polymyxin-hydrocortisone (CORTISPORIN) 3.5-40375-3 otic solution Place 3 drops in ear(s) 4 times daily (Patient not taking: Reported on 3/17/2023) 10 mL 0     Nutritional Supplements (BOOST HIGH PROTEIN) LIQD Take 1 Can by mouth daily (Patient not taking: Reported on 3/17/2023) 7110 mL 3     Medication list reviewed and updated as indicated.    Allergies   Allergen Reactions     Latex Rash     Penicillins Rash     rash     Allergies reviewed and updated as indicated.  ----------------------------------------------------------------------------------------------------------------------  ROS:  Constitutional, HEENT, cardiovascular, pulmonary, GI, musculoskeletal, neuro, skin, and psych systems are negative, except as otherwise noted.    OBJECTIVE:     /71   Pulse 66   Temp 98  F (36.7  C) (Oral)   Resp 18   Ht 1.575 m (5' 2\")   Wt 58.1 kg (128 lb)   SpO2 99%   BMI 23.41 kg/m    Body mass index is 23.41 kg/m .  Exam:  Constitutional: chronically ill appearing, appears older than stated age  Head: Normocephalic. Atraumatic  Neck: Neck supple. FROM  Cardiovascular: Acyanotic  Respiratory: Normal chest rise. Non-labored breathing. LCTAB  Musculoskeletal: extremities normal- no gross deformities noted, gait normal and normal muscle tone  Skin: no suspicious lesions or rashes  Neurologic: Gait normal. CN II-XII grossly intact  Psychiatric: mentation appears normal " and affect normal/bright

## 2023-04-07 NOTE — PATIENT INSTRUCTIONS
Quit-Smoking Tools: Help for Kicking Your Habit    If you're a smoker and you need some more reasons to quit, look at what the [place]. Surgeon General has to say: If you stop now, you'll have a better quality of life and more years to live it.   As you likely already know, quitting smoking isn't easy. But millions of other people have done it. And you can, too. Quit-smoking aids, such as those listed here, can increase your chance of success:   Quit lines. When you call a quit line, you can talk with someone who's trained to help people quit smoking. It's free. And you can call almost any time. Find a quit line by calling the [employer].  Nicotine patches. They give you a measured dose of nicotine through your skin to fight cravings. And you can buy patches without a prescription. Several types and strengths are available. The 1 you choose depends on your body size. And it also depends on how much you smoked. Try to slowly decrease your dose. These patches have been known to cause trouble sleeping. If this happens to you, remove the patch before going to bed. Replace it when you wake up.  Nicotine gum. This fast-acting form of nicotine replacement doesn't need a prescription. And it comes in 2 strengths: 2 mg and 4 mg. Chew the gum slowly until it tastes peppery. Then place the gum against your cheek. Switch between chewing it and placing it next to your cheek. Do this for about 20 to 30 minutes. But don't eat or drink anything when using the gum. This reduces nicotine absorption. Scheduling your doses throughout the day may work better to calm your cravings.  Nicotine nasal spray. A prescription nasal spray sends nicotine quickly to the bloodstream. So, it eases withdrawal symptoms right away. The spray offers a sense of control over your cravings. Most smokers using it report great results. But it can cause sneezing and watery eyes because it tastes peppery. The FDA advises using it for up to 6  months only.  Nicotine inhalers. Using this prescription device is like smoking a cigarette. When you puff on the inhaler, a cartridge inside the plastic tube gives off nicotine. But the medicine doesn't go into your lungs. It's delivered to your mouth for quick absorption.  Nicotine lozenges. These over-the-counter lozenges also are available in 2-mg and 4-mg strengths. You decide which dose to take. This is based on when you often had your first cigarette of the day. You'll absorb less nicotine if you eat or drink while using a lozenge.  Bupropion. This non-nicotine prescription medicine affects chemicals that are responsible for cravings. So, it reduces withdrawal symptoms. It has the active ingredient bupropion. This is used as an antidepressant. You can use it alone or with nicotine-replacement therapy.  Varenicline. This oral prescription medicine reduces nicotine withdrawal symptoms. It also decreases the pleasure you get from smoking. Side effects can include changes in mood or behavior. It's important to use this medicine under medical supervision.  Quit-smoking aids can help you have a smoke-free future. But it's also smart to develop a plan to change your personal habits. And to set up a network of emotional support. Turn to family and friends, and your healthcare provider. They can give you valuable information on quitting. Also go to www.smokefree.gov for support and tips.     1360-1972 [employer]. All rights reserved. This information is not intended as a substitute for professional medical care. Always follow your healthcare professional's instructions.    Quit Partner referral faxed to location, they will call patient to schedule.

## 2023-05-01 DIAGNOSIS — Z72.0 SMOKING TRYING TO QUIT: ICD-10-CM

## 2023-05-01 RX ORDER — BUPROPION HYDROCHLORIDE 150 MG/1
150 TABLET, EXTENDED RELEASE ORAL 2 TIMES DAILY
Qty: 60 TABLET | Refills: 1 | Status: SHIPPED | OUTPATIENT
Start: 2023-05-01 | End: 2023-06-05

## 2023-05-19 ENCOUNTER — HOSPITAL ENCOUNTER (OUTPATIENT)
Dept: GENERAL RADIOLOGY | Facility: HOSPITAL | Age: 68
Discharge: HOME | End: 2023-05-19
Attending: FAMILY MEDICINE
Payer: COMMERCIAL

## 2023-05-19 ENCOUNTER — OFFICE VISIT (OUTPATIENT)
Dept: FAMILY MEDICINE | Facility: CLINIC | Age: 68
End: 2023-05-19
Payer: COMMERCIAL

## 2023-05-19 ENCOUNTER — HOSPITAL ENCOUNTER (OUTPATIENT)
Dept: GENERAL RADIOLOGY | Facility: HOSPITAL | Age: 68
Discharge: HOME | End: 2023-05-19
Attending: FAMILY MEDICINE | Admitting: FAMILY MEDICINE
Payer: COMMERCIAL

## 2023-05-19 VITALS
TEMPERATURE: 97.8 F | DIASTOLIC BLOOD PRESSURE: 73 MMHG | RESPIRATION RATE: 16 BRPM | BODY MASS INDEX: 23.59 KG/M2 | SYSTOLIC BLOOD PRESSURE: 115 MMHG | OXYGEN SATURATION: 97 % | HEART RATE: 63 BPM | WEIGHT: 129 LBS

## 2023-05-19 DIAGNOSIS — M79.604 LUMBAR PAIN WITH RADIATION DOWN RIGHT LEG: ICD-10-CM

## 2023-05-19 DIAGNOSIS — M54.50 LUMBAR PAIN WITH RADIATION DOWN RIGHT LEG: ICD-10-CM

## 2023-05-19 DIAGNOSIS — M53.3 PAIN IN THE COCCYX: ICD-10-CM

## 2023-05-19 DIAGNOSIS — M53.3 PAIN IN THE COCCYX: Primary | ICD-10-CM

## 2023-05-19 PROCEDURE — 72100 X-RAY EXAM L-S SPINE 2/3 VWS: CPT

## 2023-05-19 PROCEDURE — 72220 X-RAY EXAM SACRUM TAILBONE: CPT

## 2023-05-19 PROCEDURE — 99213 OFFICE O/P EST LOW 20 MIN: CPT | Performed by: FAMILY MEDICINE

## 2023-05-19 RX ORDER — CYCLOBENZAPRINE HCL 5 MG
5 TABLET ORAL 3 TIMES DAILY PRN
Qty: 30 TABLET | Refills: 1 | Status: SHIPPED | OUTPATIENT
Start: 2023-05-19 | End: 2023-09-12

## 2023-05-19 NOTE — PROGRESS NOTES
Patient presents with:  Back Injury: X yesterday Lower back to tailbone pain.      Clinical Decision Making:      ICD-10-CM    1. Pain in the coccyx  M53.3 XR Lumbar Spine 2/3 Views     XR Sacrum and Coccyx 2 Views     cyclobenzaprine (FLEXERIL) 5 MG tablet     Spine  Referral      2. Lumbar pain with radiation down right leg  M54.50 XR Lumbar Spine 2/3 Views    M79.604 XR Sacrum and Coccyx 2 Views     cyclobenzaprine (FLEXERIL) 5 MG tablet     Spine  Referral        XR lumbar/coccyx ordered and personally reviewed. Formal radiology reads reviewed as well. No acute injury noted. Will treat with flexeril and advised pt to do tylenol/ibuprofen at home and heating pad to help with pain. There's radicular pain down right leg. Will send him to spine specialist for further evaluation. Pt agreed with plan. Of note he had lung cancer in remission and follows with pulmonology, no red flag symptoms today.     There are no Patient Instructions on file for this visit.    HPI:  Zafar Spencer is a 67 year old male who presents today complaining of   Chief Complaint   Patient presents with     Back Injury     X yesterday Lower back to tailbone pain.     Lifted a heavy laundry basket and when he lifted up the heavy basket felt a pop in the low back/tailbone area.    History obtained from the patient.    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  2018-08: Conductive hearing loss, bilateral  2018-08: H/O colonoscopy  2018-08: HLD (hyperlipidemia)  2018-01: Pulmonary emphysema, unspecified emphysema type (H)  2017-12: Pulmonary nodules  2017-12: Benign prostatic hyperplasia with urinary frequency  2017-11: Cat bite  2017-11: Cellulitis of  hand  2017-07: Tobacco use  2017-07: Weight loss  Tobacco dependence      Past Medical History:   Diagnosis Date     Bronchitis      Pulmonary nodules      Tobacco dependence        Social History     Tobacco Use     Smoking status: Every Day     Packs/day: 1.00     Years: 52.00     Pack years: 52.00     Types: Cigarettes     Smokeless tobacco: Never     Tobacco comments:     .25 pack currently   Vaping Use     Vaping status: Not on file   Substance Use Topics     Alcohol use: No       Review of Systems  Constitutional, HEENT, cardiovascular, pulmonary, gi and gu systems are negative, except as otherwise noted.    Vitals:    05/19/23 1520   BP: 115/73   Pulse: 63   Resp: 16   Temp: 97.8  F (36.6  C)   TempSrc: Oral   SpO2: 97%   Weight: 58.5 kg (129 lb)       Physical Exam  GENERAL: healthy, alert and no distress  MS: TTP midline lumbar around L5-S1 all the way down to tailbone, straight leg raise positive on right side  NEURO: Normal strength and tone, mentation intact and speech normal  PSYCH: mentation appears normal, affect normal/bright    Results:  No results found for any visits on 05/19/23.      At the end of the encounter, I discussed results, diagnosis, medications. Discussed red flags for immediate return to clinic/ER, as well as indications for follow up if no improvement. Patient understood and agreed to plan. Patient was stable for discharge.

## 2023-05-21 ENCOUNTER — APPOINTMENT (OUTPATIENT)
Dept: RADIOLOGY | Facility: HOSPITAL | Age: 68
End: 2023-05-21
Attending: EMERGENCY MEDICINE
Payer: COMMERCIAL

## 2023-05-21 ENCOUNTER — HOSPITAL ENCOUNTER (EMERGENCY)
Facility: HOSPITAL | Age: 68
Discharge: HOME | End: 2023-05-21
Attending: EMERGENCY MEDICINE | Admitting: EMERGENCY MEDICINE
Payer: COMMERCIAL

## 2023-05-21 VITALS
TEMPERATURE: 98.2 F | OXYGEN SATURATION: 96 % | RESPIRATION RATE: 16 BRPM | SYSTOLIC BLOOD PRESSURE: 110 MMHG | WEIGHT: 127 LBS | BODY MASS INDEX: 21.68 KG/M2 | HEIGHT: 64 IN | HEART RATE: 73 BPM | DIASTOLIC BLOOD PRESSURE: 71 MMHG

## 2023-05-21 DIAGNOSIS — S39.012A STRAIN OF LUMBAR PARASPINOUS MUSCLE, INITIAL ENCOUNTER: ICD-10-CM

## 2023-05-21 PROCEDURE — 72100 X-RAY EXAM L-S SPINE 2/3 VWS: CPT

## 2023-05-21 PROCEDURE — 250N000013 HC RX MED GY IP 250 OP 250 PS 637: Performed by: EMERGENCY MEDICINE

## 2023-05-21 PROCEDURE — 99283 EMERGENCY DEPT VISIT LOW MDM: CPT

## 2023-05-21 RX ORDER — OXYCODONE AND ACETAMINOPHEN 5; 325 MG/1; MG/1
1 TABLET ORAL EVERY 6 HOURS PRN
Qty: 12 TABLET | Refills: 0 | Status: SHIPPED | OUTPATIENT
Start: 2023-05-21 | End: 2023-05-24

## 2023-05-21 RX ORDER — OXYCODONE AND ACETAMINOPHEN 5; 325 MG/1; MG/1
1 TABLET ORAL ONCE
Status: COMPLETED | OUTPATIENT
Start: 2023-05-21 | End: 2023-05-21

## 2023-05-21 RX ORDER — IBUPROFEN 600 MG/1
600 TABLET, FILM COATED ORAL EVERY 6 HOURS PRN
Qty: 28 TABLET | Refills: 0 | Status: SHIPPED | OUTPATIENT
Start: 2023-05-21 | End: 2023-06-21

## 2023-05-21 RX ORDER — IBUPROFEN 600 MG/1
600 TABLET, FILM COATED ORAL ONCE
Status: COMPLETED | OUTPATIENT
Start: 2023-05-21 | End: 2023-05-21

## 2023-05-21 RX ADMIN — IBUPROFEN 600 MG: 600 TABLET, FILM COATED ORAL at 12:51

## 2023-05-21 RX ADMIN — OXYCODONE HYDROCHLORIDE AND ACETAMINOPHEN 1 TABLET: 5; 325 TABLET ORAL at 12:51

## 2023-05-21 ASSESSMENT — ENCOUNTER SYMPTOMS
ROS GI COMMENTS: NEGATIVE FOR LOSS OF BOWEL CONTROL.
BACK PAIN: 1
DIZZINESS: 0

## 2023-05-21 ASSESSMENT — ACTIVITIES OF DAILY LIVING (ADL): ADLS_ACUITY_SCORE: 35

## 2023-05-21 NOTE — ED TRIAGE NOTES
Patient reports that he slipped yesterday and landed on his buttock. He now has lower back pain and was barely able to get out of bed today. He rates his pain 8 out of 10.  He took a muscle relaxer today. He denies any other injuries related to the fall. No LOC and he did not hit his head.      Triage Assessment       Row Name 05/21/23 [id number]       Triage Assessment (Adult)    Airway WDL WDL       Respiratory WDL    Respiratory WDL WDL       Cardiac WDL    Cardiac WDL WDL

## 2023-05-21 NOTE — DISCHARGE INSTRUCTIONS
Avoid heavy bending, lifting, or twisting for the next week.  Ice or heat off-and-on to low back areas of pain can help.  Ibuprofen as prescribed with food.  1 Percocet every 6 hours if needed for stronger pain.  Do not drive with this.  Have someone drive you home today.  See your clinic for follow-up in the next 1 to 2 weeks.  See them sooner if worse or problems.

## 2023-05-21 NOTE — ED PROVIDER NOTES
EMERGENCY DEPARTMENT ENCOUNTER      NAME: Zafar Spencer  AGE: 67 year old male  YOB: 1955  MRN: [id number]  EVALUATION DATE & TIME: No admission date for patient encounter.    PCP: Kirk Azevedo    ED PROVIDER: Man Alcala M.D.      Chief Complaint   Patient presents with     Back Pain         FINAL IMPRESSION:  1.  Acute fall.  2.  Acute lumbar back muscle strain.      ED COURSE & MEDICAL DECISION MAKIN:37 PM I met with the patient to gather history and to perform my initial exam. We discussed plans for the ED course, including diagnostic testing and treatment. PPE worn: cloth mask.  Patient with low back pain after moving a laundry basket on the .  Saw his doctor on the .  Lumbar spine films and sacral coccygeal films did not show anything acute.  Yesterday he slipped and landed on his buttocks.  Not complaining of exacerbation of his low back pain.  Denies loss of bowel or bladder control, urinary incontinence, incomplete bladder emptying, perianal anesthesia.  Has not taken anything for the pain.  1:40 PM I rechecked and updated the patient and his family member on test results.  X-ray showing extensive degenerative changes but no acute fracture.  Patient feeling much better after medications.  Discharged home with prescriptions for the same.  Patient and family in agreement with the plan.      Pertinent Labs & Imaging studies reviewed. (See chart for details)      67 year old male presents to the Emergency Department for evaluation of low back pain after a fall.    At the conclusion of the encounter I discussed the results of all of the tests and the disposition. The questions were answered. The patient or family acknowledged understanding and was agreeable with the care plan.            Medical Decision Making    History:    Supplemental history from: Family Member    External Record(s) reviewed: Outpatient Record both inpatient and outpatient computer records  reviewed.    Work Up:    Chart documentation includes differential considered and any EKGs or imaging independently interpreted by provider, where specified.  Differential diagnosis includes lumbar strain, muscle strain, contusion, fracture, etc.    In additional to work up documented, I considered the following work up: Documented in chart, if applicable.    External consultation:    Discussion of management with another provider: Documented in chart, if applicable    Complicating factors:    Care impacted by chronic illness: N/A    Care affected by social determinants of health: N/A    Disposition considerations: Anticipate discharge home.         MEDICATIONS GIVEN IN THE EMERGENCY:  Medications   ibuprofen (ADVIL/MOTRIN) tablet 600 mg (600 mg Oral $Given 5/21/23 1251)   oxyCODONE-acetaminophen (PERCOCET) 5-325 MG per tablet 1 tablet (1 tablet Oral $Given 5/21/23 1251)       NEW PRESCRIPTIONS STARTED AT TODAY'S ER VISIT  New Prescriptions    No medications on file          =================================================================    HPI    Patient information was obtained from: patient and family member    Use of : N/A         Zafar Spencer is a 67 year old male with a pertinent history of lung cancer, HLD, and tobacco use disorder, who presents to this ED via private vehicle with family member for evaluation of back pain.     Per chart review, patient was seen at [facility] on 5/19/2023 (2 days ago) for evaluation of lower back pain with radiation down the right lower extremity that began after lifting a heavy laundry basket. Lumbar spine XR showed minor thoracolumbar dextrocurvature, minor grade 1 anterolisthesis at L4-L5, and minor lower lumbar facet arthropathy without fracture or subluxation. XR sacrum and coccyx was unremarkable. Patient was discharged with a prescription for Flexeril 5 mg TID prn and spine specialist referral.     Patient reports he slipped and fell directly onto  his buttocks yesterday with associated 8/10 lower back pain. He has been taking Flexeril without improvement. Denies any head injury or loss of consciousness associated with the fall. Denies dizziness or loss of bowel or bladder control. He does not identify any waxing or waning symptoms otherwise, exacerbating or alleviating features, associated symptoms except as mentioned.       REVIEW OF SYSTEMS   Review of Systems   Gastrointestinal:        Negative for loss of bowel control.   Genitourinary:        Negative for loss of bladder control.   Musculoskeletal: Positive for back pain (lower).   Neurological: Negative for dizziness.   All other systems reviewed and are negative.       PAST MEDICAL HISTORY:  Past Medical History:   Diagnosis Date     Bronchitis      Pulmonary nodules      Tobacco dependence        PAST SURGICAL HISTORY:  Past Surgical History:   Procedure Laterality Date     COLONOSCOPY N/A 8/16/2018    Procedure: COLONOSCOPY;  Surgeon: Alex Roche III, MD;  Location: [facility];  Service:      OTHER SURGICAL HISTORY      double hernia     OTHER SURGICAL HISTORY      hand (right)           CURRENT MEDICATIONS:    aspirin (ASPIRIN LOW DOSE) 81 MG EC tablet  buPROPion (WELLBUTRIN SR) 150 MG 12 hr tablet  cyclobenzaprine (FLEXERIL) 5 MG tablet  nicotine (NICODERM CQ) 7 MG/24HR 24 hr patch  Nutritional Supplements (BOOST HIGH PROTEIN) LIQD        ALLERGIES:  Allergies   Allergen Reactions     Latex Rash     Penicillins Rash     rash       FAMILY HISTORY:  Family History   Problem Relation Age of Onset     Diabetes Mother      Heart Disease Mother      Hypertension No family hx of      Coronary Artery Disease No family hx of      Cancer No family hx of        SOCIAL HISTORY:   Social History     Socioeconomic History     Marital status: Single   Occupational History     Occupation: [employer]   Tobacco Use     Smoking status: Every Day     Packs/day: 1.00     Years: 52.00     Pack years: 52.00      "Types: Cigarettes     Smokeless tobacco: Never     Tobacco comments:     .25 pack currently   Substance and Sexual Activity     Alcohol use: No     Drug use: Yes     Frequency: 2.0 times per week     Types: Marijuana   Daily tobacco marijuana use.  No alcohol.    VITALS:  /74   Pulse 96   Temp 98.2  F (36.8  C)   Resp 16   Ht 1.626 m (5' 4\")   Wt 57.6 kg (127 lb)   SpO2 96%   BMI 21.80 kg/m      PHYSICAL EXAM    Vital Signs:  /74   Pulse 96   Temp 98.2  F (36.8  C)   Resp 16   Ht 1.626 m (5' 4\")   Wt 57.6 kg (127 lb)   SpO2 96%   BMI 21.80 kg/m    General:  On entering the room is in no apparent distress.    Neck:  Neck supple with full range of motion and nontender.    Back:  Back and spine are nontender except for mild to moderate tenderness in the lumbar spine to palpation.  No palpable fracture, crepitance or step-off..  No costovertebral angle tenderness.    HEENT:  Oropharynx clear with moist mucous membranes.  HEENT unremarkable.    Pulmonary:  Chest clear to auscultation without rhonchi rales or wheezing.    Cardiovascular:  Cardiac regular rate and rhythm without murmurs rubs or gallops.    Abdomen:  Abdomen soft nontender.  There is no rebound or guarding.    Muskuloskeletal:  he moves all 4 without any difficulty and has normal neurovascular exams.  Extremities without clubbing, cyanosis, or edema.  Legs and calves are nontender.    Neuro:  he is alert and oriented ×3 and moves all extremities symmetrically.  Strength 5/5 in all 4 extremities.  Sensation intact in all 4 extremities.  Psych:  Normal affect.    Skin:  Unremarkable and warm and dry.       LAB:  All pertinent labs reviewed and interpreted.  Labs Ordered and Resulted from Time of ED Arrival to Time of ED Departure - No data to display    RADIOLOGY:  Reviewed all pertinent imaging. Please see official radiology report.  Lumbar spine XR, 2-3 views   Final Result   IMPRESSION: Five lumbar-type vertebral bodies. Similar " mild thoracolumbar dextroscoliosis, apex at T12-L1. Similar 1-2 mm grade 1 anterolisthesis at L4-L5 with straightening of the normal lordosis. No interval spondylolisthesis. Maintained vertebral body    heights. No acute displaced fracture. Similar mild multilevel interbody degenerative change and facet arthrosis, most notably at L4-L5. Moderate atherosclerotic calcifications within the abdomen. Clear visualized lung bases. A surgical clip within the    left lower thoracic paraspinal region.                     I, Nedra Carpio, am serving as a scribe to document services personally performed by Dr. Alcala based on my observation and the provider's statements to me. I, Man Alcala MD attest that Nedra Carpio is acting in a scribe capacity, has observed my performance of the services and has documented them in accordance with my direction.    Man Alcala M.D.  Emergency Medicine  [facility] EMERGENCY DEPARTMENT  [address] [place] [id number] [phone]  Dept: [phone]      Man Alcala MD  05/21/23 7400

## 2023-05-22 ENCOUNTER — OFFICE VISIT (OUTPATIENT)
Dept: FAMILY MEDICINE | Facility: CLINIC | Age: 68
End: 2023-05-22
Payer: COMMERCIAL

## 2023-05-22 VITALS
HEIGHT: 64 IN | WEIGHT: 126.75 LBS | HEART RATE: 65 BPM | RESPIRATION RATE: 18 BRPM | TEMPERATURE: 97 F | BODY MASS INDEX: 21.64 KG/M2 | SYSTOLIC BLOOD PRESSURE: 104 MMHG | DIASTOLIC BLOOD PRESSURE: 68 MMHG | OXYGEN SATURATION: 100 %

## 2023-05-22 DIAGNOSIS — I25.10 CORONARY ARTERY DISEASE INVOLVING NATIVE HEART WITHOUT ANGINA PECTORIS, UNSPECIFIED VESSEL OR LESION TYPE: ICD-10-CM

## 2023-05-22 DIAGNOSIS — M54.50 LUMBAR BACK PAIN: ICD-10-CM

## 2023-05-22 DIAGNOSIS — Z72.0 SMOKING TRYING TO QUIT: Primary | ICD-10-CM

## 2023-05-22 PROCEDURE — 99214 OFFICE O/P EST MOD 30 MIN: CPT | Mod: GC | Performed by: STUDENT IN AN ORGANIZED HEALTH CARE EDUCATION/TRAINING PROGRAM

## 2023-05-22 RX ORDER — CYCLOBENZAPRINE HCL 5 MG
5 TABLET ORAL 3 TIMES DAILY PRN
Qty: 30 TABLET | Status: CANCELLED | OUTPATIENT
Start: 2023-05-22

## 2023-05-22 RX ORDER — BUPROPION HYDROCHLORIDE 150 MG/1
150 TABLET, EXTENDED RELEASE ORAL 2 TIMES DAILY
Qty: 60 TABLET | Status: CANCELLED | OUTPATIENT
Start: 2023-05-22

## 2023-05-22 RX ORDER — NAPROXEN 500 MG/1
500 TABLET ORAL 2 TIMES DAILY WITH MEALS
Qty: 28 TABLET | Refills: 0 | Status: SHIPPED | OUTPATIENT
Start: 2023-05-22 | End: 2023-07-27

## 2023-05-22 NOTE — PROGRESS NOTES
CHIEF COMPLAINT                                                      Chief Complaint   Patient presents with     Follow Up     Smoking      Fall     Fell and hurts back, Went to ED yesterday        ASSESSMENT/PLAN:     (Z72.0) Smoking trying to quit  (primary encounter diagnosis)  Comment: Mild benefit from wellbutrin though unchanged cigarettes/per day (2-3). Has not set a quit date and isn't using patches. Time to first cig 20 minutes after awakening. States it's a lot of habit, though declines psychotherapy.  Plan:   -Discussed setting a quit date in 1-2 weeks. Continue wellbutrin until then and for first week or so after quitting, longer if needed. Start replacing nicotine with patch (has some at home) when stops smoking  -Placed quit plan referral again, mostly for more free NRT, as they don't have medicare part D and can't afford medications well  -Emphasized again the importance of quitting given his h/ of lung CA now in remission (f/u with rad onc in September)    (M54.50) Lumbar back pain  Comment: UR visit 5/19 and ER visit 5/21 for this problem. R/t picking up a heavy laundry bin and then a subsequent mechanical fall in the home. Xrays with some mild to moderate degenerative changes, most notably b/l facet arthrosis and minor 1-2mm anterolisthesis at L4-L5. No fractures or dislocations. Denies any radicular symptoms or red flags, so I do not feel advanced imaging is necessary, as it would not change our plan. Will give 2 week course of NSAIDs and send with HEP stretches.   Plan:   -naproxen (NAPROSYN) 500 MG tablet x 2 weeks  -HEP  -F/u 4 weeks    (I25.10) Coronary artery disease involving native heart without angina pectoris, unspecified vessel or lesion type  Comment: Requesting refill of chronic med. Never had a heart event, just incidental mild CAD visualized on imaging. Counseled on holding ASA while on naproxen.  Plan: aspirin (ASA) 81 MG EC tablet          Options for treatment and follow-up care  were reviewed with the patient and/or guardian. Zafar Spencer and/or guardian engaged in the decision making process and verbalized understanding of the options discussed and agreed with the final plan    Precepted with Edita Jung MD.    Kirk Azevedo MD - PGY3  [facility]      SUBJECTIVE:                                                    Zafar Spencer is a 67 year old year old male who presents to clinic today for the following health issues:    F/u smoking  Wellbutrin was helping only a little bit to cut down on smoking  Got some nicotine patches from quit plan  Smoking about 2-3 cigs per day, which is stable    Low back pain  Radiating down backs of legs  No paraesthesias, no weakness  Lifting over head worsens it, getting out of bed too  Walking seems to help  Midline low back near waist line  Stabbing pain; 8/10 at its worst  Has been worsened for about a week  No saddle anesthesia, no incontinence    ----------------------------------------------------------------------------------------------------------------------  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     Closed fracture of distal end of right fibula with routine healing, unspecified fracture morphology, subsequent encounter     Tobacco dependence     Malignant neoplasm of upper lobe of left lung (H)     Moderate protein-calorie malnutrition (H)     Past Surgical History:   Procedure Laterality Date     COLONOSCOPY N/A 8/16/2018    Procedure: COLONOSCOPY;  Surgeon: Alex Roche III, MD;  Location: [facility];  Service:      OTHER SURGICAL HISTORY      double hernia     OTHER SURGICAL HISTORY      hand (right)       Social History      Tobacco Use     Smoking status: Every Day     Packs/day: 1.00     Years: 52.00     Pack years: 52.00     Types: Cigarettes     Smokeless tobacco: Never     Tobacco comments:     .25 pack currently   Vaping Use     Vaping status: Not on file   Substance Use Topics     Alcohol use: No     Family History   Problem Relation Age of Onset     Diabetes Mother      Heart Disease Mother      Hypertension No family hx of      Coronary Artery Disease No family hx of      Cancer No family hx of          Problem list and past medical, surgical, social, and family histories reviewed & adjusted, as indicated.    Current Outpatient Medications   Medication Sig Dispense Refill     aspirin (ASPIRIN LOW DOSE) 81 MG EC tablet Take 1 tablet (81 mg) by mouth daily 90 tablet 3     buPROPion (WELLBUTRIN SR) 150 MG 12 hr tablet Take 1 tablet (150 mg) by mouth 2 times daily for 60 days 60 tablet 1     cyclobenzaprine (FLEXERIL) 5 MG tablet Take 1 tablet (5 mg) by mouth 3 times daily as needed for muscle spasms 30 tablet 1     ibuprofen (ADVIL/MOTRIN) 600 MG tablet Take 1 tablet (600 mg) by mouth every 6 hours as needed for moderate pain 28 tablet 0     nicotine (NICODERM CQ) 7 MG/24HR 24 hr patch Place 1 patch onto the skin every 24 hours (Patient not taking: Reported on 5/19/2023) 14 patch 3     Nutritional Supplements (BOOST HIGH PROTEIN) LIQD Take 1 Can by mouth daily (Patient not taking: Reported on 3/17/2023) 7110 mL 3     oxyCODONE-acetaminophen (PERCOCET) 5-325 MG tablet Take 1 tablet by mouth every 6 hours as needed for pain 12 tablet 0     Medication list reviewed and updated as indicated.    Allergies   Allergen Reactions     Latex Rash     Penicillins Rash     rash     Allergies reviewed and updated as indicated.  ----------------------------------------------------------------------------------------------------------------------  ROS:  Constitutional, HEENT, cardiovascular, pulmonary, GI, musculoskeletal, neuro, skin, and  "psych systems are negative, except as otherwise noted.    OBJECTIVE:     /68   Pulse 65   Temp 97  F (36.1  C) (Tympanic)   Resp 18   Ht 1.626 m (5' 4\")   Wt 57.5 kg (126 lb 12 oz)   SpO2 100%   BMI 21.76 kg/m    Body mass index is 21.76 kg/m .  Exam:  Constitutional: healthy, alert and no distress  Head: Normocephalic. Atraumatic  Neck: Neck supple. FROM  Cardiovascular: Acyanotic  Respiratory: Normal chest rise. Non-labored breathing.  Musculoskeletal: significant difficulty getting on the exam table d/t severe midline low back pain that is moderately ttp near the waistline ~L4/L5. No paraspinal ttp. Negative straight leg raise b/l  Skin: no suspicious lesions or rashes  Neurologic: Gait normal. CN II-XII grossly intact  Psychiatric: mentation appears normal and affect normal/bright    ----    EXAM: XR LUMBAR SPINE 2/3 VIEWS  LOCATION: [facility]  DATE/TIME: 5/21/2023 1:10 PM CDT     INDICATION: Fall, trauma.  COMPARISON: 05/19/2023 radiographs.                                                                      IMPRESSION: Five lumbar-type vertebral bodies. Similar mild thoracolumbar dextroscoliosis, apex at T12-L1. Similar 1-2 mm grade 1 anterolisthesis at L4-L5 with straightening of the normal lordosis. No interval spondylolisthesis. Maintained vertebral body   heights. No acute displaced fracture. Similar mild multilevel interbody degenerative change and facet arthrosis, most notably at L4-L5. Moderate atherosclerotic calcifications within the abdomen. Clear visualized lung bases. A surgical clip within the   left lower thoracic paraspinal region.      "

## 2023-05-22 NOTE — PROGRESS NOTES
Faculty Supervision of Residents   I have examined this patient and the medical care has been evaluated and discussed with the resident. See resident note outlining our discussion.      Cheryl Jung MD

## 2023-06-05 DIAGNOSIS — Z72.0 SMOKING TRYING TO QUIT: ICD-10-CM

## 2023-06-05 RX ORDER — BUPROPION HYDROCHLORIDE 150 MG/1
150 TABLET, EXTENDED RELEASE ORAL 2 TIMES DAILY
Qty: 60 TABLET | Refills: 0 | Status: SHIPPED | OUTPATIENT
Start: 2023-06-05 | End: 2023-06-21

## 2023-06-13 ENCOUNTER — PATIENT OUTREACH (OUTPATIENT)
Dept: ONCOLOGY | Facility: CLINIC | Age: 68
End: 2023-06-13
Payer: COMMERCIAL

## 2023-06-13 NOTE — PROGRESS NOTES
[facility]: Cancer Care                                                                                          A copy of patient's cancer treatment summary was placed in the mail today. Will plan to follow-up with patient in 1-2 weeks to review, address any needs, and answer questions.     Fidelina Hawk, RN, BSN, OCN  Cancer Survivorship Nurse Coordinator

## 2023-06-21 ENCOUNTER — OFFICE VISIT (OUTPATIENT)
Dept: FAMILY MEDICINE | Facility: CLINIC | Age: 68
End: 2023-06-21
Payer: COMMERCIAL

## 2023-06-21 VITALS
DIASTOLIC BLOOD PRESSURE: 67 MMHG | RESPIRATION RATE: 16 BRPM | WEIGHT: 122.5 LBS | HEART RATE: 86 BPM | SYSTOLIC BLOOD PRESSURE: 91 MMHG | BODY MASS INDEX: 22.54 KG/M2 | HEIGHT: 62 IN | OXYGEN SATURATION: 100 %

## 2023-06-21 DIAGNOSIS — Z72.0 SMOKING TRYING TO QUIT: Primary | ICD-10-CM

## 2023-06-21 DIAGNOSIS — L30.8 OTHER ECZEMA: ICD-10-CM

## 2023-06-21 PROCEDURE — 99214 OFFICE O/P EST MOD 30 MIN: CPT | Mod: GC | Performed by: STUDENT IN AN ORGANIZED HEALTH CARE EDUCATION/TRAINING PROGRAM

## 2023-06-21 RX ORDER — BUPROPION HYDROCHLORIDE 150 MG/1
150 TABLET, EXTENDED RELEASE ORAL 2 TIMES DAILY
Qty: 60 TABLET | Refills: 1 | Status: SHIPPED | OUTPATIENT
Start: 2023-06-21 | End: 2023-07-10

## 2023-06-21 RX ORDER — TRIAMCINOLONE ACETONIDE 1 MG/G
CREAM TOPICAL 2 TIMES DAILY
Qty: 80 G | Refills: 1 | Status: SHIPPED | OUTPATIENT
Start: 2023-06-21 | End: 2023-11-01

## 2023-06-21 NOTE — PROGRESS NOTES
Preceptor Attestation:   Patient seen, evaluated and discussed with the resident. I have verified the content of the note, which accurately reflects my assessment of the patient and the plan of care.    Supervising Physician:Rita Kunz MD    [facility]

## 2023-06-21 NOTE — PATIENT INSTRUCTIONS
Dental Clinics:     [facility]      [address] [place] [address]   [phone]        [facility]     [address] [address]        [facility] (Dental)     [address] [place] [address]   [phone]        [facility]     [address] [place] [id number]   [phone]

## 2023-06-21 NOTE — PROGRESS NOTES
CHIEF COMPLAINT                                                      Chief Complaint   Patient presents with     Follow Up     Back pain , feel better      Eczema       ASSESSMENT/PLAN:     (Z72.0) Smoking trying to quit  (primary encounter diagnosis)  Comment: Requesting refill of Wellbutrin for smoking cessation.  Down to 1 cigarette daily.  Reiterated importance of exploring productive alternatives to reaching for a cigarette when he has the urge, including meditation, going for a walk, planning his next fishing trip, etc.  Plan: buPROPion (WELLBUTRIN SR) 150 MG 12 hr tablet    (L30.8) Other eczema  Comment: On the extensor elbows, currently with mild flare.  Plan:  -triamcinolone (KENALOG) 0.1 % external cream for flares; counseled on avoiding prolonged use longer than 2 weeks  -Counseled on twice daily moisturizing for baseline control      Options for treatment and follow-up care were reviewed with the patient and/or guardian. Zafar Spencer and/or guardian engaged in the decision making process and verbalized understanding of the options discussed and agreed with the final plan    Precepted with Dr. Kunz.    Kirk Azevedo MD - PGY3  [facility]      SUBJECTIVE:                                                    Zafar Spencer is a 67 year old year old male who presents to clinic today for the following health issues:    F/u back  Pain is mostly gone  Gets twinges when he does certain movements like bending down to grab the laundry    Dry skin on arms  Eczema as a kid  Recent flare on left arm  Very itchy and red    Wants wellbutrin refill for smoking  Down to 1 cig daily    ----------------------------------------------------------------------------------------------------------------------  Patient Active Problem List   Diagnosis     Tobacco use     Pulmonary nodules     Benign prostatic hyperplasia with urinary frequency     Pulmonary emphysema, unspecified emphysema type  (H)     HLD (hyperlipidemia)     H/O colonoscopy     Conductive hearing loss, bilateral     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;  Service:      OTHER SURGICAL HISTORY      double hernia     OTHER SURGICAL HISTORY      hand (right)       Social History     Tobacco Use     Smoking status: Every Day     Packs/day: 1.00     Years: 52.00     Pack years: 52.00     Types: Cigarettes     Smokeless tobacco: Never     Tobacco comments:     .25 pack currently   Substance Use Topics     Alcohol use: No     Family History   Problem Relation Age of Onset     Diabetes Mother      Heart Disease Mother      Hypertension No family hx of      Coronary Artery Disease No family hx of      Cancer No family hx of          Problem list and past medical, surgical, social, and family histories reviewed & adjusted, as indicated.    Current Outpatient Medications   Medication Sig Dispense Refill     aspirin (ASA) 81 MG EC tablet Take 1 tablet (81 mg) by mouth daily 90 tablet 1     buPROPion (WELLBUTRIN SR) 150 MG 12 hr tablet Take 1 tablet (150 mg) by mouth 2 times daily 60 tablet 0     cyclobenzaprine (FLEXERIL) 5 MG tablet Take 1 tablet (5 mg) by mouth 3 times daily as needed for muscle spasms 30 tablet 1     ibuprofen (ADVIL/MOTRIN) 600 MG tablet Take 1 tablet (600 mg) by mouth every 6 hours as needed for moderate pain 28 tablet 0     naproxen (NAPROSYN) 500 MG tablet Take 1 tablet (500 mg) by mouth 2 times daily (with meals) 28 tablet 0     nicotine (NICODERM CQ) 7 MG/24HR 24 hr patch Place 1 patch onto the skin  "every 24 hours (Patient not taking: Reported on 5/19/2023) 14 patch 3     Nutritional Supplements (BOOST HIGH PROTEIN) LIQD Take 1 Can by mouth daily (Patient not taking: Reported on 3/17/2023) 7110 mL 3     Medication list reviewed and updated as indicated.    Allergies   Allergen Reactions     Latex Rash     Penicillins Rash     rash     Allergies reviewed and updated as indicated.  ----------------------------------------------------------------------------------------------------------------------  ROS:  Constitutional, HEENT, cardiovascular, pulmonary, GI, musculoskeletal, neuro, skin, and psych systems are negative, except as otherwise noted.    OBJECTIVE:     BP 91/67   Pulse 86   Resp 16   Ht 1.58 m (5' 2.21\")   Wt 55.6 kg (122 lb 8 oz)   SpO2 100%   BMI 22.26 kg/m    Body mass index is 22.26 kg/m .  Exam:  Constitutional: alert and no distress  Head: Normocephalic. Atraumatic  Neck: Neck supple. FROM  Cardiovascular: Acyanotic  Respiratory: Normal chest rise. Non-labored breathing.  Musculoskeletal: extremities normal- no gross deformities noted, gait normal and normal muscle tone  Skin: There are pink scaly patches and plaques on the extensor elbows.  Neurologic: Gait normal. CN II-XII grossly intact  Psychiatric: mentation appears normal and affect normal/bright      "

## 2023-07-10 DIAGNOSIS — Z72.0 SMOKING TRYING TO QUIT: ICD-10-CM

## 2023-07-10 RX ORDER — BUPROPION HYDROCHLORIDE 150 MG/1
150 TABLET, EXTENDED RELEASE ORAL 2 TIMES DAILY
Qty: 60 TABLET | Refills: 1 | Status: SHIPPED | OUTPATIENT
Start: 2023-07-10 | End: 2023-07-27

## 2023-07-10 NOTE — TELEPHONE ENCOUNTER
Message to physician:     Date of last visit: 6/21/2023    Date of next visit if scheduled:     Potassium   Date Value Ref Range Status   03/17/2023 5.2 3.4 - 5.3 mmol/L Final   02/17/2022 4.4 3.5 - 5.0 mmol/L Final   10/23/2020 4.5 3.4 - 5.3 mmol/L Final     Creatinine   Date Value Ref Range Status   03/17/2023 1.03 0.67 - 1.17 mg/dL Final   10/23/2020 0.8 0.8 - 1.5 mg/dL Final     GFR Estimate   Date Value Ref Range Status   03/17/2023 80 >60 mL/min/1.73m2 Final     Comment:     eGFR calculated using 2021 CKD-EPI equation.   06/25/2018 >60 >60 mL/min/1.73m2 Final       BP Readings from Last 3 Encounters:   06/21/23 91/67   05/22/23 104/68   05/21/23 110/71       Hemoglobin A1C   Date Value Ref Range Status   03/17/2023 5.6 0.0 - 5.6 % Final     Comment:     Normal <5.7%   Prediabetes 5.7-6.4%    Diabetes 6.5% or higher     Note: Adopted from ADA consensus guidelines.   05/20/2021 5.8 (H) 4.1 - 5.7 % Final       Please complete refill and CLOSE ENCOUNTER.  Closing the encounter signifies the refill is complete.

## 2023-07-13 ENCOUNTER — PATIENT OUTREACH (OUTPATIENT)
Dept: ONCOLOGY | Facility: CLINIC | Age: 68
End: 2023-07-13
Payer: COMMERCIAL

## 2023-07-13 NOTE — PROGRESS NOTES
[facility]: Cancer Care                                                                                          Attempted to call patient today to discuss the cancer treatment summary that was prepared and sent via [employer]. Left a generic voice message requesting a call back at my direct phone number: [phone].    Fidelina Hawk RN BSN  Cancer Survivorship Coordinator

## 2023-07-13 NOTE — PROGRESS NOTES
[facility]: Cancer Care                                                                                            Received a phone call back from Zafar's significant other, Myesha. She does not recall receiving the summary in the mail. Will place another copy in the mail per her request. She has my direct phone number and agrees to call if they have any questions once they receive the information.     Fidelina Hawk, RN, BSN, OCN  Cancer Survivorship Nurse Coordinator

## 2023-07-27 DIAGNOSIS — Z72.0 SMOKING TRYING TO QUIT: ICD-10-CM

## 2023-07-27 DIAGNOSIS — M54.50 LUMBAR BACK PAIN: ICD-10-CM

## 2023-07-27 RX ORDER — NAPROXEN 500 MG/1
500 TABLET ORAL 2 TIMES DAILY WITH MEALS
Qty: 60 TABLET | Refills: 1 | Status: SHIPPED | OUTPATIENT
Start: 2023-07-27 | End: 2023-10-02

## 2023-07-27 NOTE — CONFIDENTIAL NOTE
Requested prescription was sent to pharmacy on record.  Discussed with partner to take naproxen with food.

## 2023-08-04 DIAGNOSIS — Z72.0 SMOKING TRYING TO QUIT: Primary | ICD-10-CM

## 2023-08-04 RX ORDER — BUPROPION HYDROCHLORIDE 150 MG/1
150 TABLET, EXTENDED RELEASE ORAL 2 TIMES DAILY
COMMUNITY
End: 2023-08-04

## 2023-08-07 RX ORDER — BUPROPION HYDROCHLORIDE 150 MG/1
150 TABLET, EXTENDED RELEASE ORAL 2 TIMES DAILY
Qty: 180 TABLET | Refills: 1 | Status: SHIPPED | OUTPATIENT
Start: 2023-08-07 | End: 2024-01-18

## 2023-09-05 ENCOUNTER — HOSPITAL ENCOUNTER (OUTPATIENT)
Dept: CT IMAGING | Facility: HOSPITAL | Age: 68
Discharge: HOME | End: 2023-09-05
Attending: RADIOLOGY | Admitting: RADIOLOGY
Payer: COMMERCIAL

## 2023-09-05 DIAGNOSIS — C34.12 MALIGNANT NEOPLASM OF UPPER LOBE OF LEFT LUNG (H): ICD-10-CM

## 2023-09-05 PROCEDURE — 71250 CT THORAX DX C-: CPT

## 2023-09-06 ENCOUNTER — OFFICE VISIT (OUTPATIENT)
Dept: FAMILY MEDICINE | Facility: CLINIC | Age: 68
End: 2023-09-06
Payer: COMMERCIAL

## 2023-09-06 VITALS
RESPIRATION RATE: 20 BRPM | DIASTOLIC BLOOD PRESSURE: 78 MMHG | HEART RATE: 59 BPM | BODY MASS INDEX: 21.97 KG/M2 | OXYGEN SATURATION: 98 % | TEMPERATURE: 98 F | SYSTOLIC BLOOD PRESSURE: 122 MMHG | WEIGHT: 120.9 LBS

## 2023-09-06 DIAGNOSIS — M54.32 SCIATICA OF LEFT SIDE: Primary | ICD-10-CM

## 2023-09-06 PROCEDURE — 99213 OFFICE O/P EST LOW 20 MIN: CPT | Performed by: STUDENT IN AN ORGANIZED HEALTH CARE EDUCATION/TRAINING PROGRAM

## 2023-09-06 NOTE — PROGRESS NOTES
Assessment & Plan     Sciatica of left leg  History of left sided sciatica that is chronic. No acute worsening of symptoms or red flag symptoms. Patient attributes this pain to a lifting injury a few months ago. Pain persistent since then. Has not tried Naproxen BID, was only taking as needed. Recommended to take Naproxen BID and Flexeril as previously prescribed. Also will trial PT. No neuro deficits. Has a hx of lung cancer but has also had xrays of his back that didn't show any fracture, sublux or metastatic disease.  - Physical Therapy Referral; Future       Ofelia Licea MD  [facility] NANETTE Stephen is a 68 year old, presenting for the following health issues:  Musculoskeletal Problem (Lt leg constant pain x 2 months. Pain radiate to LLQ back per pt. Lifted laundry x 2 months per pt spouse. )       HPI     Left leg pain x 2 months  Starts in his left lower back  Has steady gnawing  Lifted laundry about 2 months ago per spouse - felt like he pulled a muscle that time ago  He's done work around the house that threw his back out which started it  Is able to walk daily  Still has a lata horse in the left   Has not taken anything at home for the pain       Review of Systems   Constitutional, HEENT, cardiovascular, pulmonary, gi and gu systems are negative, except as otherwise noted.      Objective    /78 (BP Location: Left arm, Patient Position: Sitting, Cuff Size: Adult Regular)   Pulse 59   Temp 98  F (36.7  C) (Oral)   Resp 20   Wt 54.8 kg (120 lb 14.4 oz)   SpO2 98%   BMI 21.97 kg/m    Body mass index is 21.97 kg/m .  Physical Exam   GENERAL: healthy, alert and no distress  RESP: lungs clear to auscultation - no rales, rhonchi or wheezes  CV: regular rate and rhythm, normal S1 S2, no S3 or S4, no murmur, click or rub, no peripheral edema and peripheral pulses strong  MS: no gross musculoskeletal defects noted, no edema  Comprehensive back pain exam:  No  tenderness, Range of motion not limited by pain, Lower extremity strength functional and equal on both sides, Lower extremity reflexes within normal limits bilaterally, Lower extremity sensation normal and equal on both sides, SLR neg b/l. Does have pain with palpation of posterior thigh.        EXAM: XR LUMBAR SPINE 2/3 VIEWS  LOCATION: [facility]  DATE/TIME: 5/19/2023 4:26 PM CDT     INDICATION: lifted heavy laundry basket yesterday morning. no fall, heard a pop then now having midline spinal pain in the lumbar coccyx area, shooting pain down right leg  COMPARISON: None.                                                                      IMPRESSION: 5 lumbar type vertebra. Minor thoracolumbar dextrocurvature and minor grade 1 anterolisthesis at L4-L5. Otherwise, no fracture or subluxation identified. Relatively preserved intervertebral disc heights. Minor lower lumbar facet arthropathy   from L3 through S1. Vascular calcifications.

## 2023-09-07 ENCOUNTER — OFFICE VISIT (OUTPATIENT)
Dept: RADIATION ONCOLOGY | Facility: HOSPITAL | Age: 68
End: 2023-09-07
Attending: RADIOLOGY
Payer: COMMERCIAL

## 2023-09-07 VITALS
BODY MASS INDEX: 21.99 KG/M2 | WEIGHT: 121 LBS | DIASTOLIC BLOOD PRESSURE: 86 MMHG | OXYGEN SATURATION: 99 % | HEART RATE: 61 BPM | SYSTOLIC BLOOD PRESSURE: 142 MMHG | RESPIRATION RATE: 16 BRPM

## 2023-09-07 DIAGNOSIS — C34.12 MALIGNANT NEOPLASM OF UPPER LOBE OF LEFT LUNG (H): Primary | ICD-10-CM

## 2023-09-07 PROCEDURE — G0463 HOSPITAL OUTPT CLINIC VISIT: HCPCS | Performed by: RADIOLOGY

## 2023-09-07 PROCEDURE — 99213 OFFICE O/P EST LOW 20 MIN: CPT | Performed by: RADIOLOGY

## 2023-09-07 ASSESSMENT — PAIN SCALES - GENERAL: PAINLEVEL: MILD PAIN (3)

## 2023-09-07 NOTE — PROGRESS NOTES
"Oncology Rooming Note    September 7, 2023 10:42 AM   Zafar Spencer is a 68 year old male who presents for:    Chief Complaint   Patient presents with    Oncology Clinic Visit     Rad Onc follow up for SBRT lung     Initial Vitals: BP (!) 142/86   Pulse 61   Resp 16   Wt 54.9 kg (121 lb)   SpO2 99%   BMI 21.99 kg/m   Estimated body mass index is 21.99 kg/m  as calculated from the following:    Height as of 6/21/23: 1.58 m (5' 2.21\").    Weight as of this encounter: 54.9 kg (121 lb). Body surface area is 1.55 meters squared.  Mild Pain (3) Comment: Data Unavailable   No LMP for male patient.  Allergies reviewed: Yes  Medications reviewed: Yes    Medications: Medication refills not needed today.  Pharmacy name entered into [employer]:    [id number] [place] [address] [facility] [address]    Clinical concerns: Dealing with some low back and leg pain, no new concerns regarding his lungs.   Dr. Yanes was notified.      Norma Cagle RN              "

## 2023-09-07 NOTE — LETTER
9/7/2023         RE: Zafar Spencer  [address]  [address] [place] [id number]        Dear Colleague,    Thank you for referring your patient, Zafar Spencer, to the [facility] RADIATION [facility]COL[facility] [place]. Please see a copy of my visit note below.    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  The patient is not a good candidate for surgery given his poor medical status.       TYPE OF RADIATION THERAPY ADMINISTERED:  4500 cGy in 5 treatments given from 6/27/2022 - 7/7/2022.     INTERVAL SINCE COMPLETION OF RADIATION THERAPY: 1 year and 2 months.      SUBJECTIVE:  Mr. Spencer is a 68 year old male who presents with an asymptomatic LEFT upper lobe nodule identified on lung cancer screening CT. patient had a few screening CT chest over the past few years.  A most recent CT chest on 2/19/2022 reviewed new irregular nodule within the left upper lobe measuring 12 x 10 x 5 mm and a second 16 x 7 x 3 mm abnormality in the right lower lobe.  This is worrisome for malignancy.  PET CT scan on 3/28/2022 showed FDG avid spiculated left upper lobe pleural-based mass suspicious for malignancy.  There is no FDG uptake in the right lower lobe lesion.  There is no radiographic evidence of lymph nodes and systemic metastasis.  Patient is not a good candidate for surgery given his current medical status. Patient received stereotactic radiosurgery for his lung cancer with a total dose of 4500 cGy in 5 treatments given from 6/27/2022 - 7/7/2022.  The patient tolerated ration therapy very well with minimal side effect.      The patient has been doing well since completion of the radiation therapy.  He denies any pain and discomfort at the time of evaluation.  He is here for routine post therapy office follow-up.     Medications were reviewed and are up to  date on EPIC.    The following portions of the patient's history were reviewed and updated as appropriate: allergies, current medications, past family history, past medical history, past social history, past surgical history and problem list.    Review of Systems:      General  Constitutional  Constitutional (WDL): Exceptions to WDL  Hot Flashes: Mild symptoms OR intervention not indicated (occasional night sweats)  EENT  Eye Disorders  Eye Disorder (WDL): Assessment not pertinent to visit  Ear Disorders  Ear Disorder (WDL): Assessment not pertinent to visit  Respiratory  Respiratory  Respiratory (WDL): All respiratory elements are within defined limits  Cardiovascular  Cardiovascular  Cardiovascular (WDL): All cardiovascular elements are within defined limits  Gastrointestinal  Gastrointestinal  Gastrointestinal (WDL): All gastrointestinal elements are within defined limits  Musculoskeletal  Musculoskeletal and Connective Tissue Disorders  Musculoskeletal & Connective (WDL): Exceptions to WDL  Arthralgia: Mild pain  Integumentary  Integumentary  Integumentary (WDL): All integumentary elements are within defined limits  Neurological  Neurosensory  Neurosensory (WDL): All neurosensory elements are within defined limits  Genitourinary/Reproductive  Genitourinary  Genitourinary (WDL): All genitourinary elements are within defined limits  Lymphatic  Lymph System Disorders  Lymph (WDL): All lymph elements are within defined limits  Pain  Pain Score: Mild Pain (3)  AUA Assessment                                                              Accompanied by  Accompanied By: significant other    Objective:      PHYSICAL EXAMINATION:    BP (!) 142/86   Pulse 61   Resp 16   Wt 54.9 kg (121 lb)   SpO2 99%   BMI 21.99 kg/m      Gen: Alert, in NAD  Eyes: PERRL, EOMI, sclera anicteric  Pulm: No wheezing, stridor or respiratory distress  CV: Well-perfused, no cyanosis, no pedal edema  Back: No step-offs or pain to palpation  along the thoracolumbar spine  Rectal: Deferred  : Deferred  Musculoskeletal: Normal muscle bulk and tone  Skin: Normal color and turgor  Neurologic: A/Ox3, CN II-XII intact, normal gait and station  Psychiatric: Appropriate mood and affect     Imaging: Imaging results 30 days: CT Chest w/o contrast    Result Date: 9/5/2023  EXAM: CT CHEST W/O CONTRAST LOCATION: [facility] DATE: 9/5/2023 INDICATION:  Malignant neoplasm of upper lobe of left lung (H). COMPARISON: 03/27/2023. TECHNIQUE: CT chest without IV contrast. Multiplanar reformats were obtained. Dose reduction techniques were used. CONTRAST: None. FINDINGS: LUNGS AND PLEURA: Stable fiducial marker and post treatment change in the anterior left upper lobe. Few other stable tiny pulmonary nodules. Stable emphysema and apical scarring. Retained airway secretions. No pleural effusion. MEDIASTINUM/AXILLAE: No adenopathy. CORONARY ARTERY CALCIFICATION: Mild. UPPER ABDOMEN: No significant finding. MUSCULOSKELETAL: No focal bone lesion.     IMPRESSION: 1.  Stable exam. 2.  No evidence of new or enlarging disease.       Impression     The patient is a 68-year-old gentleman with a diagnosis of early stage lung cancer status post SBRT completed 1 year and  2 months ago.  The patient has been doing well with no evidence of recurrence.     Assessment & Plan:     1.  I have reviewed his restaging CT scan and compared to the previous radiation therapy field and CT scan.  There is no evidence of cancer recurrence.  He is scheduled return to radiation oncology in 6 months for office follow-up and CT chest.     2.  Continue follow-up with Dr. Kirk Azevedo, PCP as planned     Face to face time 20 minutes with > 80% spent on consultation, education and coordination of care.      Janessa Yanes MD  Department of Radiation Oncology   [facility]  Tel: [phone]  Page: [phone]    [id number] [place] [address]  "    [facility]   [address] [place] [address]    CC:  Patient Care Team:  Zora Strong MD as PCP - General (Student in organized health care education/training program)  Poli Harrell MD as Assigned Heart and Vascular Provider  Janessa Yanes MD as MD (Radiation Oncology)  Janessa Yanes MD as Assigned Cancer Care Provider  Gabo Winn MD as Assigned Pulmonology Provider  Alex Roche MD as Assigned PCP      Oncology Rooming Note    September 7, 2023 10:42 AM   Zafar Spencer is a 68 year old male who presents for:    Chief Complaint   Patient presents with     Oncology Clinic Visit     Rad Onc follow up for SBRT lung     Initial Vitals: BP (!) 142/86   Pulse 61   Resp 16   Wt 54.9 kg (121 lb)   SpO2 99%   BMI 21.99 kg/m   Estimated body mass index is 21.99 kg/m  as calculated from the following:    Height as of 6/21/23: 1.58 m (5' 2.21\").    Weight as of this encounter: 54.9 kg (121 lb). Body surface area is 1.55 meters squared.  Mild Pain (3) Comment: Data Unavailable   No LMP for male patient.  Allergies reviewed: Yes  Medications reviewed: Yes    Medications: Medication refills not needed today.  Pharmacy name entered into [employer]:    [id number] [place] [address] [facility] [address]    Clinical concerns: Dealing with some low back and leg pain, no new concerns regarding his lungs.   Dr. Yanes was notified.      Norma Cagle RN                Again, thank you for allowing me to participate in the care of your patient.        Sincerely,        Janessa Yanes MD  "

## 2023-09-07 NOTE — PROGRESS NOTES
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  The patient is not a good candidate for surgery given his poor medical status.       TYPE OF RADIATION THERAPY ADMINISTERED:  4500 cGy in 5 treatments given from 6/27/2022 - 7/7/2022.     INTERVAL SINCE COMPLETION OF RADIATION THERAPY: 1 year and 2 months.      SUBJECTIVE:  Mr. Spencer is a 68 year old male who presents with an asymptomatic LEFT upper lobe nodule identified on lung cancer screening CT. patient had a few screening CT chest over the past few years.  A most recent CT chest on 2/19/2022 reviewed new irregular nodule within the left upper lobe measuring 12 x 10 x 5 mm and a second 16 x 7 x 3 mm abnormality in the right lower lobe.  This is worrisome for malignancy.  PET CT scan on 3/28/2022 showed FDG avid spiculated left upper lobe pleural-based mass suspicious for malignancy.  There is no FDG uptake in the right lower lobe lesion.  There is no radiographic evidence of lymph nodes and systemic metastasis.  Patient is not a good candidate for surgery given his current medical status. Patient received stereotactic radiosurgery for his lung cancer with a total dose of 4500 cGy in 5 treatments given from 6/27/2022 - 7/7/2022.  The patient tolerated ration therapy very well with minimal side effect.      The patient has been doing well since completion of the radiation therapy.  He denies any pain and discomfort at the time of evaluation.  He is here for routine post therapy office follow-up.     Medications were reviewed and are up to date on EPIC.    The following portions of the patient's history were reviewed and updated as appropriate: allergies, current medications, past family history, past medical history, past social history, past surgical history and problem list.    Review of Systems:       General  Constitutional  Constitutional (WDL): Exceptions to WDL  Hot Flashes: Mild symptoms OR intervention not indicated (occasional night sweats)  EENT  Eye Disorders  Eye Disorder (WDL): Assessment not pertinent to visit  Ear Disorders  Ear Disorder (WDL): Assessment not pertinent to visit  Respiratory  Respiratory  Respiratory (WDL): All respiratory elements are within defined limits  Cardiovascular  Cardiovascular  Cardiovascular (WDL): All cardiovascular elements are within defined limits  Gastrointestinal  Gastrointestinal  Gastrointestinal (WDL): All gastrointestinal elements are within defined limits  Musculoskeletal  Musculoskeletal and Connective Tissue Disorders  Musculoskeletal & Connective (WDL): Exceptions to WDL  Arthralgia: Mild pain  Integumentary  Integumentary  Integumentary (WDL): All integumentary elements are within defined limits  Neurological  Neurosensory  Neurosensory (WDL): All neurosensory elements are within defined limits  Genitourinary/Reproductive  Genitourinary  Genitourinary (WDL): All genitourinary elements are within defined limits  Lymphatic  Lymph System Disorders  Lymph (WDL): All lymph elements are within defined limits  Pain  Pain Score: Mild Pain (3)  AUA Assessment                                                              Accompanied by  Accompanied By: significant other    Objective:      PHYSICAL EXAMINATION:    BP (!) 142/86   Pulse 61   Resp 16   Wt 54.9 kg (121 lb)   SpO2 99%   BMI 21.99 kg/m      Gen: Alert, in NAD  Eyes: PERRL, EOMI, sclera anicteric  Pulm: No wheezing, stridor or respiratory distress  CV: Well-perfused, no cyanosis, no pedal edema  Back: No step-offs or pain to palpation along the thoracolumbar spine  Rectal: Deferred  : Deferred  Musculoskeletal: Normal muscle bulk and tone  Skin: Normal color and turgor  Neurologic: A/Ox3, CN II-XII intact, normal gait and station  Psychiatric: Appropriate mood and affect     Imaging: Imaging  results 30 days: CT Chest w/o contrast    Result Date: 9/5/2023  EXAM: CT CHEST W/O CONTRAST LOCATION: [facility] DATE: 9/5/2023 INDICATION:  Malignant neoplasm of upper lobe of left lung (H). COMPARISON: 03/27/2023. TECHNIQUE: CT chest without IV contrast. Multiplanar reformats were obtained. Dose reduction techniques were used. CONTRAST: None. FINDINGS: LUNGS AND PLEURA: Stable fiducial marker and post treatment change in the anterior left upper lobe. Few other stable tiny pulmonary nodules. Stable emphysema and apical scarring. Retained airway secretions. No pleural effusion. MEDIASTINUM/AXILLAE: No adenopathy. CORONARY ARTERY CALCIFICATION: Mild. UPPER ABDOMEN: No significant finding. MUSCULOSKELETAL: No focal bone lesion.     IMPRESSION: 1.  Stable exam. 2.  No evidence of new or enlarging disease.       Impression     The patient is a 68-year-old gentleman with a diagnosis of early stage lung cancer status post SBRT completed 1 year and  2 months ago.  The patient has been doing well with no evidence of recurrence.     Assessment & Plan:     1.  I have reviewed his restaging CT scan and compared to the previous radiation therapy field and CT scan.  There is no evidence of cancer recurrence.  He is scheduled return to radiation oncology in 6 months for office follow-up and CT chest.     2.  Continue follow-up with Dr. Kirk Azevedo, PCP as planned     Face to face time 20 minutes with > 80% spent on consultation, education and coordination of care.      Janessa Yanes MD  Department of Radiation Oncology   [facility]  Tel: [phone]  Page: [phone]    [facility]  [address] [place] [address]     [id number] [facility]   [place] [address]    CC:  Patient Care Team:  Zora Strong MD as PCP - General (Student in organized health care education/training program)  Poli Harrell MD as Assigned Heart and Vascular Provider  Joaquín  MD Janessa as MD (Radiation Oncology)  Janessa Yanes MD as Assigned Cancer Care Provider  Gabo Winn MD as Assigned Pulmonology Provider  Alex Roche MD as Assigned PCP

## 2023-09-07 NOTE — PRE-PROCEDURE
GENERAL PRE-PROCEDURE:   Procedure:  Computed tomography guided left lung biopsy, left fiducial placement with moderate sedation. Possible left chest tube placement  Date/Time:  5/3/2022 8:22 AM    Verbal consent obtained?: Yes    Written consent obtained?: Yes    Risks and benefits: Risks, benefits and alternatives were discussed    DC Plan: Appropriate discharge home plan in place for patients who are going home after procedure   Consent given by:  Patient  Patient states understanding of procedure being performed: Yes    Patient's understanding of procedure matches consent: Yes    Procedure consent matches procedure scheduled: Yes    Expected level of sedation:  Moderate  Appropriately NPO:  Yes  ASA Class:  2  Mallampati  :  Grade 2- soft palate, base of uvula, tonsillar pillars, and portion of posterior pharyngeal wall visible  Lungs:  Lungs clear with good breath sounds bilaterally  Heart:  Normal heart sounds and rate  History & Physical reviewed:  History and physical reviewed and no updates needed  Statement of review:  I have reviewed the lab findings, diagnostic data, medications, and the plan for sedation     [TextBox_44] : abnormal spirometry [Consultation] : a consultation

## 2023-09-12 ENCOUNTER — HOSPITAL ENCOUNTER (OUTPATIENT)
Dept: ULTRASOUND IMAGING | Facility: HOSPITAL | Age: 68
Discharge: HOME | End: 2023-09-12
Attending: FAMILY MEDICINE | Admitting: FAMILY MEDICINE
Payer: COMMERCIAL

## 2023-09-12 ENCOUNTER — OFFICE VISIT (OUTPATIENT)
Dept: FAMILY MEDICINE | Facility: CLINIC | Age: 68
End: 2023-09-12
Payer: COMMERCIAL

## 2023-09-12 VITALS
TEMPERATURE: 98.1 F | SYSTOLIC BLOOD PRESSURE: 104 MMHG | DIASTOLIC BLOOD PRESSURE: 65 MMHG | OXYGEN SATURATION: 98 % | WEIGHT: 121 LBS | HEART RATE: 72 BPM | BODY MASS INDEX: 21.44 KG/M2 | HEIGHT: 63 IN

## 2023-09-12 DIAGNOSIS — R60.0 LEG EDEMA, LEFT: ICD-10-CM

## 2023-09-12 DIAGNOSIS — M54.50 LUMBAR PAIN WITH RADIATION DOWN RIGHT LEG: ICD-10-CM

## 2023-09-12 DIAGNOSIS — K59.00 CONSTIPATION, UNSPECIFIED CONSTIPATION TYPE: ICD-10-CM

## 2023-09-12 DIAGNOSIS — R60.0 LEG EDEMA, LEFT: Primary | ICD-10-CM

## 2023-09-12 DIAGNOSIS — M79.604 LUMBAR PAIN WITH RADIATION DOWN RIGHT LEG: ICD-10-CM

## 2023-09-12 PROCEDURE — 93971 EXTREMITY STUDY: CPT | Mod: LT

## 2023-09-12 PROCEDURE — 99214 OFFICE O/P EST MOD 30 MIN: CPT | Mod: GC

## 2023-09-12 RX ORDER — CYCLOBENZAPRINE HCL 5 MG
5 TABLET ORAL 3 TIMES DAILY PRN
Qty: 30 TABLET | Refills: 0 | Status: SHIPPED | OUTPATIENT
Start: 2023-09-12 | End: 2023-10-13

## 2023-09-12 RX ORDER — SENNOSIDES 8.6 MG
1 TABLET ORAL DAILY
Qty: 30 TABLET | Refills: 0 | Status: SHIPPED | OUTPATIENT
Start: 2023-09-12 | End: 2023-12-28

## 2023-09-12 ASSESSMENT — PAIN SCALES - GENERAL: PAINLEVEL: EXTREME PAIN (8)

## 2023-09-12 NOTE — PROGRESS NOTES
Preceptor Attestation:  Patient's case reviewed and discussed with the resident, Clair Teresa MD, and I personally evaluated the patient. I agree with written assessment and plan of care.    Supervising Physician:  Lana Verdin MD   [facility]

## 2023-09-12 NOTE — PROGRESS NOTES
"  Assessment & Plan   (R60.0) Leg edema, left  (primary encounter diagnosis)  Comment: Likely musculoskeletal, though with minor edema some concern for DVT given history of cancer. Is able to walk, so reassuring. Will send for lower extremity Doppler. If negative, is likely a sequelae of lumbar radiculopathy known prior. Would continue 500mg naproxen BID (currently only taking 250mg) and taking Flexeril at night. Should continue with already prescribed PT, should call to make an appt. Recommend varying diet to increase fiber intake and fruits and vegetables as well. Refill for Flexeril sent. Would send compression stockings and PT at next visit, possible venous ablation in future.  Plan: US Lower Extremity Venous Duplex Left          (M54.50,  M79.604) Lumbar pain with radiation down right leg  Comment: Noted. Plan as above.  Plan: cyclobenzaprine (FLEXERIL) 5 MG tablet          (K59.00) Constipation, unspecified constipation type  Comment: Noted, though did two bowel movements over last 36 hours. Sent for sennosides at this time. Should come in for further visit if experiencing worsening symptoms.  Plan: sennosides (SENOKOT) 8.6 MG tablet         Return to clinic in 6 weeks if not improving      Clair Teresa MD  [facility]  Precepted patient with Dr. Lana Verdin.            González Stephen is a 68 year old, presenting for the following health issues:  Lft Leg (Feel like a Kevin horse, started about 2 weeks ago. Puff veins, no xay ray would like done to [facility] ) and Forms (Professional Refence form)      9/12/2023     2:01 PM   Additional Questions   Roomed by zac   Accompanied by wife       HPI     Here for left leg pain  Pain has been over the last 3 weeks  Has been stiff   Lying in bed makes the pain worse  Feels like his blood is backing up  Pain feels twice as bad as a \"kevin horse cramp\"  Was helping wife lift laundry 3 weeks ago and then he has felt a " "constant pain  Pain 7/10  Feels better after a hot back  Takes Naproxen 250mg per night but it doesn't help  Does have some radiating pain into the lower back  Has not been taking muscle relaxants for pain  Has poor diet, doesn't drink much water per day (1 gallon), eats lots of potatoes and meats but lots of sweets as well    Per Dr. Licea's note on 6 September 2023:  Sciatica of left leg  History of left sided sciatica that is chronic. No acute worsening of symptoms or red flag symptoms. Patient attributes this pain to a lifting injury a few months ago. Pain persistent since then. Has not tried Naproxen BID, was only taking as needed. Recommended to take Naproxen BID and Flexeril as previously prescribed. Also will trial PT. No neuro deficits. Has a hx of lung cancer but has also had xrays of his back that didn't show any fracture, sublux or metastatic disease.  - Physical Therapy Referral; Future      Review of Systems   Constitutional, HEENT, cardiovascular, pulmonary, gi and gu systems are negative, except as otherwise noted.      Objective    /65   Pulse 72   Temp 98.1  F (36.7  C) (Oral)   Ht 1.59 m (5' 2.6\")   Wt 54.9 kg (121 lb)   SpO2 98%   BMI 21.71 kg/m    Body mass index is 21.71 kg/m .    Physical Exam   General: Alert and oriented x 3, no acute distress  Skin: clean, dry, and intact  Resp: clear to ausculation bilaterally, no rales or wheezes  Cardio: RRR, S1 and S2 present  Abdomen: soft, nontender, nondistended, bowel sounds present x 4, no masses  Extremities:Varicose veins present bilaterally, L>R. Trace to 1+ edema of left leg up to calf. TTP left calf posterior to knee. TTP left medial quadriceps muscles. Strength 5/5 bilaterally in upper leg and lower leg extensors and flexors, peripheral pulses 1+ to 2+ bilaterally.   Psych: Slurred speech at baseline, normal mood          ----- Service Performed and Documented by Resident or Fellow ------              "

## 2023-09-15 ENCOUNTER — OFFICE VISIT (OUTPATIENT)
Dept: FAMILY MEDICINE | Facility: CLINIC | Age: 68
End: 2023-09-15
Payer: COMMERCIAL

## 2023-09-15 VITALS
WEIGHT: 120 LBS | DIASTOLIC BLOOD PRESSURE: 74 MMHG | SYSTOLIC BLOOD PRESSURE: 109 MMHG | HEART RATE: 82 BPM | TEMPERATURE: 98 F | RESPIRATION RATE: 20 BRPM | BODY MASS INDEX: 21.53 KG/M2 | OXYGEN SATURATION: 98 %

## 2023-09-15 DIAGNOSIS — M54.17 LUMBOSACRAL RADICULOPATHY: Primary | ICD-10-CM

## 2023-09-15 PROCEDURE — 99214 OFFICE O/P EST MOD 30 MIN: CPT | Mod: GC

## 2023-09-15 RX ORDER — PREDNISONE 20 MG/1
TABLET ORAL
Qty: 20 TABLET | Refills: 0 | Status: SHIPPED | OUTPATIENT
Start: 2023-09-15 | End: 2023-10-18

## 2023-09-15 NOTE — PATIENT INSTRUCTIONS
- Start prednisone taper   - Naproxen 500 mg BID   - Flexeril 5 mg at bedtime   - I prescribed a heating pad   - I referred you to physical therapy     - Follow-up in 2 weeks     - Could try drinking some tonic water at bedtime for leg cramps

## 2023-09-15 NOTE — PROGRESS NOTES
Assessment & Plan     Lumbosacral radiculopathy  Ongoing shooting pains down left leg, following S1 distribution. US negative for DVT on 9/12. Positive straight leg raise test on left. Likely lumbosacral radiculopathy. Given acute worsening of his radicular pain in the last few days, will start steroid taper.   - Prednisone taper   - Pain control: naproxen 500 mg BID, flexeril 5 mg at bedtime  - Miscellaneous DME Order - heating pad   - Physical Therapy Referral  - predniSONE (DELTASONE) 20 MG tablet  Dispense: 20 tablet; Refill: 0      Return in about 2 weeks (around 9/29/2023) for leg pain.    Zora Strong MD  [facility]    González Stephen is a 68 year old, presenting for the following health issues:  Pain (Left knee), Results, Deep Vein Thrombosis, Weight Loss, and Constipation        9/12/2023     2:01 PM   Additional Questions   Roomed by zac   Accompanied by wife       HPI     Left leg pain   - Continues to have significant pain shooting down his left leg   - Pain shoots down his thigh, down the posterior calf, and into his foot   - Non-positional pain, maybe a bit better when walking  - US negative for DVT on 9/12   - This has been ongoing for a few months   - Also gets lata horses in his left leg at night   - Taking naproxen BID and Flexeril at bedtime this week  - Pain seems to be getting worse instead of better    Review of Systems   Constitutional, HEENT, cardiovascular, pulmonary, gi and gu systems are negative, except as otherwise noted.      Objective    /74   Pulse 82   Temp 98  F (36.7  C) (Oral)   Resp 20   Wt 54.4 kg (120 lb)   SpO2 98%   BMI 21.53 kg/m    Body mass index is 21.53 kg/m .  Physical Exam   GENERAL: healthy, alert and no distress  NECK: no adenopathy, no asymmetry, masses, or scars and thyroid normal to palpation  RESP: lungs clear to auscultation - no rales, rhonchi or wheezes  CV: regular rate and rhythm, normal S1 S2, no  S3 or S4, no murmur, click or rub, no peripheral edema and peripheral pulses strong  ABDOMEN: soft, nontender, no hepatosplenomegaly, no masses and bowel sounds normal  MS: no gross musculoskeletal defects noted, no edema, straight leg raise on left elicited shooting pain down left leg to foot, right straight leg raise normal     EXAM: XR LUMBAR SPINE 2/3 VIEWS  LOCATION: [facility]  DATE/TIME: 5/21/2023 1:10 PM CDT     INDICATION: Fall, trauma.  COMPARISON: 05/19/2023 radiographs.                                                                   IMPRESSION: Five lumbar-type vertebral bodies. Similar mild thoracolumbar dextroscoliosis, apex at T12-L1. Similar 1-2 mm grade 1 anterolisthesis at L4-L5 with straightening of the normal lordosis. No interval spondylolisthesis. Maintained vertebral body   heights. No acute displaced fracture. Similar mild multilevel interbody degenerative change and facet arthrosis, most notably at L4-L5. Moderate atherosclerotic calcifications within the abdomen. Clear visualized lung bases. A surgical clip within the   left lower thoracic paraspinal region.    ----- Service Performed and Documented by Resident or Fellow ------            DME (Durable Medical Equipment) Orders and Documentation  Orders Placed This Encounter   Procedures    Miscellaneous DME Order        The patient was assessed and it was determined the patient is in need of the following listed DME Supplies/Equipment. Please complete supporting documentation below to demonstrate medical necessity.

## 2023-10-02 DIAGNOSIS — M54.50 LUMBAR BACK PAIN: ICD-10-CM

## 2023-10-03 RX ORDER — NAPROXEN 500 MG/1
500 TABLET ORAL 2 TIMES DAILY WITH MEALS
Qty: 60 TABLET | Refills: 1 | Status: SHIPPED | OUTPATIENT
Start: 2023-10-03 | End: 2023-10-31

## 2023-10-13 DIAGNOSIS — M54.50 LUMBAR PAIN WITH RADIATION DOWN RIGHT LEG: ICD-10-CM

## 2023-10-13 DIAGNOSIS — M79.604 LUMBAR PAIN WITH RADIATION DOWN RIGHT LEG: ICD-10-CM

## 2023-10-13 RX ORDER — CYCLOBENZAPRINE HCL 5 MG
5 TABLET ORAL 3 TIMES DAILY PRN
Qty: 30 TABLET | Refills: 0 | Status: SHIPPED | OUTPATIENT
Start: 2023-10-13 | End: 2023-10-18

## 2023-10-18 ENCOUNTER — OFFICE VISIT (OUTPATIENT)
Dept: FAMILY MEDICINE | Facility: CLINIC | Age: 68
End: 2023-10-18
Payer: COMMERCIAL

## 2023-10-18 ENCOUNTER — PATIENT OUTREACH (OUTPATIENT)
Dept: CARE COORDINATION | Facility: CLINIC | Age: 68
End: 2023-10-18

## 2023-10-18 VITALS
WEIGHT: 120 LBS | BODY MASS INDEX: 21.26 KG/M2 | DIASTOLIC BLOOD PRESSURE: 65 MMHG | OXYGEN SATURATION: 98 % | RESPIRATION RATE: 20 BRPM | TEMPERATURE: 98.4 F | HEART RATE: 71 BPM | HEIGHT: 63 IN | SYSTOLIC BLOOD PRESSURE: 112 MMHG

## 2023-10-18 DIAGNOSIS — Z00.00 WELLNESS EXAMINATION: Primary | ICD-10-CM

## 2023-10-18 DIAGNOSIS — E78.00 PURE HYPERCHOLESTEROLEMIA: ICD-10-CM

## 2023-10-18 DIAGNOSIS — Z23 NEEDS FLU SHOT: ICD-10-CM

## 2023-10-18 DIAGNOSIS — Z13.6 ENCOUNTER FOR ABDOMINAL AORTIC ANEURYSM (AAA) SCREENING: ICD-10-CM

## 2023-10-18 DIAGNOSIS — C34.12 MALIGNANT NEOPLASM OF UPPER LOBE OF LEFT LUNG (H): ICD-10-CM

## 2023-10-18 DIAGNOSIS — Z23 NEED FOR PNEUMOCOCCAL 20-VALENT CONJUGATE VACCINATION: ICD-10-CM

## 2023-10-18 DIAGNOSIS — Z59.86 FINANCIAL INSECURITY: ICD-10-CM

## 2023-10-18 DIAGNOSIS — Z00.00 ENCOUNTER FOR MEDICARE ANNUAL WELLNESS EXAM: Primary | ICD-10-CM

## 2023-10-18 DIAGNOSIS — Z13.220 SCREENING FOR HYPERLIPIDEMIA: ICD-10-CM

## 2023-10-18 DIAGNOSIS — Z72.0 TOBACCO USE: ICD-10-CM

## 2023-10-18 DIAGNOSIS — R01.1 SYSTOLIC MURMUR: ICD-10-CM

## 2023-10-18 PROBLEM — S82.831D CLOSED FRACTURE OF DISTAL END OF RIGHT FIBULA WITH ROUTINE HEALING, UNSPECIFIED FRACTURE MORPHOLOGY, SUBSEQUENT ENCOUNTER: Status: RESOLVED | Noted: 2021-04-21 | Resolved: 2023-10-18

## 2023-10-18 LAB
CHOLEST SERPL-MCNC: 263 MG/DL
HDLC SERPL-MCNC: 51 MG/DL
LDLC SERPL CALC-MCNC: 199 MG/DL
NONHDLC SERPL-MCNC: 212 MG/DL
TRIGL SERPL-MCNC: 67 MG/DL

## 2023-10-18 PROCEDURE — 90677 PCV20 VACCINE IM: CPT

## 2023-10-18 PROCEDURE — 36415 COLL VENOUS BLD VENIPUNCTURE: CPT

## 2023-10-18 PROCEDURE — G0439 PPPS, SUBSEQ VISIT: HCPCS | Mod: GC

## 2023-10-18 PROCEDURE — G0008 ADMIN INFLUENZA VIRUS VAC: HCPCS

## 2023-10-18 PROCEDURE — 90662 IIV NO PRSV INCREASED AG IM: CPT

## 2023-10-18 PROCEDURE — 99214 OFFICE O/P EST MOD 30 MIN: CPT | Mod: 25

## 2023-10-18 PROCEDURE — G0009 ADMIN PNEUMOCOCCAL VACCINE: HCPCS

## 2023-10-18 PROCEDURE — 80061 LIPID PANEL: CPT

## 2023-10-18 PROCEDURE — 99207 PR NO BILLABLE SERVICE THIS VISIT: CPT

## 2023-10-18 RX ORDER — ATORVASTATIN CALCIUM 20 MG/1
20 TABLET, FILM COATED ORAL DAILY
Qty: 90 TABLET | Refills: 1 | Status: SHIPPED | OUTPATIENT
Start: 2023-10-18 | End: 2023-12-28 | Stop reason: ALTCHOICE

## 2023-10-18 SDOH — ECONOMIC STABILITY - INCOME SECURITY: FINANCIAL INSECURITY: Z59.86

## 2023-10-18 NOTE — PATIENT INSTRUCTIONS
Vaccinations to get at pharmacy   - TDAP (tetanus)   - COVID-19 booster     Schedule   - Echocardiogram (heart ultrasound)  - Aorta ultrasound      EXAM: US LOWER EXTREMITY VENOUS DUPLEX LEFT  LOCATION: [facility]  DATE: 9/12/2023     INDICATION: Lower leg pain, swollen extremity, history of varicose veins.  COMPARISON: None.  TECHNIQUE: Venous Duplex ultrasound of the left lower extremity with and without compression, augmentation and duplex. Color flow and spectral Doppler with waveform analysis performed.     FINDINGS: Exam includes the common femoral, femoral, popliteal, and contralateral common femoral veins as well as segmentally visualized deep calf veins and greater saphenous vein.      LEFT: No deep vein thrombosis. No superficial thrombophlebitis. No popliteal cyst. The proximal left great saphenous vein incidentally noted to be patent, though also insufficient. Varicose veins noted at the mid thigh level, all of which appear patent.                                                                      IMPRESSION:  1.  No deep venous thrombosis in the left lower extremity.      PERSONAL PREVENTIVE SERVICES PLAN - SERVICES     Review these tests with your medical staff then decide which ones you want and take this page home for your reference      SCREENING TESTS     Description   Year of Last Screening   Recommended Today?   Heart disease screening blood tests  Cholesterol level Reducing cholesterol can reduce your risk of heart attacks by 25%.  Screening is recommended yearly if you are at risk of heart disease otherwise every 4-5 years 2018 Yes; Recommended and ordered.   Diabetes screening tests  Hemoglobin A1c blood test   Finding and treating diabetes early can reduce complications.  Screening recommended/covered yearly if you have high blood pressure, high cholesterol, obesity (BMI >30), or a history of high blood glucose tests; or 2 of the following: family history of  diabetes, overweight (BMI >25 but <30), age 65 years or older, and a history of diabetes of pregnancy or gave birth to baby weighing more than 9 lbs. 03/2023 No; is up to date.   Hepatitis B screening Finding hepatitis B early can reduce complications.  Screening is recommended for persons with selected risk factors. - No: is not indicated today.   Hepatitis C screening Finding hepatitis C early can reduce complications.  Screening is recommended for all persons born from 1945 through 1965 and for those with selected other risk factors.  07/18 No; is up to date.   HIV screening Finding HIV early can reduce complications.  Screening is recommended for persons with risk factors for HIV infection. 07/18 No; is up to date.   Glaucoma screening Early detection of glaucoma can prevent blindness.   Please talk to your eye doctor about this.       SCREENING TESTS     Description   Year of Last Screening   Recommended Today?   Colorectal cancer screening  Fecal occult blood test   Screening colonoscopy Screening for colon cancer has been shown to reduce death from colon cancer by 25-30%. Screening recommended to start at 50 years and continuing until age 75 years.   2019 No; is up to date.   Breast Cancer Screening (women)  Mammogram Mammogram screening for breast cancer has been shown to reduce the risk of dying from breast cancer and prolong life. Screening is recommended every 1-2 years for women aged 50 to 74 years.  - No: is not indicated today.   Cervical Cancer screening (women)  Pap Cervical pap smears can reduce cervical cancer. Screening is recommended annually if high risk (history of abnormal pap smears) otherwise every 2-3 years, stop screening at 65 years of age if history of normal paps. - No: is not indicated today.   Screening for Osteoporosis:  Bone mass measurements (women)  Dexa Scan Screening and treating Osteoporosis can reduce the risk of hip and spine fractures. Screening is recommended in women 65  years or older and in women and men at risk of osteoporosis. - No: is not indicated today.   Screening for Lung Cancer   Low-dose CT scanning Screening can reduce mortality in persons aged 55-80 who have smoked at least 30 pack-years and who are either still smoking or have quit in the past 15 years. - Already receives routine lung CT   Abdominal Aortic Aneurysm (AAA) screening  Ultrasound (US)   An aneurysm treated before rupture is very safe -a ruptured aneurysm can be fatal.  Screening  by US for AAA is limited to patients who meet one of the following criteria:  Men who are 65-75 years old and have smoked more than 100 cigarettes in their lifetime  Anyone with a family history of abdominal aortic aneurysm - Yes; Recommended due to tobacco use history     Here are your recommended immunizations.  Take this home for your reference.                                                    IMMUNIZATIONS Description Recommend today?     Influenza (Flu shot) Prevents flu; should get every year Yes; Recommended and ordered.   PCV 13 Pneumonia vaccination; you get it once No; is up to date.   PPSV 23 Second pneumonia vaccination; usually get it 1 year after PCV 13 No; is up to date.   Zoster (Shingles) Prevents shingles; you get it once  (Check with Part D insurance for coverage, must receive at a pharmacy, not clinic) Yes; Recommended and ordered.   Tetanus Prevents tetanus; once every 10 years No; is up to date.     Hepatitis B  If you have any of the following risk factors you should be immunized for hepatitis B: severe kidney disease, people who live in the same house as a carrier of Hepatitis B virus, people who live in  institutions (e.g. nursing homes or group homes), homosexual men, patients with hemophilia who received Factor VIII or IX concentrates, abusers of illicit injectable drugs No: is not indicated today.      PATIENT INSTRUCTIONS    Yearly exam:   See your health care provider every year in order to review  changes in your health, review medicines that you take, and discuss preventive care needs such as immunizations and cancer screening.  Get a flu shot each year.     Advance Directives:  If you have not done so, you are encouraged to complete advance directives and/or a living will.   More information about advance directives can be found at: http://www.[place]med.org/advocacy/Key-Issues/Advance-Directives    Nutrition:   Eat at least 5 servings of fruits and vegetables each day.   Eat whole-grain bread, whole-wheat pasta and brown rice instead of white grains and rice.   Talk to your doctor about Calcium and Vitamin D.     Lifestyle:  Exercise for at least 150 minutes a week (30 minutes a day, 5 days a week). This will help you control your weight and prevent disease.   Limit alcohol to one drink per day.   If you smoke, try to quit - your doctor will be happy to help.   Wear sunscreen to prevent skin cancer.   See your dentist every six months for an exam and cleaning.   See your eye doctor every 1 to 2 years to screen for conditions such as glaucoma, macular degeneration and cataracts.                            Patient Education   Personalized Prevention Plan  You are due for the preventive services outlined below.  Your care team is available to assist you in scheduling these services.  If you have already completed any of these items, please share that information with your care team to update in your medical record.  Health Maintenance Due   Topic Date Due    Discuss Advance Care Planning  Never done    COPD Action Plan  Never done    Zoster (Shingles) Vaccine (1 of 2) Never done    RSV VACCINE 60+ (1 - 1-dose 60+ series) Never done    Pneumococcal Vaccine (3 - PPSV23 or PCV20) 08/27/2019    COVID-19 Vaccine (3 - Pfizer risk series) 07/03/2021    Diptheria Tetanus Pertussis (DTAP/TDAP/TD) Vaccine (3 - Td or Tdap) 10/19/2022    Cholesterol Lab  07/13/2023    Flu Vaccine (1) 09/01/2023

## 2023-10-18 NOTE — PROGRESS NOTES
SUBJECTIVE:   Zafar is a 68 year old who presents for Preventive Visit.      9/12/2023     2:01 PM   Additional Questions   Roomed by zac   Accompanied by wife       Are you in the first 12 months of your Medicare coverage?  No    HPI    Smoking 3-4 cigarettes daily (much less than prior, used to smoke 1-2 packs daily).     Back pain resolved almost completely after prednisone burst.     History of lung cancer. Feels breathing is normal.     Patient and partner Myesha express a lot of financial concerns and would appreciate care coordinator referral.     Varicose veins in lower extremities  Not painful but very pronounced  Has not used compression stockings   His neighbor thinks that Zafar is going to die due to the severity of his varicose veins.     Have you ever done Advance Care Planning? (For example, a Health Directive, POLST, or a discussion with a medical provider or your loved ones about your wishes): No, advance care planning information given to patient to review.  Patient plans to discuss their wishes with loved ones or provider.        Fall risk  Fallen 2 or more times in the past year?: No  Any fall with injury in the past year?: No  Timed Up and Go Test (>13.5 is fall risk; contact physician) : 9 (Even and steady gait)    Cognitive Screening Deferred due to multiple other issues addressed/time constraints.     Do you have sleep apnea, excessive snoring or daytime drowsiness? : no    Reviewed and updated as needed this visit by clinical staff   Tobacco  Allergies  Meds  Problems  Med Hx  Surg Hx  Fam Hx          Reviewed and updated as needed this visit by Provider   Tobacco  Allergies  Meds  Problems  Med Hx  Surg Hx  Fam Hx         Social History     Tobacco Use    Smoking status: Every Day     Packs/day: 1.00     Years: 52.00     Additional pack years: 0.00     Total pack years: 52.00     Types: Cigarettes    Smokeless tobacco: Never    Tobacco comments:     .25 pack currently  "  Substance Use Topics    Alcohol use: No              No data to display              Do you have a current opioid prescription? No  Do you use any other controlled substances or medications that are not prescribed by a provider? Cannabis    Current providers sharing in care for this patient include:   Patient Care Team:  Zora Strong MD as PCP - General (Student in organized health care education/training program)  Poli Harrell MD as Assigned Heart and Vascular Provider  Janessa Yanes MD as MD (Radiation Oncology)  Janessa Yanes MD as Assigned Cancer Care Provider  Gabo Winn MD as Assigned Pulmonology Provider  Clair Teresa MD as Assigned PCP    The following health maintenance items are reviewed in Epic and correct as of today:  Health Maintenance   Topic Date Due    ADVANCE CARE PLANNING  Never done    COPD ACTION PLAN  Never done    ZOSTER IMMUNIZATION (1 of 2) Never done    RSV VACCINE 60+ (1 - 1-dose 60+ series) Never done    Pneumococcal Vaccine: 65+ Years (3 - PPSV23 or PCV20) 08/27/2019    COVID-19 Vaccine (3 - Pfizer risk series) 07/03/2021    DTAP/TDAP/TD IMMUNIZATION (3 - Td or Tdap) 10/19/2022    LIPID  07/13/2023    INFLUENZA VACCINE (1) 09/01/2023    NICOTINE/TOBACCO CESSATION COUNSELING Q 1 YR  10/18/2024    MEDICARE ANNUAL WELLNESS VISIT  10/18/2024    FALL RISK ASSESSMENT  10/18/2024    COLORECTAL CANCER SCREENING  08/16/2028    SPIROMETRY  Completed    HEPATITIS C SCREENING  Completed    PHQ-2 (once per calendar year)  Completed    AORTIC ANEURYSM SCREENING (SYSTEM ASSIGNED)  Completed    IPV IMMUNIZATION  Aged Out    HPV IMMUNIZATION  Aged Out    MENINGITIS IMMUNIZATION  Aged Out    LUNG CANCER SCREENING  Discontinued         Review of Systems  Constitutional, HEENT, cardiovascular, pulmonary, gi and gu systems are negative, except as otherwise noted.    OBJECTIVE:   /65   Pulse 71   Temp 98.4  F (36.9  C)   Resp 20   Ht 1.6 m (5' 3\")   Wt 54.4 kg (120 lb)   SpO2 " "98%   BMI 21.26 kg/m   Estimated body mass index is 21.26 kg/m  as calculated from the following:    Height as of this encounter: 1.6 m (5' 3\").    Weight as of this encounter: 54.4 kg (120 lb).  Physical Exam  GENERAL: healthy, alert and no distress  EYES: Eyes grossly normal to inspection, PERRL and conjunctivae and sclerae normal  HENT: ear canals and TM's normal, nose and mouth without ulcers or lesions  NECK: no adenopathy, no asymmetry, masses, or scars and thyroid normal to palpation  RESP: lungs clear to auscultation - no rales, rhonchi or wheezes  CV: regular rate and rhythm, normal S1 S2, no S3 or S4, 2/6 systolic murmur loudest at LUSB but also present at RUSB and LLSB, no click or rub, no peripheral edema and peripheral pulses strong  ABDOMEN: soft, nontender, no hepatosplenomegaly, no masses and bowel sounds normal  MS: no gross musculoskeletal defects noted, no edema, pronounced varicose veins on bilateral lower extremities   SKIN: no suspicious lesions or rashes  NEURO: Normal strength and tone, mentation intact and speech normal  PSYCH: mentation appears normal, affect somewhat flat     Diagnostic Test Results:  Labs reviewed in Epic    ASSESSMENT / PLAN:   Zafar was seen today for wellness visit.    Diagnoses and all orders for this visit:    Encounter for Medicare annual wellness exam  Patient reports he is doing well today. Expresses concern regarding varicose veins, see details below. Reviewed and updated problem list, medication list. VSS. Exam with new systolic murmur, see details below. Preventative care reviewed.   - PCV20 and influenza vaccines given today in clinic   - Due to Medicare coverage issues, patient will need to get the following vaccines at his pharmacy: TDAP, COVID-19, RSV, Zoster.   - Aortic US for AAA screening ordered   - Lipid profile for hyperlipidemia screening   - Return to clinic in 1 year for annual wellness visit     Systolic murmur  New systolic murmur noted on exam " today - 2/6 intensity at RUSB, LUSB, and LLSB. No concerning symptoms. Will order echocardiogram.   -     Echocardiogram Complete; Future    Hyperlipidemia, unspecified hyperlipidemia type  Screening for hyperlipidemia  Screening lipid profile today with , , HDL 51. ASCVD risk score 18.7% - recommendation for moderate to high intensity statin therapy. Called patient to recommend starting atorvastatin 20 mg daily.   - Start atorvastatin 20 mg daily (moderate intensity statin)   - Recheck lipid profile in 6 weeks (approx 11/29/23)   -     atorvastatin (LIPITOR) 20 MG tablet; Take 1 tablet (20 mg) by mouth daily  -     Lipid panel reflex to direct LDL Non-fasting; Future    Encounter for abdominal aortic aneurysm (AAA) screening  Current smoker. No history of AAA screening.   -     [facility] Medicare AAA Screening    Financial insecurity  Patient and partner Myesha with numerous financial concerns today. They would appreciate a care coordinator referral.   -     Primary Care - Care Coordination Referral; Future    Needs flu shot  -     INFLUENZA VACCINE 65+ (FLUZONE HD)    Need for pneumococcal 20-valent conjugate vaccination  -     Pneumococcal 20 Valent Conjugate (PCV20)        Patient has been advised of split billing requirements and indicates understanding: Yes      COUNSELING:  Reviewed preventive health counseling, as reflected in patient instructions  Special attention given to:       Consider AAA screening for ages 65-75 and smoking history       Regular exercise       Healthy diet/nutrition    He reports that he has been smoking cigarettes. He has a 52.00 pack-year smoking history. He has never used smokeless tobacco.  Nicotine/Tobacco Cessation Plan:   Patient already on wellbutrin and nicotine patch. Has done a lot of work to cut back - now smoking 3-4 cigarettes daily instead of 1-2 packs. Will continue to discuss at future visits.     Appropriate preventive services were discussed with this  patient, including applicable screening as appropriate for fall prevention, nutrition, physical activity, Tobacco-use cessation, weight loss and cognition.  Checklist reviewing preventive services available has been given to the patient.    Reviewed patients plan of care and provided an AVS. The Basic Care Plan (routine screening as documented in Health Maintenance) for Zafar meets the Care Plan requirement. This Care Plan has been established and reviewed with the Patient and partner Myesha .      Zora Strong MD  [facility]    Identified Health Risks:  I have reviewed Opioid Use Disorder and Substance Use Disorder risk factors and made any needed referrals.

## 2023-10-18 NOTE — NURSING NOTE
Medicare Wellness Visit  Health Risk Assessment           Health Risk Assessment / Review of Systems     Constitutional: Any fevers or night sweats? No     Eyes:  Vision problems   No     Hearing Do you feel you have hearing loss?   YES feels right ear is worsening    Cardiovascular: Any chest pain, fast or irregular heart beat, calf pain with walking?     No           Respiratory:   Any breathing problems or cough?    YES Emphysema chronic. In remission for lung cancer, gets routine screenings    Gastrointestinal: Any stomach or stool problems?   No      Genitourinary: Do you have difficulty controlling urination?   No     Muscles and Joints: Any joint stiffness or soreness?    YES stiffness in AM, improves with movement    Skin: Any concerning lesions or moles?    YES Large varicose veins on left  leg. Painful when sitting on them and got worse with radiation. Concerned may need surgery, had concern about losing his leg due to the varicose veins. Reassurance provided, interested to see if eligible for sclerotherapy instead of surgery    Nervous System: Any loss of strength or feeling, numbness or tingling, shaking, dizziness, or headache?   YES bilateral loss of strength In hands but still able to hold items up to 30 lbs    Mental Health: Any depression, anxiety or problems sleeping?    No , average 7-8h/night of sleep    Cognition: Do you have any problems with your memory?   YES unable to remember childhood           Medical Care     What other specialists or organizations are involved in your medical care?    Patient Care Team         Relationship Specialty Notifications Start End    Zora Strong MD PCP - General Student in organized health care education/training program  7/1/23     Phone: [phone] Fax: [phone]         [facility] [place] [address]    Poli Harrell MD Assigned Heart and Vascular Provider   4/17/22     Phone: [phone] Fax: [phone]  [facility] [address] [facility] [address]    Janessa Yanes MD MD Radiation Oncology  4/18/22     Phone: [phone] Fax: [phone]         [address] [facility] [address]    Janessa Yanes MD Assigned Cancer Care Provider   4/24/22     Phone: [phone] Fax: [phone] [address] [facility] [address]    Gabo Winn MD Assigned Pulmonology Provider   8/27/22     Phone: [phone] Fax: [phone] [address] [facility] [address] [facility] [address]    Clair Teresa MD Assigned PCP   9/23/23     Phone: [phone] Fax: [phone]         [facility] [address]            Have you been to the ER or overnight in the hospital in the last year?  No          Social History / Home Safety       Marital Status:Partnered  Who lives in your household? Self and partner    Do you feel threatened or controlled by a partner, ex-partner or anyone in your life? No     Has anyone hurt you physically, for example by pushing, hitting, slapping or kicking you   or forcing you to have sex? No          Does your home have any of the following safety concerns; loose rugs in the hallway,  bathrooms with no grab bars by the tub or toilet, stairs with no handrails or poorly lit areas?   YES lack of grab bars in bathroom    Do you need help with dressing yourself, bathing, eating or getting around your home?  No     Do you need help with the phone, transportation, shopping, preparing meals, housework, laundry, medications or managing money?No       Risk Behaviors and Healthy Habits     History   Smoking Status    Every Day    Packs/day: 1.00    Years: 52.00    Types: Cigarettes   Smokeless Tobacco    Never     How many servings of fruits and vegetables do you eat a day? 1-2/day    Exercise: 6-7 days/week for an average of 15-30 minutes walking , strength training, and yoga/stretching     Do you frequently drive without a seatbelt? No     Do you use tobacco?   YES 3-4 cigarettes/day    Do you use any other  drugs?  YES current THC use for pain and to increase appetite. Interested in medicinal THC at this time        Do you use alcohol?No      Frailty Assessment            Have you lost 10 or more pounds unintentionally in the previous year?  YES yes per partner, patient unsure but thinks maybe has lost 10 or more lbs    How difficult is walking from one room to the other on the same level?not   No     Is it difficult to lift or carry something as heavy as 10 pounds?not   No    Compared with most (men/women) your age, would you say  that you are more active, less active, or about the same? more   No          10/18/2023    10:28 AM 10/18/2023    10:00 AM 9/7/2023    10:41 AM   FALL RISK ASSESSMENT   Fallen 2 or more times in the past year? No  No   Any fall with injury in the past year? No  No   Timed Up and Go Test/Seconds (13.5 is a fall risk; contact physician)  9          Advance Directives:   Discussed with patient and family as appropriate. Has patient  completed advance directives and/or a living will? yes  Stated they have a completed directive but their niece has it currently. Will ask for  copy and bring to clinic     Anamika Bravo RN

## 2023-10-18 NOTE — PROGRESS NOTES
Clinic Care Coordination Contact  Follow Up Progress Note      Assessment: There was a care coordination referral put in for the pt for housing, and food. I called and talked to the pt wife. I asked her about their housing, she stated that they are still looking for a affordable place to live. As for the food, they are ok, they are not worried about it. She stated that they are looking into a couple of places and waiting to see. She stated that she will call me, if she needs any further help.     Care Gaps:    Health Maintenance Due   Topic Date Due    ADVANCE CARE PLANNING  Never done    COPD ACTION PLAN  Never done    ZOSTER IMMUNIZATION (1 of 2) Never done    RSV VACCINE 60+ (1 - 1-dose 60+ series) Never done    COVID-19 Vaccine (3 - Pfizer risk series) 07/03/2021    DTAP/TDAP/TD IMMUNIZATION (3 - Td or Tdap) 10/19/2022    LIPID  07/13/2023           Care Plans      Intervention/Education provided during outreach:               Plan:     Care Coordinator will follow up in

## 2023-10-18 NOTE — LETTER
October 20, 2023      Zafar Spencer  [address]  [address]  [facility] [address]        Dear ,    We are writing to inform you of your test results.    Your cholesterol was quite elevated. I recommend starting a new medication - atorvastatin 20 mg daily. I sent it to your pharmacy. This medication helps to lower your cholesterol, which decreases your chance of having a stroke or heart attack in the future. We can continue to discuss this at your next appointment.     Resulted Orders   Lipid panel reflex to direct LDL Non-fasting   Result Value Ref Range    Cholesterol 263 (H) <200 mg/dL    Triglycerides 67 <150 mg/dL    Direct Measure HDL 51 >=40 mg/dL    LDL Cholesterol Calculated 199 (H) <=100 mg/dL    Non HDL Cholesterol 212 (H) <130 mg/dL    Narrative    Cholesterol  Desirable:  <200 mg/dL    Triglycerides  Normal:  Less than 150 mg/dL  Borderline High:  150-199 mg/dL  High:  200-499 mg/dL  Very High:  Greater than or equal to 500 mg/dL    Direct Measure HDL  Female:  Greater than or equal to 50 mg/dL   Male:  Greater than or equal to 40 mg/dL    LDL Cholesterol  Desirable:  <100mg/dL  Above Desirable:  100-129 mg/dL   Borderline High:  130-159 mg/dL   High:  160-189 mg/dL   Very High:  >= 190 mg/dL    Non HDL Cholesterol  Desirable:  130 mg/dL  Above Desirable:  130-159 mg/dL  Borderline High:  160-189 mg/dL  High:  190-219 mg/dL  Very High:  Greater than or equal to 220 mg/dL       If you have any questions or concerns, please call the clinic at the number listed above.       Sincerely,      Rita Kunz MD

## 2023-10-20 ENCOUNTER — HOSPITAL ENCOUNTER (OUTPATIENT)
Dept: ULTRASOUND IMAGING | Facility: HOSPITAL | Age: 68
Discharge: HOME | End: 2023-10-20
Attending: FAMILY MEDICINE | Admitting: FAMILY MEDICINE
Payer: COMMERCIAL

## 2023-10-20 DIAGNOSIS — Z13.6 ENCOUNTER FOR ABDOMINAL AORTIC ANEURYSM (AAA) SCREENING: ICD-10-CM

## 2023-10-20 DIAGNOSIS — Z00.00 ENCOUNTER FOR MEDICARE ANNUAL WELLNESS EXAM: ICD-10-CM

## 2023-10-20 PROCEDURE — 76706 US ABDL AORTA SCREEN AAA: CPT

## 2023-10-25 ENCOUNTER — TELEPHONE (OUTPATIENT)
Dept: FAMILY MEDICINE | Facility: CLINIC | Age: 68
End: 2023-10-25
Payer: COMMERCIAL

## 2023-10-30 ENCOUNTER — HOSPITAL ENCOUNTER (OUTPATIENT)
Dept: CARDIOLOGY | Facility: HOSPITAL | Age: 68
Discharge: HOME | End: 2023-10-30
Payer: COMMERCIAL

## 2023-10-30 DIAGNOSIS — R01.1 SYSTOLIC MURMUR: ICD-10-CM

## 2023-10-30 PROCEDURE — 93306 TTE W/DOPPLER COMPLETE: CPT

## 2023-10-31 ENCOUNTER — OFFICE VISIT (OUTPATIENT)
Dept: FAMILY MEDICINE | Facility: CLINIC | Age: 68
End: 2023-10-31
Payer: COMMERCIAL

## 2023-10-31 VITALS
SYSTOLIC BLOOD PRESSURE: 132 MMHG | OXYGEN SATURATION: 98 % | RESPIRATION RATE: 20 BRPM | BODY MASS INDEX: 20.9 KG/M2 | DIASTOLIC BLOOD PRESSURE: 83 MMHG | WEIGHT: 118 LBS | HEART RATE: 62 BPM | TEMPERATURE: 98 F

## 2023-10-31 DIAGNOSIS — M54.32 SCIATICA OF LEFT SIDE: ICD-10-CM

## 2023-10-31 DIAGNOSIS — Z23 ENCOUNTER FOR ADMINISTRATION OF VACCINE: ICD-10-CM

## 2023-10-31 DIAGNOSIS — F17.200 TOBACCO DEPENDENCE: ICD-10-CM

## 2023-10-31 DIAGNOSIS — I72.3 COMMON ILIAC ANEURYSM (H): Primary | ICD-10-CM

## 2023-10-31 DIAGNOSIS — Z23 ENCOUNTER FOR ADMINISTRATION OF COVID-19 VACCINE: ICD-10-CM

## 2023-10-31 PROCEDURE — 99214 OFFICE O/P EST MOD 30 MIN: CPT | Mod: GC

## 2023-10-31 RX ORDER — NAPROXEN 500 MG/1
500 TABLET ORAL 2 TIMES DAILY WITH MEALS
Qty: 60 TABLET | Refills: 1 | Status: SHIPPED | OUTPATIENT
Start: 2023-10-31 | End: 2024-01-25

## 2023-10-31 RX ORDER — RESPIRATORY SYNCYTIAL VIRUS VACCINE 120MCG/0.5
0.5 KIT INTRAMUSCULAR ONCE
Qty: 1 EACH | Refills: 0 | Status: CANCELLED | OUTPATIENT
Start: 2023-10-31 | End: 2023-10-31

## 2023-10-31 RX ORDER — NICOTINE 21 MG/24HR
1 PATCH, TRANSDERMAL 24 HOURS TRANSDERMAL EVERY 24 HOURS
Qty: 28 PATCH | Refills: 0 | Status: SHIPPED | OUTPATIENT
Start: 2023-10-31 | End: 2024-03-22

## 2023-10-31 RX ORDER — POLYETHYLENE GLYCOL 3350 17 G
2 POWDER IN PACKET (EA) ORAL
Qty: 90 LOZENGE | Refills: 0 | Status: SHIPPED | OUTPATIENT
Start: 2023-10-31 | End: 2024-03-22

## 2023-10-31 NOTE — PROGRESS NOTES
Assessment & Plan   (I72.3) Common iliac aneurysm (H24)  (primary encounter diagnosis)  Comment: Aneurysm is less than the 3cm required for intervention. However, with continued leg pain and diminished pulses, would benefit from seeing vascular medicine for recommendations on intervention vs when to follow up on results. Referral made. Encouraged patient to stop smoking, which he will work on and plan listed as below.  Plan: Vascular Medicine Referral          (M54.32) Sciatica of left side  Comment: As above. Refilled naproxen to help manage pain.  Plan: naproxen (NAPROSYN) 500 MG tablet          (Z23) Encounter for administration of COVID-19 vaccine  Comment: Due. Given  Plan: COVID-19 12+ (2023-24) (PFIZER)          (Z23) Encounter for administration of vaccine  Comment: Due. Given.  Plan: Tdap, tetanus-diptheria-acell pertussis,         (BOOSTRIX) 5-2.5-18.5 LF-MCG/0.5 BOYD injection          (F17.200) Tobacco dependence  Comment: Working on cutting down at this time. Noted that likely his longstanding history of smoking is now affecting his cardiac and vascular function. Will add on lozenges and patches to regimen today, recommending stopping smoking altogether given that he smokes cigarettes he finds on the ground. Will need close follow-up.  Plan: nicotine (COMMIT) 2 MG lozenge, nicotine         (NICODERM CQ) 14 MG/24HR 24 hr patch            No follow-ups on file.    Clair Teresa MD  [facility]  Precepted patient with Dr. Peggy Casper.      González Stephen is a 68 year old, presenting for the following health issues:  No chief complaint on file.      HPI     Here for follow-up on imaging findings  Patient with a significant history of atherosclerosis and longstanding leg pain in context of many years of smoking  At most recent wellness visit, underwent routine AAA screening, which showed iliac artery ectasia/aneurysm measured at 1.8cm bilaterally, a new finding  Patient  denies intermittent claudication, though he does get occasional cramping in his left leg  Still working on cutting back on smoking (tends to smoke cigarettes he finds on the ground)  Is able to walk, pain is manageable overall  Has some varicose veins in the left leg which are chronic  No chest pain, SOB, color change in his foot, worsening swelling, inability to move the foot, numbness, or tingling worse than his baseline    Review of Systems   Constitutional, HEENT, cardiovascular, pulmonary, gi and gu systems are negative, except as otherwise noted.      Objective    /83   Pulse 62   Temp 98  F (36.7  C) (Oral)   Resp 20   Wt 53.5 kg (118 lb)   SpO2 98%   BMI 20.90 kg/m    Body mass index is 20.9 kg/m .    Physical Exam   General: Alert and oriented x 3, no acute distress  Skin: clean, dry, and intact  HEENT: normocephalic, atraumatic, PERRL, EOMI, no conjunctival injection or icterus, ears and nose normal, no LAD or masses  Resp: clear to ausculation bilaterally, no rales or wheezes  Cardio: RRR, S1 and S2 present, mild systolic murmur present  Abdomen: soft, nontender, nondistended, bowel sounds present x 4, no masses, no hepatosplenomegaly  Extremities: Significant varicose veins present. Pulses diminished bilaterally, 1+ at best on left. No erythema, edema, or discoloration, leg is nontender to palpation today.  Psych: normal mood, normal mentation          ----- Service Performed and Documented by Resident or Fellow ------

## 2023-10-31 NOTE — PROGRESS NOTES
Preceptor Attestation:   Patient seen, evaluated and discussed with the resident. I have verified the content of the note, which accurately reflects my assessment of the patient and the plan of care.  Supervising Physician:Peggy Casper MD  [facility]

## 2023-11-01 DIAGNOSIS — L30.8 OTHER ECZEMA: ICD-10-CM

## 2023-11-01 NOTE — TELEPHONE ENCOUNTER
Message to physician:     Date of last visit: 10/31/2023    Date of next visit if scheduled:     Potassium   Date Value Ref Range Status   03/17/2023 5.2 3.4 - 5.3 mmol/L Final   02/17/2022 4.4 3.5 - 5.0 mmol/L Final   10/23/2020 4.5 3.4 - 5.3 mmol/L Final     Creatinine   Date Value Ref Range Status   03/17/2023 1.03 0.67 - 1.17 mg/dL Final   10/23/2020 0.8 0.8 - 1.5 mg/dL Final     GFR Estimate   Date Value Ref Range Status   03/17/2023 80 >60 mL/min/1.73m2 Final     Comment:     eGFR calculated using 2021 CKD-EPI equation.   06/25/2018 >60 >60 mL/min/1.73m2 Final       BP Readings from Last 3 Encounters:   10/31/23 132/83   10/18/23 112/65   09/15/23 109/74       Hemoglobin A1C   Date Value Ref Range Status   03/17/2023 5.6 0.0 - 5.6 % Final     Comment:     Normal <5.7%   Prediabetes 5.7-6.4%    Diabetes 6.5% or higher     Note: Adopted from ADA consensus guidelines.   05/20/2021 5.8 (H) 4.1 - 5.7 % Final       Please complete refill and CLOSE ENCOUNTER.  Closing the encounter signifies the refill is complete.  E

## 2023-11-02 ENCOUNTER — OFFICE VISIT (OUTPATIENT)
Dept: VASCULAR SURGERY | Facility: CLINIC | Age: 68
End: 2023-11-02
Attending: FAMILY MEDICINE
Payer: COMMERCIAL

## 2023-11-02 ENCOUNTER — ALLIED HEALTH/NURSE VISIT (OUTPATIENT)
Dept: FAMILY MEDICINE | Facility: CLINIC | Age: 68
End: 2023-11-02
Payer: COMMERCIAL

## 2023-11-02 ENCOUNTER — DOCUMENTATION ONLY (OUTPATIENT)
Dept: CARE COORDINATION | Facility: CLINIC | Age: 68
End: 2023-11-02

## 2023-11-02 VITALS
RESPIRATION RATE: 16 BRPM | HEIGHT: 63 IN | TEMPERATURE: 97.9 F | DIASTOLIC BLOOD PRESSURE: 78 MMHG | HEART RATE: 67 BPM | WEIGHT: 118.1 LBS | BODY MASS INDEX: 20.93 KG/M2 | OXYGEN SATURATION: 98 % | SYSTOLIC BLOOD PRESSURE: 114 MMHG

## 2023-11-02 DIAGNOSIS — I72.3 COMMON ILIAC ANEURYSM (H): Primary | ICD-10-CM

## 2023-11-02 DIAGNOSIS — Z72.0 TOBACCO USE: ICD-10-CM

## 2023-11-02 DIAGNOSIS — R01.1 SYSTOLIC MURMUR: ICD-10-CM

## 2023-11-02 DIAGNOSIS — E78.5 DYSLIPIDEMIA, GOAL LDL BELOW 70: ICD-10-CM

## 2023-11-02 DIAGNOSIS — I70.90 ATHEROSCLEROSIS: ICD-10-CM

## 2023-11-02 DIAGNOSIS — Z59.86 FINANCIAL INSECURITY: ICD-10-CM

## 2023-11-02 DIAGNOSIS — I83.892 SYMPTOMATIC VARICOSE VEINS OF LEFT LOWER EXTREMITY: ICD-10-CM

## 2023-11-02 DIAGNOSIS — Z82.49 FAMILY HISTORY OF BRAIN ANEURYSM: ICD-10-CM

## 2023-11-02 PROCEDURE — 99204 OFFICE O/P NEW MOD 45 MIN: CPT | Performed by: HOSPITALIST

## 2023-11-02 PROCEDURE — 99207 PR NO CHARGE NURSE ONLY: CPT

## 2023-11-02 PROCEDURE — G0463 HOSPITAL OUTPT CLINIC VISIT: HCPCS | Performed by: HOSPITALIST

## 2023-11-02 RX ORDER — ATORVASTATIN CALCIUM 40 MG/1
40 TABLET, FILM COATED ORAL DAILY
Qty: 30 TABLET | Refills: 11 | Status: SHIPPED | OUTPATIENT
Start: 2023-11-02 | End: 2024-01-25

## 2023-11-02 RX ORDER — ASPIRIN 81 MG/1
81 TABLET ORAL DAILY
Qty: 30 TABLET | Refills: 11 | Status: SHIPPED | OUTPATIENT
Start: 2023-11-02 | End: 2024-01-25

## 2023-11-02 SDOH — ECONOMIC STABILITY - INCOME SECURITY: FINANCIAL INSECURITY: Z59.86

## 2023-11-02 ASSESSMENT — ACTIVITIES OF DAILY LIVING (ADL): DEPENDENT_IADLS:: INDEPENDENT

## 2023-11-02 ASSESSMENT — PAIN SCALES - GENERAL: PAINLEVEL: MILD PAIN (3)

## 2023-11-02 NOTE — Clinical Note
Follow up 1 year with Dr. Joshi with aorta iliac ultrasound prior.  Order is in.  Ectatic common iliac arteries and LLE varicose veins.

## 2023-11-02 NOTE — PROGRESS NOTES
Clinic Care Coordination Contact  Clinic Care Coordination Contact  OUTREACH    Referral Information:  Referral Source: Care Team    Primary Diagnosis: Cardiovascular - other    Chief Complaint   Patient presents with    Clinic Care Coordination - Initial        Universal Utilization:   Clinic Utilization  Difficulty keeping appointments:: No  Compliance Concerns: No  No-Show Concerns: No  No PCP office visit in Past Year: No  Utilization      No Show Count (past year)  2             ED Visits  1             Hospital Admissions  0                    Current as of: 11/2/2023  4:28 AM                Clinical Concerns:  Current Medical Concerns:  Common iliac aneurysm; systolic murmur  Current Behavioral Concerns: tobacco use disorder; financial insecurity    Education Provided to patient: Care Coordination services; CAF form      Health Maintenance Reviewed: Due/Overdue   Clinical Pathway: None    Medication Management:  Medication review status: Medications reviewed and no changes reported per patient.           Functional Status:  Dependent ADLs:: Independent  Dependent IADLs:: Independent  Bed or wheelchair confined:: No  Mobility Status: Independent  Fallen 2 or more times in the past year?: No  Any fall with injury in the past year?: No    Living Situation:  Current living arrangement:: I live in a private home with spouse  Type of residence:: Apartment    Lifestyle & Psychosocial Needs:    Social Determinants of Health     Food Insecurity: Not on file   Depression: Not at risk (9/12/2023)    PHQ-2     PHQ-2 Score: 0   Housing Stability: Not on file   Tobacco Use: High Risk (11/2/2023)    Patient History     Smoking Tobacco Use: Every Day     Smokeless Tobacco Use: Never     Passive Exposure: Not on file   Financial Resource Strain: Not on file   Alcohol Use: Not on file   Transportation Needs: Not on file   Physical Activity: Not on file   Interpersonal Safety: Not on file   Stress: Not on file   Social  Connections: Not on file     Diet:: Regular  Inadequate nutrition (GOAL):: Yes  Tube Feeding: No  Inadequate activity/exercise (GOAL):: No  Significant changes in sleep pattern (GOAL): No  Transportation means:: Accessible car     Congregation or spiritual beliefs that impact treatment:: No  Mental health DX:: No  Mental health management concern (GOAL):: No  Chemical Dependency Status: No Current Concerns  Informal Support system:: Radhika based, Significant other      Resources and Interventions:  Current Resources:      Community Resources: Financial/Insurance  Supplies Currently Used at Home: None  Equipment Currently Used at Home: none  Employment Status: disabled    Advance Care Plan/Directive  Advanced Care Plans/Directives on file:: Yes  Status of record:: On File and Validated  Type Advanced Care Plans/Directives: Advanced Directive - On File    Referrals Placed: [facility] Plan:  Care Plan: Financial Wellbeing       Problem: Patient expresses financial resource strain                     Patient/Caregiver understanding: Yes.    Outreach Frequency: monthly, more frequently as needed  Future Appointments                In 4 months [facility]    In 4 months Janessa Yanes MD [facility] Radiation Oncology [facility]            Plan:   Patient to follow-up with  regarding communication received from [facility] pertaining to today's CAF form submission.    ISMA RICHARDS, ROBERT, LADC

## 2023-11-02 NOTE — PROGRESS NOTES
"[facility]        Patient is here for a consult to discuss ectasia of the common iliac arteries.  Patient is a smoker and has cut back to about 3 cigarettes per day.  Patient also reports he has varicose veins in BLE.  The patient has varicose veins that are problematic in bilateral legs. Symptoms patient has been experiencing are aching and cramps. Patient states their varicose veins are bothersome when standing,  working, and household chores. Patient has been using pain medication or anti-inflammatory's. Patient has not had recent imaging on legs done. Patient has not done conservative measures.Educated patient to work on conservative treatments.      Pt is currently taking no meds that would impact our treatment plan.    /78   Pulse 67   Temp 97.9  F (36.6  C)   Resp 16   Ht 5' 3\" (1.6 m)   Wt 118 lb 1.6 oz (53.6 kg)   SpO2 98%   BMI 20.92 kg/m      The provider has been notified that the patient has concerns of BLE pain, has varicose veins L>R.     Questions patient would like addressed today are: N/A.    Refills are needed: N/A    Has homecare services and agency name:  No           "

## 2023-11-02 NOTE — CONFIDENTIAL NOTE
[employer] faxed CAF form for Emergency Assistance to [facility] Financial Assistance this date. Copy to be scanned to patient chart.    ISMA RICHARDS, ROBERT, LADC

## 2023-11-02 NOTE — PROGRESS NOTES
VASCULAR MEDICINE CONSULT NOTE          LOCATION:  [facility]       Date of Service: 2023      Primary Care Provider: Zora Strong  Referring provider;  Peggy Casper      Reason for the visit/chief complaint:   Ectatic bilateral common iliac artery.      HPI:  Zafar Spencer is a pleasant 68 year old male who presents to our vascular medicine clinic accompanied by his significant other Myesha with the above-mentioned complaint.    Mr. Spencer has medical history including long history of tobacco smoking and untreated significant hyperlipidemia who recently underwent screening aortic ultrasound for AAA given his smoking history and age.  This revealed no abdominal aortic aneurysm however ectatic bilateral common iliac arteries at 1.8 cm.  He was subsequently referred to our clinic.    He denies intermittent claudications.  Does have varicose veins on the left lower extremity and does report occasional achiness of his left calf that is typically exertional when he walks but does not happen every time he walks and is very occasional and mild.    Denies postprandial abdominal pain.  Denies previous history of MI or stroke.    Mr. Spencer reports that he started smoking at age 10, was smoking up to 2 PPD, currently smoking 0.5 PPD with over 55-pack-year.    His cholesterol was only checked by PCP showing LDL of 199, HDL 51, TG 67 and an HDL of 212.  He was prescribed on atorvastatin 20 mg but he is yet to start it.    He was previously taking aspirin 81 mg (for unknown reasons) but was recently told by PCP to stop taking it especially that he was prescribed naproxen for arthritis pain.    No history of hypertension and he is not on any hypertensive medications.    Reports family history of ruptured brain aneurysm in his mother who eventually  at age 52 from recurrent strokes? (Was not a smoker).  No other family members with aneurysms.        REVIEW OF SYSTEMS:    A 12 point ROS was  reviewed and is negative except what is mentioned in HPI.       Past medical history, surgical history, medications, family history, social history and allergies were reviewed. Pertinent points mentioned under HPI.          OBJECTIVE:    Vital signs:  There were no vitals taken for this visit.  Wt Readings from Last 1 Encounters:   10/31/23 118 lb (53.5 kg)     There is no height or weight on file to calculate BMI.    Physical exam:  General appearance: Pleasant male in no apparent distress.    HEENT: NC/AT.    Neck: Carotids +2/2 bilaterally without bruits.  No jugular venous distension.   Heart: RRR. Normal S1, S2.   Chest: Clear to auscultation bilaterally.  Abdomen: Soft, nontender, nondistended. No pulsatile mass.  No bruits.   Extremities: No lower extremity edema.  Prominent varicosities noted on the left lower medial thigh and leg.  Easily compressible.  Nontender.  No redness or warmth.   Skin: No stasis dermatitis or lipodermatosclerosis.  No wounds.    Upper extremity vascular pulse exam, no subclavian bruit.  Symmetrical palpable radial pulse bilaterally.  Lower extremity vascular pulse exam:  Femoral 2/2 on the right with no bruit, 0/2 on the left with no bruit.  Popliteal 2/2 bilaterally.  DP and PT 2/2 bilaterally.    Neurological: Alert, awake and oriented       DIAGNOSTIC STUDIES:   Labs and diagnostics reviewed including outside records. Pertinent points are mentioned under HPI and assessment and plan sections.      ASSESSMENT AND PLAN:    Ectatic bilateral common iliac arteries  Longstanding smoking history with over 55-pack-year currently smoking 0.5 PPD  Untreated significant dyslipidemia LDL >190   Family history of ruptured brain aneurysm  Mildly symptomatic left lower extremity varicose veins    Reviewing his previous CT chest, he does have a significant atherosclerosis.  In addition, I was able to review an old CT abdomen from 2018 showing extensive aortoiliac atherosclerosis.  He currently  denies any intermittent claudication.  His left calf achiness/heaviness does not appear to be consistent with intermittent claudication and is very sporadic and mild.  At the same time, he does have prominent varicose veins on the left.  I was unable to feel his femoral pulse on the left, no femoral bruit with palpable popliteal, DP and PT 2/2.  He was prescribed compression stockings and would reevaluate if this makes his pain better.      We discussed the results of his ectatic common iliac arteries at 1.8 cm.  This will need ongoing surveillance.  He has no infra abdominal aortic aneurysm.  No concern for popliteal aneurysm on exam.  We would recommend repeat aortoiliac ultrasound in 1 year.    We discussed the importance of medical management for his diffuse atherosclerosis.  We alerted him about the significantly elevated LDL and cholesterol panel.  We would recommend increasing his atorvastatin to high intensity dose.  I prescribed 40 mg and cancel his previous atorvastatin 20 mg ordered by his PCP.  Patient, we recommend aspirin 81 mg daily given the extensive atherosclerosis.  I asked him to start aspirin 81 mg and they needed a new prescription.  I asked him to take acetaminophen for his arthritic pain and stop naproxen.    We spent some time talking about the importance of smoking cessation.  We will be providing them with some educational and resources.      Recommendations:  Repeat ultrasound aortoiliac in 1 year with follow-up appointment in our clinic.  Smoking cessation is of utmost importance.  Was provided with quit partner resources.  Start taking atorvastatin 40 mg daily.  Prescribed and the old prescription of 20 mg was canceled.  Restart taking aspirin 81 mg for diffuse atherosclerosis.  Counseled to not to take naproxen while on aspirin and manage his arthritic pain with acetaminophen.  He will discuss with PCP if this does not work.  He was counseled about the safe use of acetaminophen.  We  will repeat lipid panel in 2 months to assess improvement.  Order placed.  I anticipate we might need to either further go up on the dose or add ezetimibe.  We will assess as we see the results.  Discussed healthy eating and discussed Mediterranean diet.  Obtain CT head and neck to screen for intracranial aneurysms based on his family history and smoking history.  Sent echocardiogram with no evidence of thoracic aortic aneurysm.  Start wearing 20-30 mmHg compression stocking to the left lower extremity.      It was a pleasure meeting with Mr. Spencer and his significant other Myesha in our clinic today.    Lex Joshi MD  Vascular Medicine  November 2, 2023

## 2023-11-02 NOTE — PATIENT INSTRUCTIONS
Juanito Stephen,    Thank you for entrusting your care with us today. After your visit today with MD Lex Joshi this is the plan that was discussed at your appointment.    Your atorvastatin dose was increased.  Take 40mg atorvastatin daily.  Make sure to  the 40mg dose.     Stop taking naproxen for your arthritis pain, take acetaminophen (tylenol) instead.      Start taking aspirin 81mg daily.     Wear graduated compression stockings 20-30mmHg daily for your varicose veins.  See below for more information on use.     See below for more information on smoking cessation resources.    Follow up in 1 year with Dr. Joshi and ultrasound prior.  A  will reach out to you closer to that time to schedule.    I am including additional information on these things and our contact information if you have any questions or concerns.   Please do not hesitate to reach out to us if you felt we did not answer your questions or you are unsure of the treatment plan after your visit today. Our number is [phone].Thank you for trusting us with your care.     [employer] is for any [facility] looking for free support to quit smoking, vaping or chewing.   [employer] will offer many quit support options and resources so [place] residents can continue to find the way to quit that works best for them.   Free support includes personalized coaching, email and text support, educational materials, and quit medication (nicotine patches, gum or lozenges) delivered by mail.     Contact [employer] at 6-080-QUITNOW or online at [employer] to receive support on your quit journey.        Wear your compression stockings every day; put them on first thing in the morning and remove at bedtime    Replace your compression stockings every 4-6 months; these garments will lose their elasticity and become ineffective    Hang your stockings, do not put them in the dryer.  This will help prolong the life of your compressions  stockings.

## 2023-11-03 RX ORDER — TRIAMCINOLONE ACETONIDE 1 MG/G
CREAM TOPICAL 2 TIMES DAILY
Qty: 80 G | Refills: 1 | Status: SHIPPED | OUTPATIENT
Start: 2023-11-03 | End: 2024-04-29

## 2023-11-07 ENCOUNTER — TELEPHONE (OUTPATIENT)
Dept: FAMILY MEDICINE | Facility: CLINIC | Age: 68
End: 2023-11-07
Payer: COMMERCIAL

## 2023-11-13 ENCOUNTER — PATIENT OUTREACH (OUTPATIENT)
Dept: CARE COORDINATION | Facility: CLINIC | Age: 68
End: 2023-11-13
Payer: COMMERCIAL

## 2023-11-13 NOTE — PROGRESS NOTES
Clinic Care Coordination Contact  Follow Up Progress Note      Assessment: I got a vm for the pt significant other, she called about her and the pt getting evicted. I told her that their manager will have to go to court, which will take about 15 days, then once he goes to court, they have 30 days after the evictions to kick them out. I told her that she has some time, they need to find a new place. I gave them [employer] phone number to call. They have or know about Section 8, subsidized housing, and other places. There is not much help for them, since they can not continue to keep up with rent. I told her that they will only help them, only if they can keep up their rent, and is just behind. I told her to call the [employer] and see. I told her to give me a call if she needs anything else.     Care Gaps:    Health Maintenance Due   Topic Date Due    COPD ACTION PLAN  Never done    ZOSTER IMMUNIZATION (1 of 2) Never done    RSV VACCINE (Pregnancy & 60+) (1 - 1-dose 60+ series) Never done    COVID-19 Vaccine (3 - Pfizer risk series) 07/03/2021    DTAP/TDAP/TD IMMUNIZATION (3 - Td or Tdap) 10/19/2022           Care Plans      Intervention/Education provided during outreach:               Plan:     Care Coordinator will follow up in

## 2023-11-14 NOTE — PROGRESS NOTES
The pt significant other stated that she would not appreciate Laith, hitting on the pt, or complementing the pt while she was present. She just felt that it was disrespectful to her.

## 2023-11-16 ENCOUNTER — PATIENT OUTREACH (OUTPATIENT)
Dept: CARE COORDINATION | Facility: CLINIC | Age: 68
End: 2023-11-16
Payer: COMMERCIAL

## 2023-11-16 NOTE — PROGRESS NOTES
Clinic Care Coordination Contact  Follow Up Progress Note      Assessment: I called the pt for a follow up with her housing. I talked to the pt significant other, she stated that [employer] is not going to help them with rent. I told her that any agecny within [facility] will only help them only once every 14 months. I know that she has applied for help before, and it has not been a year yet. I ask them to see if she has called the [employer], like I told her. Pt stated that she has not, and wanted the phone number to call. I gave her [employer] number to call. I told her to also see someone about learning to budget their money. She stated that she is going to talk to her , and see if he can help them with budgeting. I told her to give me a call, if she needs anything else.     Care Gaps:    Health Maintenance Due   Topic Date Due    COPD ACTION PLAN  Never done    ZOSTER IMMUNIZATION (1 of 2) Never done    RSV VACCINE (Pregnancy & 60+) (1 - 1-dose 60+ series) Never done    COVID-19 Vaccine (3 - Pfizer risk series) 07/03/2021    DTAP/TDAP/TD IMMUNIZATION (3 - Td or Tdap) 10/19/2022           Care Plans      Intervention/Education provided during outreach:               Plan:     Care Coordinator will follow up in

## 2023-11-30 ENCOUNTER — TELEPHONE (OUTPATIENT)
Dept: FAMILY MEDICINE | Facility: CLINIC | Age: 68
End: 2023-11-30

## 2023-11-30 ENCOUNTER — OFFICE VISIT (OUTPATIENT)
Dept: FAMILY MEDICINE | Facility: CLINIC | Age: 68
End: 2023-11-30
Payer: COMMERCIAL

## 2023-11-30 VITALS
TEMPERATURE: 98.6 F | HEART RATE: 78 BPM | DIASTOLIC BLOOD PRESSURE: 64 MMHG | SYSTOLIC BLOOD PRESSURE: 101 MMHG | OXYGEN SATURATION: 96 %

## 2023-11-30 DIAGNOSIS — Z23 NEED FOR PROPHYLACTIC VACCINATION AND INOCULATION AGAINST INFLUENZA: ICD-10-CM

## 2023-11-30 DIAGNOSIS — R63.8 CONCERN ABOUT FOOD OR NUTRITION: ICD-10-CM

## 2023-11-30 DIAGNOSIS — K08.109 EDENTULOUS: Primary | ICD-10-CM

## 2023-11-30 PROCEDURE — 90480 ADMN SARSCOV2 VAC 1/ONLY CMP: CPT

## 2023-11-30 PROCEDURE — 90662 IIV NO PRSV INCREASED AG IM: CPT

## 2023-11-30 PROCEDURE — 99214 OFFICE O/P EST MOD 30 MIN: CPT | Mod: 25

## 2023-11-30 PROCEDURE — G0008 ADMIN INFLUENZA VIRUS VAC: HCPCS

## 2023-11-30 PROCEDURE — 91320 SARSCV2 VAC 30MCG TRS-SUC IM: CPT

## 2023-11-30 RX ORDER — FEEDER CONTAINER WITH PUMP SET
1 EACH MISCELLANEOUS
Qty: 21330 ML | Refills: 0 | Status: SHIPPED | OUTPATIENT
Start: 2023-11-30 | End: 2023-12-30

## 2023-11-30 NOTE — TELEPHONE ENCOUNTER
[facility] phone call message- general phone call:    Reason for call: Spouse of patient checking in, wanted to hear back from Laith in regards to patient's case.. Please reach out to spouse/patient.     Return call needed: Yes    OK to leave a message on voice mail? Yes    Primary language: English      needed? No    Call taken on November 30, 2023 at 1:43 PM by Deidre Odell

## 2023-11-30 NOTE — PROGRESS NOTES
Assessment & Plan   Zafar Spencer is a 68 year old male presenting with concerns of bleeding from a tooth extraction site. Here today with his partner.     Dental Concern s/p Teeth Extraction   Edentulous   Nutrition Concern   Recent dental extractions day prior to this appointment. Especially considering recency of this procedure, site looks reassuring. Pain reportedly well controlled with pain medication regimen prescribed by dentist. Patient partner concerned about need for IV fluids, IV antibiotics -- reassured her that this is not indicated at this time. Main concern is nutrition going forward as patient report to not have plan for denture fitting in the near future. Have ordered Ensure. Have also connected the patient with social work, who will assist in connecting the patient with nutrition resources. Possibly consider formal nutrition consult in the future pending timeframe of denture fitting.    - Ensure    - Social work assisting patient re: food resources    - Consider formal nutrition consult if period of being edentulous is prolonged     Healthcare Maintenance   Due for a number of healthcare maintenance items. Patient to schedule preventive visit at their earliest convenience.    - Covid vaccine today    - Flu vaccine today    - Will consider zoster and RSV in the future     González Stephen is a 68 year old, presenting for the following health issues:  tooth extraction (All Front tooths remove causing head aches ) and Imm/Inj (Flu Shot)    HPI   Patient and patient partner present today.   Bleeding from tooth extraction site. 8 teeth pulled yesterday. Unclear as to reason   Bleeding for two hours following procedure, but has now completely resolved.   Has not been able to eat or drink, no plan in place for nutrition.   Does not have dentures, thinks he may be getting fit for dentures in 1-2 months.   Pain medication provided by dental professional, feels well controlled at this time.   Wife is  concerned about infection, need for IV antibiotics, dehydration, need for IV fluids   Patient vitally stable, per patient description does not appear to have lost much blood   Reassured patient and patients partner that hospitalization and IV abx/fluids are not indicated at this time     Review of Systems   ROS negative aside from concerns noted in HPI.       Objective    /64   Pulse 78   Temp 98.6  F (37  C)   SpO2 96%   There is no height or weight on file to calculate BMI.  Physical Exam   General: Sitting comfortably. No acute distress. Partner accompanying patient.   HEENT: Oral exam notable for recent extractions of all upper teeth. No active bleeding, some clotting.   Neck: No masses appreciated.  Respiratory: No respiratory distress. Lung sounds are clear without rales, ronchi, or wheezes.   Cardiac: RRR. No murmurs.   Abdominal: Abdomen is soft and non-tender without distention.  Extremities: Upper and lower extremities grossly normal.  Skin: Skin in warm without rashes.  Neurological: Motor function is grossly normal.  Psychiatric: Good insight.    Norma Mckeon MD PGY-2  [facility]

## 2023-11-30 NOTE — PROGRESS NOTES
Prior to immunization administration, verified patients identity using patient s name and date of birth. Please see Immunization Activity for additional information.     Screening Questionnaire for Adult Immunization    Are you sick today?   No   Do you have allergies to medications, food, a vaccine component or latex?   No   Have you ever had a serious reaction after receiving a vaccination?   No   Do you have a long-term health problem with heart, lung, kidney, or metabolic disease (e.g., diabetes), asthma, a blood disorder, no spleen, complement component deficiency, a cochlear implant, or a spinal fluid leak?  Are you on long-term aspirin therapy?   No   Do you have cancer, leukemia, HIV/AIDS, or any other immune system problem?   No   Do you have a parent, brother, or sister with an immune system problem?   No   In the past 3 months, have you taken medications that affect  your immune system, such as prednisone, other steroids, or anticancer drugs; drugs for the treatment of rheumatoid arthritis, Crohn s disease, or psoriasis; or have you had radiation treatments?   No   Have you had a seizure, or a brain or other nervous system problem?   No   During the past year, have you received a transfusion of blood or blood    products, or been given immune (gamma) globulin or antiviral drug?   No   For women: Are you pregnant or is there a chance you could become       pregnant during the next month?   No   Have you received any vaccinations in the past 4 weeks?   No     Immunization questionnaire answers were all negative.      Patient instructed to remain in clinic for 15 minutes afterwards, and to report any adverse reactions.     Screening performed by Sejal Solomon on 11/30/2023 at 2:56 PM.

## 2023-12-04 ENCOUNTER — PATIENT OUTREACH (OUTPATIENT)
Dept: CARE COORDINATION | Facility: CLINIC | Age: 68
End: 2023-12-04
Payer: COMMERCIAL

## 2023-12-04 NOTE — PROGRESS NOTES
Clinic Care Coordination Contact  Follow Up Progress Note      Assessment: The pt came in to have Laith help apply for financial help. He fax the paperwork to the [facility]. I had talked to the pt wife, I reclaim that the pt was denied. I told her that since she got help from the [facility] before, and the pt and her are in the same household, they will denied it. They will only help a household once every 14 months. I called the pt wife today, but got her vm, so I left a vm informing her of this.     Care Gaps:    Health Maintenance Due   Topic Date Due    COPD ACTION PLAN  Never done    ZOSTER IMMUNIZATION (1 of 2) Never done    RSV VACCINE (Pregnancy & 60+) (1 - 1-dose 60+ series) Never done    DTAP/TDAP/TD IMMUNIZATION (3 - Td or Tdap) 10/19/2022           Care Plans      Intervention/Education provided during outreach:               Plan:     Care Coordinator will follow up in

## 2023-12-28 ENCOUNTER — OFFICE VISIT (OUTPATIENT)
Dept: FAMILY MEDICINE | Facility: CLINIC | Age: 68
End: 2023-12-28
Payer: COMMERCIAL

## 2023-12-28 VITALS
TEMPERATURE: 98.6 F | OXYGEN SATURATION: 99 % | WEIGHT: 117 LBS | HEART RATE: 81 BPM | BODY MASS INDEX: 20.73 KG/M2 | SYSTOLIC BLOOD PRESSURE: 113 MMHG | DIASTOLIC BLOOD PRESSURE: 79 MMHG

## 2023-12-28 DIAGNOSIS — R63.8 CONCERN ABOUT FOOD OR NUTRITION: Primary | ICD-10-CM

## 2023-12-28 DIAGNOSIS — R63.4 WEIGHT LOSS: ICD-10-CM

## 2023-12-28 PROCEDURE — 99213 OFFICE O/P EST LOW 20 MIN: CPT | Mod: GC

## 2023-12-28 NOTE — PATIENT INSTRUCTIONS
- Recommend Zoster, RSV, TDAP vaccines at pharmacy     - Work on eating nutrient-dense foods (beans, avocados, vegetables, eggs)

## 2023-12-28 NOTE — PROGRESS NOTES
Assessment & Plan     Concern about food or nutrition  Weight loss  Patient and his partner present today to discuss nutrition concerns.  Patient recently had several teeth extracted, and they are worried that he is not getting adequate nutrient intake due to not being able to chew his food well.  He is able to swallow several different kinds of food as long as he cuts up small enough.  Patient has lost 3 pounds in the last 2 months.  Long discussion today about prioritizing nutrient dense foods. Patient currently eating a lot of empty calories, such as chips and other highly processed foods. Provided a list of nutrient rich foods, which they plan to bring to the grocery store.  At last visit, prescribed Ensure supplements, but these were too expensive for patient to .  Offered nutrition referral to help patient find healthy foods that he can safely eat despite his lack of teeth, but patient and partner would like to attempt to improve diet on their own first.  -Provided list of nutrient rich foods (eggs, avocado, beans, meats, etc.)  -Follow-up in 4 weeks to check in on weight  -If patient is continuing to lose weight, would consider nutrition referral at that time    Health maintenance  - Due for Zoster, RSV, TDAP vaccines at pharmacy    Return in about 4 weeks (around 1/25/2024) for weight loss .    Zora Strong MD  [facility]    González Stephen is a 68 year old, presenting for the following health issues:  RECHECK (Recheck weight )        12/28/2023     8:28 AM   Additional Questions   Roomed by zac gama   Accompanied by spouse       HPI     PMH: lung adenocarcinoma (s/p treatment in 2022, now in remission), tobacco use disorder, hyperlipidemia     Presents to clinic with partner Myesha to discuss weight loss.     Weight Loss  Partner Myesha very concerned about nutrition and weight at recent appointment following dental extractions.   Dr Mckeon ordered Ensure and  consulted SW for food resources.   Unfortunately unable to  Ensure or Boost due to cost.   Patient recently had several teeth removed at the dentist.  Having fewer teeth has made it more difficult for him to chew foods.  He does still have some teeth.  He is able to safely swallow most foods if he cuts them up small enough.  He has lost 3 pounds since October.    Uses nicotine patch.   Smoking about 3 cigarettes daily     CT chest coming up 3/4/23 for lung cancer surveillance    SW concern - they owe $65 to [facility] that they can't pay.   Dentist won't order his dentures until he has paid the $65.   Does have dental insurance.     Review of Systems   Constitutional, HEENT, cardiovascular, pulmonary, gi and gu systems are negative, except as otherwise noted.      Objective    /79   Pulse 81   Temp 98.6  F (37  C) (Oral)   Wt 53.1 kg (117 lb)   SpO2 99%   BMI 20.73 kg/m    Body mass index is 20.73 kg/m .    Wt Readings from Last 4 Encounters:   12/28/23 53.1 kg (117 lb)   11/02/23 53.6 kg (118 lb 1.6 oz)   10/31/23 53.5 kg (118 lb)   10/18/23 54.4 kg (120 lb)        Physical Exam   GENERAL: healthy, alert and no distress  HENT: ear canals and TM's normal, nose and mouth without ulcers or lesions  NECK: no adenopathy, no asymmetry, masses, or scars and thyroid normal to palpation  RESP: lungs clear to auscultation - no rales, rhonchi or wheezes  CV: regular rate and rhythm, normal S1 S2, no S3 or S4, no murmur, click or rub, no peripheral edema and peripheral pulses strong  MS: no gross musculoskeletal defects noted, no edema    ----- Service Performed and Documented by Resident or Fellow ------

## 2024-01-18 DIAGNOSIS — Z72.0 SMOKING TRYING TO QUIT: ICD-10-CM

## 2024-01-19 RX ORDER — BUPROPION HYDROCHLORIDE 150 MG/1
150 TABLET, EXTENDED RELEASE ORAL 2 TIMES DAILY
Qty: 180 TABLET | Refills: 0 | Status: SHIPPED | OUTPATIENT
Start: 2024-01-19 | End: 2024-01-25

## 2024-01-25 ENCOUNTER — OFFICE VISIT (OUTPATIENT)
Dept: FAMILY MEDICINE | Facility: CLINIC | Age: 69
End: 2024-01-25
Payer: COMMERCIAL

## 2024-01-25 VITALS
BODY MASS INDEX: 20.14 KG/M2 | TEMPERATURE: 98.2 F | OXYGEN SATURATION: 98 % | HEIGHT: 64 IN | HEART RATE: 74 BPM | WEIGHT: 118 LBS | DIASTOLIC BLOOD PRESSURE: 72 MMHG | SYSTOLIC BLOOD PRESSURE: 102 MMHG

## 2024-01-25 DIAGNOSIS — I72.3 COMMON ILIAC ANEURYSM (H): ICD-10-CM

## 2024-01-25 DIAGNOSIS — Z72.0 SMOKING TRYING TO QUIT: ICD-10-CM

## 2024-01-25 DIAGNOSIS — R63.8 CONCERN ABOUT FOOD OR NUTRITION: Primary | ICD-10-CM

## 2024-01-25 DIAGNOSIS — R63.4 WEIGHT LOSS: ICD-10-CM

## 2024-01-25 DIAGNOSIS — I70.90 ATHEROSCLEROSIS: ICD-10-CM

## 2024-01-25 DIAGNOSIS — E78.5 DYSLIPIDEMIA, GOAL LDL BELOW 70: ICD-10-CM

## 2024-01-25 LAB
CHOLEST SERPL-MCNC: 130 MG/DL
FASTING STATUS PATIENT QL REPORTED: NORMAL
HDLC SERPL-MCNC: 47 MG/DL
LDLC SERPL CALC-MCNC: 72 MG/DL
NONHDLC SERPL-MCNC: 83 MG/DL
TRIGL SERPL-MCNC: 57 MG/DL

## 2024-01-25 PROCEDURE — 99214 OFFICE O/P EST MOD 30 MIN: CPT | Mod: GC

## 2024-01-25 PROCEDURE — 80061 LIPID PANEL: CPT

## 2024-01-25 PROCEDURE — 36415 COLL VENOUS BLD VENIPUNCTURE: CPT

## 2024-01-25 RX ORDER — ATORVASTATIN CALCIUM 40 MG/1
40 TABLET, FILM COATED ORAL DAILY
Qty: 30 TABLET | Refills: 11 | Status: SHIPPED | OUTPATIENT
Start: 2024-01-25 | End: 2024-03-22

## 2024-01-25 RX ORDER — ASPIRIN 81 MG/1
81 TABLET ORAL DAILY
Qty: 30 TABLET | Refills: 11 | Status: SHIPPED | OUTPATIENT
Start: 2024-01-25

## 2024-01-25 RX ORDER — BUPROPION HYDROCHLORIDE 150 MG/1
150 TABLET, EXTENDED RELEASE ORAL 2 TIMES DAILY
Qty: 180 TABLET | Refills: 0 | Status: SHIPPED | OUTPATIENT
Start: 2024-01-25 | End: 2024-03-22

## 2024-01-25 NOTE — PROGRESS NOTES
Faculty Supervision of Residents   I have examined this patient and the medical care has been evaluated and discussed with the resident. See resident note outlining our discussion.    Discussed smoking cessation, check lipids.    Brooke Marsh MD

## 2024-01-25 NOTE — LETTER
February 1, 2024      Zafar Spencer  [address]  [address]  [place] [address]        Dear ,    We are writing to inform you of your test results.    Your cholesterol looks MUCH better than the last time we checked it. Please continue taking your rosuvastatin.       Please let us know if you have any questions or concerns.       Resulted Orders   Lipid Profile   Result Value Ref Range    Cholesterol 130 <200 mg/dL    Triglycerides 57 <150 mg/dL    Direct Measure HDL 47 >=40 mg/dL    LDL Cholesterol Calculated 72 <=100 mg/dL    Non HDL Cholesterol 83 <130 mg/dL    Patient Fasting > 8hrs? Unknown     Narrative    Cholesterol  Desirable:  <200 mg/dL    Triglycerides  Normal:  Less than 150 mg/dL  Borderline High:  150-199 mg/dL  High:  200-499 mg/dL  Very High:  Greater than or equal to 500 mg/dL    Direct Measure HDL  Female:  Greater than or equal to 50 mg/dL   Male:  Greater than or equal to 40 mg/dL    LDL Cholesterol  Desirable:  <100mg/dL  Above Desirable:  100-129 mg/dL   Borderline High:  130-159 mg/dL   High:  160-189 mg/dL   Very High:  >= 190 mg/dL    Non HDL Cholesterol  Desirable:  130 mg/dL  Above Desirable:  130-159 mg/dL  Borderline High:  160-189 mg/dL  High:  190-219 mg/dL  Very High:  Greater than or equal to 220 mg/dL       If you have any questions or concerns, please call the clinic at the number listed above.       Sincerely,      Brooke Marsh MD

## 2024-01-25 NOTE — PROGRESS NOTES
Assessment & Plan     Concern about food or nutrition  Weight loss  Patient and his partner present today to discuss nutrition concerns.  Patient recently had several teeth extracted, and they are worried that he is not getting adequate nutrient intake due to not being able to chew his food well.  He is able to swallow almost all foods as long as he cuts up small enough. Have been encouraging nutrient-dense foods. Weight has now been stable since October.  - Continue nutrient dense foods.   - Continue to monitor weight at future visits     Smoking trying to quit  Smoking 5-6 cigarettes daily. Has been prescribed wellbutrin for smoking cessation - patient thinks he is taking this but isn't certain. Refilled today. He has tried nicotine patches/lozenges but doesn't think they have helped. Discussed importance of tobacco cessation, especially in the setting of patient's severe atherosclerosis and hyperlipidemia, as well as his lung adenocarcinoma history.   - buPROPion (WELLBUTRIN SR) 150 MG 12 hr tablet  Dispense: 180 tablet; Refill: 0    Dyslipidemia, goal LDL below 70  Most recent lipid profile with  on 10/18/23. On atorvastatin 40 mg daily since 11/2/23. Due for repeat lipids today. Need tight control particularly with patient's known diffuse atherosclerosis.   - atorvastatin (LIPITOR) 40 MG tablet  Dispense: 30 tablet; Refill: 11  - Lipid Profile  - Would increase atorvastatin or add ezetimibe if LDL still > 70    Atherosclerosis  Ectatic common iliac arteries  Ectatic common iliac arteries. Diffuse atherosclerosis noted on CT. Saw vascular clinic in Nov 2023. Recommended repeat aortoiliac US in 1 year (Nov 2024). Also recommended aspirin, lipid control, smoking cessation.  Vascular also ordered a CTA head/neck to screen for intracranial aneurysms based on his family history and smoking history.  This has not been scheduled. Will send message to referrals team to help coordinate.   - Lipid control as  "above   - Continue aspirin   - Repeat aortoiliac US and follow-up with vascular clinic in Nov 2024   - aspirin 81 MG EC tablet  Dispense: 30 tablet; Refill: 11    Follow-up in 4 weeks.     González Stephen is a 68 year old, presenting for the following health issues:  Ear Problem (Ear infection) and Recheck Medication (Go over meds)        1/25/2024     9:28 AM   Additional Questions   Roomed by khris blair   Accompanied by eze ROD     PMH: lung adenocarcinoma (s/p treatment in 2022, now in remission), tobacco use disorder, hyperlipidemia     Smoking 5-6 cigarettes daily - does not like nicotine patches or gums.   On wellbutrin which has helped him cut down.   He did not like the nicotine patches or lozenges -- too expensive and didn't really help.     Weight now stable. Has been eating more nutritious foods per patient - not really specifying what he eats.     Wt Readings from Last 4 Encounters:   01/25/24 53.5 kg (118 lb)   12/28/23 53.1 kg (117 lb)   11/02/23 53.6 kg (118 lb 1.6 oz)   10/31/23 53.5 kg (118 lb)         Objective    /72   Pulse 74   Temp 98.2  F (36.8  C)   Ht 1.62 m (5' 3.78\")   Wt 53.5 kg (118 lb)   SpO2 98%   BMI 20.39 kg/m    Body mass index is 20.39 kg/m .  Physical Exam   GENERAL: alert and no distress  NECK: no adenopathy, no asymmetry, masses, or scars  RESP: lungs clear to auscultation - no rales, rhonchi or wheezes  CV: regular rate and rhythm, normal S1 S2, no S3 or S4, no murmur, click or rub, no peripheral edema  ABDOMEN: soft, nontender, no hepatosplenomegaly, no masses and bowel sounds normal  MS: no gross musculoskeletal defects noted, no edema          Signed Electronically by: Zora Strong MD    "

## 2024-02-21 NOTE — PROGRESS NOTES
"  Assessment & Plan     Concern about food or nutrition  Weight loss  Patient and his partner present today to follow-up on nutrition concerns.  Patient recently had several teeth extracted, and they are worried that he is not getting adequate nutrient intake due to not being able to chew his food well.  He is able to swallow almost all foods as long as he cuts up small enough. Have been encouraging nutrient-dense foods. Weight is now up 3 lbs today!   - Continue nutrient dense foods.   - Continue to monitor weight at future visits      Smoking trying to quit  Smoking 2-3 cigarettes daily, which is an improvement from last visit. Has been prescribed wellbutrin BID for smoking cessation - has been only taking daily. He has tried nicotine patches/lozenges but doesn't think they have helped. Discussed importance of tobacco cessation, especially in the setting of patient's severe atherosclerosis and hyperlipidemia, as well as his lung adenocarcinoma history.   - Increase Wellbutrin to twice daily     Follow-up in 6 weeks.     González Stephen is a 68 year old, presenting for the following health issues:  RECHECK and Recheck Medication (Medication questions)        2/22/2024    10:05 AM   Additional Questions   Roomed by khris blair   Accompanied by wife         2/22/2024    Information    services provided? No     HPI     PMH: lung adenocarcinoma (s/p radiation in 2022, now in remission), tobacco use disorder, hyperlipidemia     Breathing has been pretty good overnight.     Smoking about 2-3 cigarettes, which is an improvement from last time. Only taking Chantix once daily instead of twice daily.         Objective    Pulse 65   Temp 97.2  F (36.2  C)   Ht 1.6 m (5' 2.99\")   Wt 54 kg (119 lb)   SpO2 98%   BMI 21.09 kg/m    Body mass index is 21.09 kg/m .  Physical Exam   GENERAL: alert and no distress  NECK: no adenopathy, no asymmetry, masses, or scars  RESP: lungs clear to auscultation - " no rales, rhonchi or wheezes  CV: regular rate and rhythm, normal S1 S2, no S3 or S4, no murmur, click or rub, no peripheral edema  ABDOMEN: soft, nontender, no hepatosplenomegaly, no masses and bowel sounds normal  MS: no gross musculoskeletal defects noted, no edema    Wt Readings from Last 4 Encounters:   02/22/24 54 kg (119 lb)   01/25/24 53.5 kg (118 lb)   12/28/23 53.1 kg (117 lb)   11/02/23 53.6 kg (118 lb 1.6 oz)              Signed Electronically by: Zora Strong MD

## 2024-02-22 ENCOUNTER — OFFICE VISIT (OUTPATIENT)
Dept: FAMILY MEDICINE | Facility: CLINIC | Age: 69
End: 2024-02-22
Payer: COMMERCIAL

## 2024-02-22 VITALS
HEIGHT: 63 IN | BODY MASS INDEX: 21.09 KG/M2 | WEIGHT: 119 LBS | TEMPERATURE: 97.2 F | HEART RATE: 65 BPM | OXYGEN SATURATION: 98 %

## 2024-02-22 DIAGNOSIS — Z72.0 SMOKING TRYING TO QUIT: ICD-10-CM

## 2024-02-22 DIAGNOSIS — R63.8 CONCERN ABOUT FOOD OR NUTRITION: Primary | ICD-10-CM

## 2024-02-22 DIAGNOSIS — R63.4 WEIGHT LOSS: ICD-10-CM

## 2024-02-22 PROCEDURE — 99214 OFFICE O/P EST MOD 30 MIN: CPT | Mod: GC

## 2024-02-26 NOTE — PROGRESS NOTES
Preceptor Attestation:  Patient's case reviewed and discussed with Zora Strong MD resident and I evaluated the patient. I agree with written assessment and plan of care.  Supervising Physician:  BASSAM COREAS MD  [facility]

## 2024-03-04 ENCOUNTER — HOSPITAL ENCOUNTER (OUTPATIENT)
Dept: CT IMAGING | Facility: HOSPITAL | Age: 69
Discharge: HOME | End: 2024-03-04
Attending: RADIOLOGY | Admitting: RADIOLOGY
Payer: COMMERCIAL

## 2024-03-04 DIAGNOSIS — C34.12 MALIGNANT NEOPLASM OF UPPER LOBE OF LEFT LUNG (H): ICD-10-CM

## 2024-03-04 PROCEDURE — 71250 CT THORAX DX C-: CPT

## 2024-03-07 ENCOUNTER — OFFICE VISIT (OUTPATIENT)
Dept: RADIATION ONCOLOGY | Facility: HOSPITAL | Age: 69
End: 2024-03-07
Attending: RADIOLOGY
Payer: COMMERCIAL

## 2024-03-07 VITALS
OXYGEN SATURATION: 97 % | SYSTOLIC BLOOD PRESSURE: 91 MMHG | HEART RATE: 80 BPM | BODY MASS INDEX: 21.35 KG/M2 | WEIGHT: 120.5 LBS | RESPIRATION RATE: 20 BRPM | DIASTOLIC BLOOD PRESSURE: 70 MMHG | TEMPERATURE: 98.1 F

## 2024-03-07 DIAGNOSIS — C34.12 MALIGNANT NEOPLASM OF UPPER LOBE OF LEFT LUNG (H): Primary | ICD-10-CM

## 2024-03-07 PROCEDURE — G0463 HOSPITAL OUTPT CLINIC VISIT: HCPCS | Performed by: RADIOLOGY

## 2024-03-07 PROCEDURE — 99214 OFFICE O/P EST MOD 30 MIN: CPT | Performed by: RADIOLOGY

## 2024-03-07 ASSESSMENT — PAIN SCALES - GENERAL: PAINLEVEL: NO PAIN (0)

## 2024-03-07 NOTE — LETTER
3/7/2024         RE: Zafar Spencer  [address]  [address] [place] [id number]        Dear Colleague,    Thank you for referring your patient, Zafar Spencer, to the [facility] RADIATION ONCOLOGY [place]. Please see a copy of my visit note below.    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  The patient is not a good candidate for surgery given his poor medical status.       TYPE OF RADIATION THERAPY ADMINISTERED:  4500 cGy in 5 treatments given from 6/27/2022 - 7/7/2022.     INTERVAL SINCE COMPLETION OF RADIATION THERAPY: 1 year and 9 months.      SUBJECTIVE:  Mr. Spencer is a 68 year old male who presents with an asymptomatic LEFT upper lobe nodule identified on lung cancer screening CT. patient had a few screening CT chest over the past few years.  A most recent CT chest on 2/19/2022 reviewed new irregular nodule within the left upper lobe measuring 12 x 10 x 5 mm and a second 16 x 7 x 3 mm abnormality in the right lower lobe.  This is worrisome for malignancy.  PET CT scan on 3/28/2022 showed FDG avid spiculated left upper lobe pleural-based mass suspicious for malignancy.  There is no FDG uptake in the right lower lobe lesion.  There is no radiographic evidence of lymph nodes and systemic metastasis.  Patient is not a good candidate for surgery given his current medical status. Patient received stereotactic radiosurgery for his lung cancer with a total dose of 4500 cGy in 5 treatments given from 6/27/2022 - 7/7/2022.  The patient tolerated ration therapy very well with minimal side effect.      The patient has been doing well since completion of the radiation therapy.  He denies any pain and discomfort at the time of evaluation.  He is here for routine post therapy office follow-up.     Medications were reviewed and are up to  date on EPIC.    The following portions of the patient's history were reviewed and updated as appropriate: allergies, current medications, past family history, past medical history, past social history, past surgical history and problem list.    Review of Systems:      General  Constitutional  Constitutional (WDL): Exceptions to WDL  Fatigue: Fatigue relieved by rest  EENT  Eye Disorders  Eye Disorder (WDL): Assessment not pertinent to visit  Ear Disorders  Ear Disorder (WDL): Assessment not pertinent to visit  Respiratory  Respiratory  Respiratory (WDL): All respiratory elements are within defined limits  Cardiovascular  Cardiovascular  Cardiovascular (WDL): All cardiovascular elements are within defined limits  Gastrointestinal  Gastrointestinal  Gastrointestinal (WDL): All gastrointestinal elements are within defined limits  Musculoskeletal  Musculoskeletal and Connective Tissue Disorders  Musculoskeletal & Connective (WDL): Exceptions to WDL  Arthralgia: Mild pain  Integumentary  Integumentary  Integumentary (WDL): All integumentary elements are within defined limits  Neurological  Neurosensory  Neurosensory (WDL): All neurosensory elements are within defined limits  Genitourinary/Reproductive  Genitourinary  Genitourinary (WDL): All genitourinary elements are within defined limits  Lymphatic  Lymph System Disorders  Lymph (WDL): All lymph elements are within defined limits  Pain  Pain Score: No Pain (0)  AUA Assessment                                                              Accompanied by  Accompanied By: significant other    Objective:      PHYSICAL EXAMINATION:    BP 91/70 (BP Location: Left arm, Patient Position: Sitting)   Pulse 80   Temp 98.1  F (36.7  C)   Resp 20   Wt 54.7 kg (120 lb 8 oz)   SpO2 97%   BMI 21.35 kg/m      Gen: Alert, in NAD  Eyes: PERRL, EOMI, sclera anicteric  Neck: Supple, full ROM, no LAD  Pulm: No wheezing, stridor or respiratory distress  CV: Well-perfused, no cyanosis,  no pedal edema  Back: No step-offs or pain to palpation along the thoracolumbar spine  Rectal: Deferred  : Deferred  Musculoskeletal: Normal muscle bulk and tone  Skin: Normal color and turgor  Neurologic: A/Ox3, CN II-XII intact, normal gait and station  Psychiatric: Appropriate mood and affect     Imaging: Imaging results 30 days: CT Chest w/o contrast    Result Date: 3/4/2024  EXAM: CT CHEST W/O CONTRAST LOCATION: [facility] DATE: 3/4/2024 INDICATION:  Malignant neoplasm of upper lobe of left lung (H) COMPARISON: CT 09/05/2023. TECHNIQUE: CT chest without IV contrast. Multiplanar reformats were obtained. Dose reduction techniques were used. CONTRAST: None. FINDINGS: LUNGS AND PLEURA: Moderate biapical scarring. Stable emphysema. Stable fiducial marker and posttreatment change in the anterior left upper lobe. Diffusely ectatic airways. No pleural effusion. Other smaller pulmonary nodules remain stable. MEDIASTINUM/AXILLAE: No lymphadenopathy. Normal heart size. Normal vasculature. The axilla are normal. CORONARY ARTERY CALCIFICATION: Mild. UPPER ABDOMEN: No significant finding. MUSCULOSKELETAL: Stable degenerative changes.     IMPRESSION: 1.  No interval change since 09/05/2023. No evidence for recurrent or metastatic disease.       Impression     The patient is a 68-year-old gentleman with a diagnosis of early stage lung cancer status post SBRT completed 1 year and  9 months ago.  The patient has been doing well with no evidence of recurrence.      Assessment & Plan:     1.  I have reviewed his restaging CT scan and compared to the previous radiation therapy field and CT scan.  There is no evidence of cancer recurrence.  He is scheduled return to radiation oncology in 6 months for office follow-up and CT chest.     2.  Continue follow-up with Dr. Kirk Azevedo, PCP as planned     Face to face time 30 minutes with > 80% spent on consultation, education and coordination of care.      Janessa  "MD Joaquín  Department of Radiation Oncology   [facility]  Tel: [phone]  Page: [phone]    [facility]  [address] [place] [address]     [facility]   [address] [facility] [place] [address]    CC:  Patient Care Team:  Zora Strong MD as PCP - General (Student in organized health care education/training program)  Janessa Yanes MD as MD (Radiation Oncology)  Janessa Yanes MD as Assigned Cancer Care Provider  Lex Joshi MD as Assigned Heart and Vascular Provider  Zora Strong MD as Assigned PCP      Oncology Rooming Note    March 7, 2024 10:43 AM   Zafar Spencer is a 68 year old male who presents for:    Chief Complaint   Patient presents with     Oncology Clinic Visit     Lung Cancer     Follow up     Initial Vitals: BP 91/70 (BP Location: Left arm, Patient Position: Sitting)   Pulse 80   Temp 98.1  F (36.7  C)   Resp 20   Wt 54.7 kg (120 lb 8 oz)   SpO2 97%   BMI 21.35 kg/m   Estimated body mass index is 21.35 kg/m  as calculated from the following:    Height as of 2/22/24: 1.6 m (5' 2.99\").    Weight as of this encounter: 54.7 kg (120 lb 8 oz). Body surface area is 1.56 meters squared.  No Pain (0) Comment: Data Unavailable   No LMP for male patient.  Allergies reviewed: Yes  Medications reviewed: Yes    Medications: Medication refills not needed today.  Pharmacy name entered into [facility]:    [id number] [place] [address] [facility] [address]    Frailty Screening:   Is the patient here for a new oncology consult visit in cancer care? 2. No      Clinical concerns: Follow up, CT done on 3/4  Dr. Yanes was notified.      Brittany Victor RN                Again, thank you for allowing me to participate in the care of your patient.        Sincerely,        Janessa Yanes MD  "

## 2024-03-07 NOTE — PROGRESS NOTES
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  The patient is not a good candidate for surgery given his poor medical status.       TYPE OF RADIATION THERAPY ADMINISTERED:  4500 cGy in 5 treatments given from 6/27/2022 - 7/7/2022.     INTERVAL SINCE COMPLETION OF RADIATION THERAPY: 1 year and 9 months.      SUBJECTIVE:  Mr. Spencer is a 68 year old male who presents with an asymptomatic LEFT upper lobe nodule identified on lung cancer screening CT. patient had a few screening CT chest over the past few years.  A most recent CT chest on 2/19/2022 reviewed new irregular nodule within the left upper lobe measuring 12 x 10 x 5 mm and a second 16 x 7 x 3 mm abnormality in the right lower lobe.  This is worrisome for malignancy.  PET CT scan on 3/28/2022 showed FDG avid spiculated left upper lobe pleural-based mass suspicious for malignancy.  There is no FDG uptake in the right lower lobe lesion.  There is no radiographic evidence of lymph nodes and systemic metastasis.  Patient is not a good candidate for surgery given his current medical status. Patient received stereotactic radiosurgery for his lung cancer with a total dose of 4500 cGy in 5 treatments given from 6/27/2022 - 7/7/2022.  The patient tolerated ration therapy very well with minimal side effect.      The patient has been doing well since completion of the radiation therapy.  He denies any pain and discomfort at the time of evaluation.  He is here for routine post therapy office follow-up.     Medications were reviewed and are up to date on EPIC.    The following portions of the patient's history were reviewed and updated as appropriate: allergies, current medications, past family history, past medical history, past social history, past surgical history and problem list.    Review of Systems:       General  Constitutional  Constitutional (WDL): Exceptions to WDL  Fatigue: Fatigue relieved by rest  EENT  Eye Disorders  Eye Disorder (WDL): Assessment not pertinent to visit  Ear Disorders  Ear Disorder (WDL): Assessment not pertinent to visit  Respiratory  Respiratory  Respiratory (WDL): All respiratory elements are within defined limits  Cardiovascular  Cardiovascular  Cardiovascular (WDL): All cardiovascular elements are within defined limits  Gastrointestinal  Gastrointestinal  Gastrointestinal (WDL): All gastrointestinal elements are within defined limits  Musculoskeletal  Musculoskeletal and Connective Tissue Disorders  Musculoskeletal & Connective (WDL): Exceptions to WDL  Arthralgia: Mild pain  Integumentary  Integumentary  Integumentary (WDL): All integumentary elements are within defined limits  Neurological  Neurosensory  Neurosensory (WDL): All neurosensory elements are within defined limits  Genitourinary/Reproductive  Genitourinary  Genitourinary (WDL): All genitourinary elements are within defined limits  Lymphatic  Lymph System Disorders  Lymph (WDL): All lymph elements are within defined limits  Pain  Pain Score: No Pain (0)  AUA Assessment                                                              Accompanied by  Accompanied By: significant other    Objective:      PHYSICAL EXAMINATION:    BP 91/70 (BP Location: Left arm, Patient Position: Sitting)   Pulse 80   Temp 98.1  F (36.7  C)   Resp 20   Wt 54.7 kg (120 lb 8 oz)   SpO2 97%   BMI 21.35 kg/m      Gen: Alert, in NAD  Eyes: PERRL, EOMI, sclera anicteric  Neck: Supple, full ROM, no LAD  Pulm: No wheezing, stridor or respiratory distress  CV: Well-perfused, no cyanosis, no pedal edema  Back: No step-offs or pain to palpation along the thoracolumbar spine  Rectal: Deferred  : Deferred  Musculoskeletal: Normal muscle bulk and tone  Skin: Normal color and turgor  Neurologic: A/Ox3, CN II-XII intact, normal gait and  station  Psychiatric: Appropriate mood and affect     Imaging: Imaging results 30 days: CT Chest w/o contrast    Result Date: 3/4/2024  EXAM: CT CHEST W/O CONTRAST LOCATION: [facility] DATE: 3/4/2024 INDICATION:  Malignant neoplasm of upper lobe of left lung (H) COMPARISON: CT 09/05/2023. TECHNIQUE: CT chest without IV contrast. Multiplanar reformats were obtained. Dose reduction techniques were used. CONTRAST: None. FINDINGS: LUNGS AND PLEURA: Moderate biapical scarring. Stable emphysema. Stable fiducial marker and posttreatment change in the anterior left upper lobe. Diffusely ectatic airways. No pleural effusion. Other smaller pulmonary nodules remain stable. MEDIASTINUM/AXILLAE: No lymphadenopathy. Normal heart size. Normal vasculature. The axilla are normal. CORONARY ARTERY CALCIFICATION: Mild. UPPER ABDOMEN: No significant finding. MUSCULOSKELETAL: Stable degenerative changes.     IMPRESSION: 1.  No interval change since 09/05/2023. No evidence for recurrent or metastatic disease.       Impression     The patient is a 68-year-old gentleman with a diagnosis of early stage lung cancer status post SBRT completed 1 year and  9 months ago.  The patient has been doing well with no evidence of recurrence.      Assessment & Plan:     1.  I have reviewed his restaging CT scan and compared to the previous radiation therapy field and CT scan.  There is no evidence of cancer recurrence.  He is scheduled return to radiation oncology in 6 months for office follow-up and CT chest.     2.  Continue follow-up with Dr. Kirk Azevedo, PCP as planned     Face to face time 30 minutes with > 80% spent on consultation, education and coordination of care.      Janessa Yanes MD  Department of Radiation Oncology   [facility]  Tel: [phone]  Page: [phone]    [facility]  [address]  [place] [address]     [facility]   [address] [facility] [place] [address]    CC:  Patient Care  Team:  Zora Strong MD as PCP - General (Student in organized health care education/training program)  Janessa Yanes MD as MD (Radiation Oncology)  Janessa Yanes MD as Assigned Cancer Care Provider  Lex Joshi MD as Assigned Heart and Vascular Provider  Zora Strong MD as Assigned PCP

## 2024-03-07 NOTE — PROGRESS NOTES
"Oncology Rooming Note    March 7, 2024 10:43 AM   Zafar Spencer is a 68 year old male who presents for:    Chief Complaint   Patient presents with    Oncology Clinic Visit    Lung Cancer     Follow up     Initial Vitals: BP 91/70 (BP Location: Left arm, Patient Position: Sitting)   Pulse 80   Temp 98.1  F (36.7  C)   Resp 20   Wt 54.7 kg (120 lb 8 oz)   SpO2 97%   BMI 21.35 kg/m   Estimated body mass index is 21.35 kg/m  as calculated from the following:    Height as of 2/22/24: 1.6 m (5' 2.99\").    Weight as of this encounter: 54.7 kg (120 lb 8 oz). Body surface area is 1.56 meters squared.  No Pain (0) Comment: Data Unavailable   No LMP for male patient.  Allergies reviewed: Yes  Medications reviewed: Yes    Medications: Medication refills not needed today.  Pharmacy name entered into [employer]:    [id number] [place] [address] [facility] [address]    Frailty Screening:   Is the patient here for a new oncology consult visit in cancer care? 2. No      Clinical concerns: Follow up, CT done on 3/4  Dr. Yanes was notified.      Brittany Victor RN              "

## 2024-03-22 ENCOUNTER — OFFICE VISIT (OUTPATIENT)
Dept: FAMILY MEDICINE | Facility: CLINIC | Age: 69
End: 2024-03-22
Payer: COMMERCIAL

## 2024-03-22 VITALS
DIASTOLIC BLOOD PRESSURE: 61 MMHG | TEMPERATURE: 98 F | HEIGHT: 63 IN | WEIGHT: 120 LBS | SYSTOLIC BLOOD PRESSURE: 96 MMHG | OXYGEN SATURATION: 96 % | HEART RATE: 71 BPM | BODY MASS INDEX: 21.26 KG/M2

## 2024-03-22 DIAGNOSIS — Z72.0 SMOKING TRYING TO QUIT: ICD-10-CM

## 2024-03-22 DIAGNOSIS — Z23 NEED FOR TDAP VACCINATION: ICD-10-CM

## 2024-03-22 DIAGNOSIS — F17.210 CIGARETTE NICOTINE DEPENDENCE WITHOUT COMPLICATION: Primary | ICD-10-CM

## 2024-03-22 DIAGNOSIS — E78.5 DYSLIPIDEMIA, GOAL LDL BELOW 70: ICD-10-CM

## 2024-03-22 PROCEDURE — 99214 OFFICE O/P EST MOD 30 MIN: CPT | Mod: GC

## 2024-03-22 RX ORDER — POLYETHYLENE GLYCOL 3350 17 G
2 POWDER IN PACKET (EA) ORAL
Qty: 90 LOZENGE | Refills: 0 | Status: SHIPPED | OUTPATIENT
Start: 2024-03-22

## 2024-03-22 RX ORDER — BUPROPION HYDROCHLORIDE 150 MG/1
150 TABLET, EXTENDED RELEASE ORAL 2 TIMES DAILY
Qty: 180 TABLET | Refills: 0 | Status: SHIPPED | OUTPATIENT
Start: 2024-03-22 | End: 2024-04-29

## 2024-03-22 RX ORDER — RESPIRATORY SYNCYTIAL VIRUS VACCINE 120MCG/0.5
0.5 KIT INTRAMUSCULAR ONCE
Qty: 1 EACH | Refills: 0 | Status: CANCELLED | OUTPATIENT
Start: 2024-03-22 | End: 2024-03-22

## 2024-03-22 RX ORDER — ATORVASTATIN CALCIUM 40 MG/1
40 TABLET, FILM COATED ORAL DAILY
Qty: 90 TABLET | Refills: 3 | Status: SHIPPED | OUTPATIENT
Start: 2024-03-22

## 2024-03-22 RX ORDER — NICOTINE 21 MG/24HR
1 PATCH, TRANSDERMAL 24 HOURS TRANSDERMAL EVERY 24 HOURS
Qty: 28 PATCH | Refills: 0 | Status: SHIPPED | OUTPATIENT
Start: 2024-03-22 | End: 2024-04-29

## 2024-03-22 NOTE — PROGRESS NOTES
Assessment & Plan     Cigarette nicotine dependence without complication  Smoking trying to quit  History of tobacco use disorder, marijuana use, and lung adenocarcinoma (in remission). Strongly recommend complete smoking cessation (tobacco and marijuana), particularly given lung cancer history.  At last visit, had planned to increase Wellbutrin to twice daily, but patient forgot to do this.  He is not using NRT.  Currently smoking 3-4 cigarettes daily and quite a bit of marijuana.  Will increase Wellbutrin and restart NRT.  Discussed replacement habits and [place] quit partner resources.  - buPROPion (WELLBUTRIN SR) 150 MG 12 hr tablet  Dispense: 180 tablet; Refill: 0  - nicotine (NICODERM CQ) 14 MG/24HR 24 hr patch  Dispense: 28 patch; Refill: 0  - nicotine (COMMIT) 2 MG lozenge  Dispense: 90 lozenge; Refill: 0  - [place] Quit Partner Referral  - SMOKING CESSATION COUNSELING 3-10 MIN    Dyslipidemia, goal LDL below 70  History of hyperlipidemia. Most recent lipid panel looked great. Needs statin refill.   - atorvastatin (LIPITOR) 40 MG tablet  Dispense: 90 tablet; Refill: 3    Need for Tdap vaccination  Recommended patient go to pharmacy for Tdap and RSV vaccines.       Return in about 6 weeks (around 5/3/2024) for smoking.    González Stephen is a 68 year old, presenting for the following health issues:  Trauma (Follow-up veins left leg)      3/22/2024    10:39 AM   Additional Questions   Roomed by Sarah'         3/22/2024    Information    services provided? No     HPI     PMH: lung adenocarcinoma (s/p radiation in 2022, now in remission), tobacco use disorder, hyperlipidemia     Recent oncology visit - cancer is still in remission!     Smoking 3-4 cigarettes a day.   Also smoking marijuana.   Does not use edible.     Did not increase the wellbutrin to BID after last visit which was the plan.   Just forgot.   Not using NRT but has worked well for him in the past. Would like to restart.  "  Discussed replacing smoking with other habits.   Recommended [place] [employer].                     Objective    BP 96/61   Pulse 71   Temp 98  F (36.7  C)   Ht 1.598 m (5' 2.91\")   Wt 54.4 kg (120 lb)   SpO2 96%   BMI 21.32 kg/m    Body mass index is 21.32 kg/m .  Physical Exam   GENERAL: alert and no distress  NECK: no adenopathy, no asymmetry, masses, or scars  RESP: lungs clear to auscultation - no rales, rhonchi or wheezes  CV: regular rate and rhythm, normal S1 S2, no S3 or S4, no murmur, click or rub, no peripheral edema  ABDOMEN: soft, nontender, no hepatosplenomegaly, no masses and bowel sounds normal  MS: no gross musculoskeletal defects noted, no edema    Wt Readings from Last 4 Encounters:   03/22/24 54.4 kg (120 lb)   03/07/24 54.7 kg (120 lb 8 oz)   02/22/24 54 kg (119 lb)   01/25/24 53.5 kg (118 lb)            Signed Electronically by: Zora Strong MD    "

## 2024-03-22 NOTE — PROGRESS NOTES
I have personally reviewed the history and examination as documented by Dr. Strong.  I was present during key portions of the visit and agree with the assessment and plan as documented for 68 yr old male with hx of lung CA here for follow-up. Smoking cessation plan reviewed. Weight is stable. Precautions given. Anticipatory guidance given.     Foster Burgess MD  March 22, 2024  11:08 AM

## 2024-04-25 ENCOUNTER — APPOINTMENT (OUTPATIENT)
Dept: CT IMAGING | Facility: HOSPITAL | Age: 69
End: 2024-04-25
Payer: COMMERCIAL

## 2024-04-25 ENCOUNTER — HOSPITAL ENCOUNTER (EMERGENCY)
Facility: HOSPITAL | Age: 69
Discharge: HOME | End: 2024-04-25
Attending: EMERGENCY MEDICINE | Admitting: EMERGENCY MEDICINE
Payer: COMMERCIAL

## 2024-04-25 VITALS
RESPIRATION RATE: 19 BRPM | WEIGHT: 117.1 LBS | BODY MASS INDEX: 18.82 KG/M2 | SYSTOLIC BLOOD PRESSURE: 122 MMHG | HEIGHT: 66 IN | TEMPERATURE: 97.7 F | OXYGEN SATURATION: 100 % | HEART RATE: 63 BPM | DIASTOLIC BLOOD PRESSURE: 73 MMHG

## 2024-04-25 DIAGNOSIS — R07.9 CHEST PAIN, UNSPECIFIED TYPE: ICD-10-CM

## 2024-04-25 LAB
ANION GAP SERPL CALCULATED.3IONS-SCNC: 10 MMOL/L (ref 7–15)
BASOPHILS # BLD AUTO: 0.1 10E3/UL (ref 0–0.2)
BASOPHILS NFR BLD AUTO: 1 %
BUN SERPL-MCNC: 13.8 MG/DL (ref 8–23)
CALCIUM SERPL-MCNC: 9.5 MG/DL (ref 8.8–10.2)
CHLORIDE SERPL-SCNC: 101 MMOL/L (ref 98–107)
CREAT SERPL-MCNC: 1.05 MG/DL (ref 0.67–1.17)
DEPRECATED HCO3 PLAS-SCNC: 25 MMOL/L (ref 22–29)
EGFRCR SERPLBLD CKD-EPI 2021: 77 ML/MIN/1.73M2
EOSINOPHIL # BLD AUTO: 0.2 10E3/UL (ref 0–0.7)
EOSINOPHIL NFR BLD AUTO: 3 %
ERYTHROCYTE [DISTWIDTH] IN BLOOD BY AUTOMATED COUNT: 12.8 % (ref 10–15)
GLUCOSE SERPL-MCNC: 109 MG/DL (ref 70–99)
HCT VFR BLD AUTO: 40.5 % (ref 40–53)
HGB BLD-MCNC: 13.5 G/DL (ref 13.3–17.7)
IMM GRANULOCYTES # BLD: 0 10E3/UL
IMM GRANULOCYTES NFR BLD: 0 %
LYMPHOCYTES # BLD AUTO: 2.1 10E3/UL (ref 0.8–5.3)
LYMPHOCYTES NFR BLD AUTO: 27 %
MAGNESIUM SERPL-MCNC: 2 MG/DL (ref 1.7–2.3)
MCH RBC QN AUTO: 31.3 PG (ref 26.5–33)
MCHC RBC AUTO-ENTMCNC: 33.3 G/DL (ref 31.5–36.5)
MCV RBC AUTO: 94 FL (ref 78–100)
MONOCYTES # BLD AUTO: 0.7 10E3/UL (ref 0–1.3)
MONOCYTES NFR BLD AUTO: 9 %
NEUTROPHILS # BLD AUTO: 4.8 10E3/UL (ref 1.6–8.3)
NEUTROPHILS NFR BLD AUTO: 60 %
NRBC # BLD AUTO: 0 10E3/UL
NRBC BLD AUTO-RTO: 0 /100
PLATELET # BLD AUTO: 306 10E3/UL (ref 150–450)
POTASSIUM SERPL-SCNC: 4.2 MMOL/L (ref 3.4–5.3)
RBC # BLD AUTO: 4.32 10E6/UL (ref 4.4–5.9)
SODIUM SERPL-SCNC: 136 MMOL/L (ref 135–145)
TROPONIN T SERPL HS-MCNC: 6 NG/L
WBC # BLD AUTO: 7.9 10E3/UL (ref 4–11)

## 2024-04-25 PROCEDURE — 84484 ASSAY OF TROPONIN QUANT: CPT

## 2024-04-25 PROCEDURE — 83735 ASSAY OF MAGNESIUM: CPT

## 2024-04-25 PROCEDURE — 99285 EMERGENCY DEPT VISIT HI MDM: CPT | Mod: 25

## 2024-04-25 PROCEDURE — 250N000011 HC RX IP 250 OP 636: Performed by: EMERGENCY MEDICINE

## 2024-04-25 PROCEDURE — 82374 ASSAY BLOOD CARBON DIOXIDE: CPT

## 2024-04-25 PROCEDURE — 85041 AUTOMATED RBC COUNT: CPT

## 2024-04-25 PROCEDURE — 93005 ELECTROCARDIOGRAM TRACING: CPT | Performed by: EMERGENCY MEDICINE

## 2024-04-25 PROCEDURE — 36415 COLL VENOUS BLD VENIPUNCTURE: CPT

## 2024-04-25 PROCEDURE — 250N000013 HC RX MED GY IP 250 OP 250 PS 637

## 2024-04-25 PROCEDURE — 71275 CT ANGIOGRAPHY CHEST: CPT

## 2024-04-25 RX ORDER — ACETAMINOPHEN 325 MG/1
325 TABLET ORAL ONCE
Status: COMPLETED | OUTPATIENT
Start: 2024-04-25 | End: 2024-04-25

## 2024-04-25 RX ORDER — IOPAMIDOL 755 MG/ML
90 INJECTION, SOLUTION INTRAVASCULAR ONCE
Status: COMPLETED | OUTPATIENT
Start: 2024-04-25 | End: 2024-04-25

## 2024-04-25 RX ADMIN — IOPAMIDOL 90 ML: 755 INJECTION, SOLUTION INTRAVENOUS at 13:20

## 2024-04-25 RX ADMIN — ACETAMINOPHEN 325 MG: 325 TABLET ORAL at 13:54

## 2024-04-25 ASSESSMENT — COLUMBIA-SUICIDE SEVERITY RATING SCALE - C-SSRS
2. HAVE YOU ACTUALLY HAD ANY THOUGHTS OF KILLING YOURSELF IN THE PAST MONTH?: NO
1. IN THE PAST MONTH, HAVE YOU WISHED YOU WERE DEAD OR WISHED YOU COULD GO TO SLEEP AND NOT WAKE UP?: NO
6. HAVE YOU EVER DONE ANYTHING, STARTED TO DO ANYTHING, OR PREPARED TO DO ANYTHING TO END YOUR LIFE?: NO

## 2024-04-25 ASSESSMENT — ACTIVITIES OF DAILY LIVING (ADL)
ADLS_ACUITY_SCORE: 35

## 2024-04-25 NOTE — ED TRIAGE NOTES
Patient presents to ER with spouse for c/o left sided chest pain that started last night after smoking marijuana around 2100 and woke up with it still this morning.  Has not taken anything for the pain.  Denies back pain, numbness/tingling, dizziness, shortness of breath, headache.

## 2024-04-25 NOTE — DISCHARGE INSTRUCTIONS
Rest.  Orally hydrate.  Return to the ED if new symptoms develop or symptoms worsen.  Follow-up with your primary care doctor discuss your ED visit as soon as possible.

## 2024-04-25 NOTE — ED PROVIDER NOTES
"Emergency Department Midlevel Supervisory Note     I had a face to face encounter with this patient seen by the Advanced Practice Provider (SHAQUILLE). I personally made/approved the management plan and take responsibility for the patient management. I personally saw patient and performed a substantive portion of the visit including all aspects of the medical decision making.     ED Course:  11:49 AM Ofelia Hooker PA-C staffed patient with me. I agree with their assessment and plan of management, and I will see the patient.  11:57 AM I met with the patient to introduce myself, gather additional history, perform my initial exam, and discuss the plan.   1 PM.  Troponin, CBC and basic metabolic unremarkable.  Magnesium normal.  CTA pending.  1:41 PM.  CTA without evidence of acute pulmonary embolism, infiltrate, pneumothorax.  Patient with centrilobular emphysema.  Could have had a small bleb which ruptured given him his symptoms.  2:06 PM.  Patient improved after Tylenol.  Patient's wife has arrived.  Will discharge the patient with his wife.  Patient with atypical chest pain without evidence of overt significant pathology.  Routine return cautions given  Brief HPI:     Zafar Spencer is a 68 year old male who presents for evaluation of left-sided chest pain starting last night after smoking marijuana around 2100. Pain was still present upon waking up this morning. It has been constant. No back pain, numbness/tingling, dizziness, shortness of breath, or headache.     I, Natasha Carbajal, am serving as a scribe to document services personally performed by Dr. Moran based on my observation and the provider's statements to me. I, Jose L Moran MD, attest that Natasha Carbajal is acting in a scribe capacity, has observed my performance of the services and has documented them in accordance with my direction.     Brief Physical Exam: /71   Pulse 66   Temp 97.7  F (36.5  C) (Oral)   Resp 18   Ht 1.676 m (5' 6\")   Wt 53.1 kg " "(117 lb 1.6 oz)   SpO2 97%   BMI 18.90 kg/m    Constitutional:  Alert,  frail appearing male who looks much older than stated age   EYES: Conjunctivae clear  HENT:  Atraumatic  Respiratory:  Respirations even, unlabored, in mild acute respiratory distress, diminished breath sounds bilaterally  Cardiovascular:  Regular rate and rhythm, good peripheral perfusion  GI: Soft, non-distended, non-tender  Musculoskeletal:  Moves all 4 extremities equally, grossly symmetrical strength  Integument: Warm & dry. No appreciable rash, erythema.  Neurologic:  Alert & oriented, speech clear and fluent, no focal deficits noted  Psych: Normal mood and affect       MDM:  Patient with sudden chest pain after smoking marijuana last night.  Typically smokes marijuana routinely.  Does make reference to \"a hole in my lung\".  He believes it might of gotten larger.  Presumptively patient is referring to blebs within the lungs.  Review of records also indicate patient seen on 3/22/2024 with neoplasm noted to the left upper lobe.  Will obtain CTA of the chest to assess for pulmonary embolism versus complication of his known neoplasm.  Less likely to represent infectious process given sudden onset of symptoms.  Baseline blood work also being obtained.       Acute chest pain      Labs and Imaging:     Results for orders placed or performed during the hospital encounter of 04/25/24   CTA Chest with Contrast     Status: None    Narrative    EXAM: CTA CHEST WITH CONTRAST  LOCATION: [facility]  DATE: 4/25/2024    INDICATION: chest pain and shortness of breath  COMPARISON: 3/4/2024  TECHNIQUE: CT chest pulmonary angiogram during arterial phase injection of IV contrast. Multiplanar reformats and MIP reconstructions were performed. Dose reduction techniques were used.   CONTRAST: isovue 370 90ml    FINDINGS:  ANGIOGRAM CHEST: Pulmonary arteries are normal caliber and negative for pulmonary emboli. Thoracic aorta is negative " for dissection. No CT evidence of right heart strain.    LUNGS AND PLEURA: No new or enlarging suspicious nodules. Unchanged posttreatment changes associated with fiducial marker in the left upper lobe. Upper lung predominant centrilobular and paraseptal emphysema with biapical scarring.    MEDIASTINUM/AXILLAE: Normal    CORONARY ARTERY CALCIFICATION: Mild.    UPPER ABDOMEN: No significant finding.    MUSCULOSKELETAL: Normal      Impression    IMPRESSION:  No pulmonary emboli or other acute findings in the chest.     Basic metabolic panel     Status: Abnormal   Result Value Ref Range    Sodium 136 135 - 145 mmol/L    Potassium 4.2 3.4 - 5.3 mmol/L    Chloride 101 98 - 107 mmol/L    Carbon Dioxide (CO2) 25 22 - 29 mmol/L    Anion Gap 10 7 - 15 mmol/L    Urea Nitrogen 13.8 8.0 - 23.0 mg/dL    Creatinine 1.05 0.67 - 1.17 mg/dL    GFR Estimate 77 >60 mL/min/1.73m2    Calcium 9.5 8.8 - 10.2 mg/dL    Glucose 109 (H) 70 - 99 mg/dL   Troponin T, High Sensitivity (now)     Status: Normal   Result Value Ref Range    Troponin T, High Sensitivity 6 <=22 ng/L   Magnesium     Status: Normal   Result Value Ref Range    Magnesium 2.0 1.7 - 2.3 mg/dL   CBC with platelets and differential     Status: Abnormal   Result Value Ref Range    WBC Count 7.9 4.0 - 11.0 10e3/uL    RBC Count 4.32 (L) 4.40 - 5.90 10e6/uL    Hemoglobin 13.5 13.3 - 17.7 g/dL    Hematocrit 40.5 40.0 - 53.0 %    MCV 94 78 - 100 fL    MCH 31.3 26.5 - 33.0 pg    MCHC 33.3 31.5 - 36.5 g/dL    RDW 12.8 10.0 - 15.0 %    Platelet Count 306 150 - 450 10e3/uL    % Neutrophils 60 %    % Lymphocytes 27 %    % Monocytes 9 %    % Eosinophils 3 %    % Basophils 1 %    % Immature Granulocytes 0 %    NRBCs per 100 WBC 0 <1 /100    Absolute Neutrophils 4.8 1.6 - 8.3 10e3/uL    Absolute Lymphocytes 2.1 0.8 - 5.3 10e3/uL    Absolute Monocytes 0.7 0.0 - 1.3 10e3/uL    Absolute Eosinophils 0.2 0.0 - 0.7 10e3/uL    Absolute Basophils 0.1 0.0 - 0.2 10e3/uL    Absolute Immature  Granulocytes 0.0 <=0.4 10e3/uL    Absolute NRBCs 0.0 10e3/uL   CBC with platelets + differential     Status: Abnormal    Narrative    The following orders were created for panel order CBC with platelets + differential.  Procedure                               Abnormality         Status                     ---------                               -----------         ------                     CBC with platelets and d...[[id number]]  Abnormal            Final result                 Please view results for these tests on the individual orders.        I have reviewed the relevant laboratory studies above.    I independently interpreted the following imaging study(s):   CT chest    EKG:    Sinus bradycardia.  Rate of 56.  Normal QRS.  Normal ST segments.  Normal EKG other than sinus bradycardia.  Unchanged compared to June 25, 2018   I reviewed and independently interpreted the patient's EKG, with comments made as listed below. Please see scanned EKG for full report.       Procedures:  I was present for the key portions of procedures documented in SHAQUILLE/midlevel note, see midlevel note for further details.    Jose L Moran MD  [facility] EMERGENCY DEPARTMENT  [address] [place] [id number] [phone]     Jose L Moran MD  04/25/24 [id number]

## 2024-04-25 NOTE — ED PROVIDER NOTES
EMERGENCY DEPARTMENT ENCOUNTER      NAME: Zafar Spencer  AGE: 68 year old male  YOB: 1955  MRN: [id number]  EVALUATION DATE & TIME: 4/25/2024 11:42 AM    PCP: Zora Strong    ED PROVIDER: Ofelia Hooker PA-C      Chief Complaint   Patient presents with    Chest Pain     FINAL IMPRESSION:  1. Chest pain, unspecified type        ED COURSE & MEDICAL DECISION MAKING:    Pertinent Labs & Imaging studies reviewed. (See chart for details)  68 year old male presents to the Emergency Department for evaluation of chest pain.  Last night after smoking marijuana patient had centralized chest pain that does not radiate to his back, jaw or shoulder.  He denies any shortness of breath.  Reports that he has a history of lung nodules.  Denies all other symptoms.  Vital signs reviewed and unremarkable.  Afebrile.  On exam cardiac and pulm exams unremarkable.  Breath sounds are equal bilaterally.  No respiratory distress.  Chest wall is nontender to palpation.  No overlying erythema, edema, ecchymosis.  No lacerations or abrasions.  No warmth.  Moves all 4 extremities without difficulty.    Differential diagnosis includes ACS, pericarditis, myocarditis, pneumonia, pneumothorax, hemothorax, PE, COPD exacerbation, asthma exacerbation.  EKG shows sinus rhythm with early repolarization.  Troponin is 6.  Attending did not feel a delta troponin was indicated at this time.  ACS unlikely. Mg is reassuring.  CBC shows no white blood cell elevation.  Hemoglobin is stable.  Basic is reassuring.  CTA of the chest shows no PE or acute findings in the chest. Given tylenol for symptoms which resolved his symptoms. Patient is educated on his results.  Patient will return to the ED if new symptoms develop or symptoms worsen.  Patient will follow-up with his primary care doctor discuss the ED visit.  All questions answered.  Patient agrees with plan.      ED COURSE:   12:22 PM I met with the patient, obtained history, performed  an initial exam, and discussed options and plan for diagnostics and treatment here in the ED. Staffed with Dr. Moran.   1:44 PM Patient will be discharged home. All questions answered. Patient agrees with the plan.     At the conclusion of the encounter I discussed the results of all of the tests and the disposition. The questions were answered. The patient or family acknowledged understanding and was agreeable with the care plan.     0 minutes of critical care time       Medical Decision Making  Obtained supplemental history:Supplemental history obtained?: No  Reviewed external records: External records reviewed?: Outpatient Record: Family [facility] visit on 03/22/24 for cigarette nicotine dependence  Care impacted by chronic illness:Smoking / Nicotine Use  Care significantly affected by social determinants of health:Alcohol Abuse and/or Recreational Drug Use  Did you consider but not order tests?: Work up considered but not performed and documented in chart, if applicable  Did you interpret images independently?: Independent interpretation of ECG and images noted in documentation, when applicable.  Consultation discussion with other provider:Did you involve another provider (consultant, MH, pharmacy, etc.)?: I discussed the care with another health care provider, see documentation for details.  Discharge. No recommendations on prescription strength medication(s). See documentation for any additional details.      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    Patient states he was smoking marijuana last night, which he does regularly, and began having chest pain. Denies any radiation of the pain into his back, neck, or shoulders. The chest pain has been constant since yesterday evening. Family feel chest pain is due to not eating. Denies any other symptoms including fever, chills, dizziness, lightheadedness, or shortness of breath and all other symptoms.       REVIEW OF SYSTEMS   As per HPI    PAST MEDICAL HISTORY:  Past Medical History:   Diagnosis Date    Bronchitis     Closed fracture of distal end of right fibula with routine healing, unspecified fracture morphology, subsequent encounter 04/21/2021    -Injury on 12/24/20  -Seen on 1/5/21 -> continue CAM boot, repeat XR in 4 weeks  -Seen on 1/18/21 -> needed unemployment paperwork filled out  -[facility] 2/1/21 -> XR showed healing well -> wean off CAM boot, PT, repeat XR in 6 weeks  -Seen on 2/16/21 -> out of CAM boot, start PT  -Seen on 3/30/21 -> graduated from PT -> ordered XR to confirmed healed  -Seen on 4/15/21 -> XR reviewed (incomplet    Pulmonary nodules     Tobacco dependence        PAST SURGICAL HISTORY:  Past Surgical History:   Procedure Laterality Date    COLONOSCOPY N/A 8/16/2018    Procedure: COLONOSCOPY;  Surgeon: Alex Roche III, MD;  Location: [facility];  Service:     OTHER SURGICAL HISTORY      double hernia    OTHER SURGICAL HISTORY      hand (right)           CURRENT MEDICATIONS:    aspirin 81 MG EC tablet  atorvastatin (LIPITOR) 40 MG tablet  buPROPion (WELLBUTRIN SR) 150 MG 12 hr tablet  nicotine (COMMIT) 2 MG lozenge  nicotine (NICODERM CQ) 14 MG/24HR 24 hr patch  triamcinolone (KENALOG) 0.1 % external cream        ALLERGIES:  Allergies   Allergen Reactions    Latex Rash    Penicillins Rash     rash       FAMILY  "HISTORY:  Family History   Problem Relation Age of Onset    Diabetes Mother     Heart Disease Mother     Hypertension No family hx of     Coronary Artery Disease No family hx of     Cancer No family hx of        SOCIAL HISTORY:   Social History     Socioeconomic History    Marital status: Single   Occupational History    Occupation: [employer]   Tobacco Use    Smoking status: Every Day     Current packs/day: 1.00     Average packs/day: 1 pack/day for 52.0 years (52.0 ttl pk-yrs)     Types: Cigarettes    Smokeless tobacco: Never    Tobacco comments:     .25 pack currently   Vaping Use    Vaping status: Never Used   Substance and Sexual Activity    Alcohol use: No    Drug use: Yes     Frequency: 2.0 times per week     Types: Marijuana       VITALS:  BP (!) 146/72   Pulse 66   Temp 97.7  F (36.5  C) (Oral)   Resp 18   Ht 1.676 m (5' 6\")   Wt 53.1 kg (117 lb 1.6 oz)   SpO2 95%   BMI 18.90 kg/m      PHYSICAL EXAM    Physical Exam  Vitals and nursing note reviewed.   Constitutional:       Appearance: Normal appearance.   HENT:      Head: Atraumatic.      Right Ear: External ear normal.      Left Ear: External ear normal.      Nose: Nose normal.      Mouth/Throat:      Mouth: Mucous membranes are moist.   Eyes:      Conjunctiva/sclera: Conjunctivae normal.      Pupils: Pupils are equal, round, and reactive to light.   Cardiovascular:      Rate and Rhythm: Normal rate and regular rhythm.      Pulses: Normal pulses.      Heart sounds: Normal heart sounds. No murmur heard.     No friction rub. No gallop.   Pulmonary:      Effort: Pulmonary effort is normal. No tachypnea, accessory muscle usage or respiratory distress.      Breath sounds: Normal breath sounds. No stridor. No decreased breath sounds, wheezing, rhonchi or rales.   Chest:      Chest wall: No mass, deformity, tenderness, crepitus or edema. There is no dullness to percussion.   Abdominal:      Tenderness: There is no abdominal tenderness. There is no guarding " or rebound.   Musculoskeletal:         General: Normal range of motion.      Cervical back: Normal range of motion.   Skin:     General: Skin is dry.   Neurological:      Mental Status: He is alert. Mental status is at baseline.   Psychiatric:         Mood and Affect: Mood normal.         Thought Content: Thought content normal.          LAB:  All pertinent labs reviewed and interpreted.  Labs Ordered and Resulted from Time of ED Arrival to Time of ED Departure   BASIC METABOLIC PANEL - Abnormal       Result Value    Sodium 136      Potassium 4.2      Chloride 101      Carbon Dioxide (CO2) 25      Anion Gap 10      Urea Nitrogen 13.8      Creatinine 1.05      GFR Estimate 77      Calcium 9.5      Glucose 109 (*)    CBC WITH PLATELETS AND DIFFERENTIAL - Abnormal    WBC Count 7.9      RBC Count 4.32 (*)     Hemoglobin 13.5      Hematocrit 40.5      MCV 94      MCH 31.3      MCHC 33.3      RDW 12.8      Platelet Count 306      % Neutrophils 60      % Lymphocytes 27      % Monocytes 9      % Eosinophils 3      % Basophils 1      % Immature Granulocytes 0      NRBCs per 100 WBC 0      Absolute Neutrophils 4.8      Absolute Lymphocytes 2.1      Absolute Monocytes 0.7      Absolute Eosinophils 0.2      Absolute Basophils 0.1      Absolute Immature Granulocytes 0.0      Absolute NRBCs 0.0     TROPONIN T, HIGH SENSITIVITY - Normal    Troponin T, High Sensitivity 6     MAGNESIUM - Normal    Magnesium 2.0          RADIOLOGY:  Reviewed all pertinent imaging. Please see official radiology report.  CTA Chest with Contrast   Final Result   IMPRESSION:   No pulmonary emboli or other acute findings in the chest.             EKG:    Performed at: 11:35 on 04/25/24  Impression: Sinus bradycardia  Rate: 56 bpm  Rhythm: sinus bradycardia  Axis: 63  WA Interval: 152 ms  QRS Interval: 80 ms  QTc Interval: 386 ms  ST Changes: None  Comparison: 6/25/18, so significant changes found.  Dr. Moran have independently reviewed and interpreted  the EKG(s) documented above.    I, Lauren Centeno, am serving as a scribe to document services personally performed by Ofelia Hooker PA-C, based on my observation and the provider's statements to me. I, Ofelia Hooker PA-C, attest that Lauren Centeno is acting in a scribe capacity, has observed my performance of the services and has documented them in accordance with my direction.    Ofelia Hooker PA-C  [facility] EMERGENCY DEPARTMENT  [address] [place] [id number]  [phone]      Ofelia Hooker PA-C  04/25/24 3894

## 2024-04-26 ENCOUNTER — OFFICE VISIT (OUTPATIENT)
Dept: FAMILY MEDICINE | Facility: CLINIC | Age: 69
End: 2024-04-26
Payer: COMMERCIAL

## 2024-04-26 ENCOUNTER — PATIENT OUTREACH (OUTPATIENT)
Dept: CARE COORDINATION | Facility: CLINIC | Age: 69
End: 2024-04-26
Payer: COMMERCIAL

## 2024-04-26 VITALS
DIASTOLIC BLOOD PRESSURE: 63 MMHG | SYSTOLIC BLOOD PRESSURE: 100 MMHG | TEMPERATURE: 98.1 F | WEIGHT: 116 LBS | HEIGHT: 64 IN | HEART RATE: 77 BPM | OXYGEN SATURATION: 97 % | BODY MASS INDEX: 19.81 KG/M2

## 2024-04-26 DIAGNOSIS — M94.0 COSTOCHONDRITIS: Primary | ICD-10-CM

## 2024-04-26 DIAGNOSIS — Z23 NEED FOR TDAP VACCINATION: ICD-10-CM

## 2024-04-26 DIAGNOSIS — Z72.0 SMOKING TRYING TO QUIT: ICD-10-CM

## 2024-04-26 PROCEDURE — 99213 OFFICE O/P EST LOW 20 MIN: CPT | Mod: GC

## 2024-04-26 RX ORDER — IBUPROFEN 600 MG/1
600 TABLET, FILM COATED ORAL EVERY 6 HOURS PRN
Qty: 90 TABLET | Refills: 0 | Status: SHIPPED | OUTPATIENT
Start: 2024-04-26 | End: 2024-05-16

## 2024-04-26 RX ORDER — RESPIRATORY SYNCYTIAL VIRUS VACCINE 120MCG/0.5
0.5 KIT INTRAMUSCULAR ONCE
Qty: 1 EACH | Refills: 0 | Status: CANCELLED | OUTPATIENT
Start: 2024-04-26 | End: 2024-04-26

## 2024-04-26 NOTE — PROGRESS NOTES
"  Assessment & Plan     (M94.0) Costochondritis  (primary encounter diagnosis)  Comment: positional left sided chest pain worse with lying down on the left side, improved with walking. Seen in the ED yesterday and reports improvement, work up in ED largely unremarkable. Will start NSAIDs and Voltaren gel. Return precautions given.  Plan: diclofenac (VOLTAREN) 1 % topical gel,         ibuprofen (ADVIL/MOTRIN) 600 MG tablet            (Z23) Need for Tdap vaccination  Comment: received Tdap today  Plan: Tdap, tetanus-diptheria-acell pertussis,         (BOOSTRIX) 5-2.5-18.5 LF-MCG/0.5 BOYD injection            (Z72.0) Smoking trying to quit  Comment: still smoking 3-4 cigarettes but trying to reduce. Hasn't decided to quit yet.   Plan: nicotine (NICODERM CQ) 7 MG/24HR 24 hr patch          No follow-ups on file.    González Stephen is a 68 year old, presenting for the following health issues:  Chest Pain        3/22/2024    10:39 AM   Additional Questions   Roomed by Sarah'     HPI   69 yo male with hx of smoking, marijuana use and lung cancer in remission. Here for ED follow up. Was seen  there for chest pain one day ago.    Chest pain  - central and left side, non radiating  - no sob or fever, no increased cough  - positional, worse when sleeping on the left side  - improves with walking   - pain is improved since yesterday 4/10  - pain is worse  when lying on the left side, positional   - walking improved it   - work up in the ED unremarkable, normal troponin and reassuring EKG        Review of Systems  Constitutional, HEENT, cardiovascular, pulmonary, gi and gu systems are negative, except as otherwise noted.      Objective    /63 (BP Location: Right arm, Patient Position: Sitting, Cuff Size: Adult Regular)   Pulse 77   Temp 98.1  F (36.7  C)   Ht 1.625 m (5' 3.98\")   Wt 52.6 kg (116 lb)   SpO2 97%   BMI 19.93 kg/m    Body mass index is 19.93 kg/m .  Physical Exam   GENERAL: alert and no " distress  RESP: lungs clear to auscultation - no wheezing  CV: tender to palpation over the left chest wall, normal S1 S2, no murmur.  ABDOMEN: soft, nontender, no masses and bowel sounds normal  MS: no gross musculoskeletal defects noted, no edema            Signed Electronically by: WATSON Kelly PGY2

## 2024-04-26 NOTE — PROGRESS NOTES
Clinic Care Coordination Contact  Follow Up Progress Note      Assessment: The pt was recently in the ED, I called to check up on the pt, and help the pt setup a ED follow up. The pt was at [facility] for chest pain. I called the pt, but got his vm, so I left a vm for the pt to give me a call back. The pt has a follow up on 04/29/2024 at 1:40pm with .    Care Gaps:    Health Maintenance Due   Topic Date Due    COPD ACTION PLAN  Never done    ZOSTER IMMUNIZATION (1 of 2) Never done    RSV VACCINE (Pregnancy & 60+) (1 - 1-dose 60+ series) Never done    DTAP/TDAP/TD IMMUNIZATION (3 - Td or Tdap) 10/19/2022    COVID-19 Vaccine (4 - 2023-24 season) 01/25/2024           Care Plans      Intervention/Education provided during outreach:               Plan:     Care Coordinator will follow up in

## 2024-04-28 LAB
ATRIAL RATE - MUSE: 56 BPM
DIASTOLIC BLOOD PRESSURE - MUSE: NORMAL MMHG
INTERPRETATION ECG - MUSE: NORMAL
P AXIS - MUSE: 67 DEGREES
PR INTERVAL - MUSE: 152 MS
QRS DURATION - MUSE: 80 MS
QT - MUSE: 400 MS
QTC - MUSE: 386 MS
R AXIS - MUSE: 63 DEGREES
SYSTOLIC BLOOD PRESSURE - MUSE: NORMAL MMHG
T AXIS - MUSE: 76 DEGREES
VENTRICULAR RATE- MUSE: 56 BPM

## 2024-04-29 ENCOUNTER — OFFICE VISIT (OUTPATIENT)
Dept: FAMILY MEDICINE | Facility: CLINIC | Age: 69
End: 2024-04-29
Payer: COMMERCIAL

## 2024-04-29 VITALS
HEART RATE: 65 BPM | SYSTOLIC BLOOD PRESSURE: 106 MMHG | RESPIRATION RATE: 20 BRPM | HEIGHT: 64 IN | DIASTOLIC BLOOD PRESSURE: 68 MMHG | OXYGEN SATURATION: 97 % | TEMPERATURE: 98.1 F | BODY MASS INDEX: 19.63 KG/M2 | WEIGHT: 115 LBS

## 2024-04-29 DIAGNOSIS — Z72.0 SMOKING TRYING TO QUIT: ICD-10-CM

## 2024-04-29 DIAGNOSIS — F17.210 CIGARETTE NICOTINE DEPENDENCE WITHOUT COMPLICATION: ICD-10-CM

## 2024-04-29 DIAGNOSIS — R07.9 CHEST PAIN, UNSPECIFIED TYPE: Primary | ICD-10-CM

## 2024-04-29 PROCEDURE — 99214 OFFICE O/P EST MOD 30 MIN: CPT | Mod: GC

## 2024-04-29 RX ORDER — RESPIRATORY SYNCYTIAL VIRUS VACCINE 120MCG/0.5
0.5 KIT INTRAMUSCULAR ONCE
Qty: 1 EACH | Refills: 0 | Status: CANCELLED | OUTPATIENT
Start: 2024-04-29 | End: 2024-04-29

## 2024-04-29 RX ORDER — BUPROPION HYDROCHLORIDE 150 MG/1
150 TABLET, EXTENDED RELEASE ORAL 2 TIMES DAILY
Qty: 180 TABLET | Refills: 0 | Status: SHIPPED | OUTPATIENT
Start: 2024-04-29 | End: 2024-07-29

## 2024-04-29 NOTE — PROGRESS NOTES
Assessment & Plan     Chest pain, unspecified type  Recent ED visit 4/25 for left-sided chest pain.  Workup in ED unremarkable, so symptoms were attributed to costochondritis.  Over the past week, patient has continued to have episodes of left-sided chest pain associated with diaphoresis.  The episodes seem to be triggered by smoking marijuana or cigarettes.  He has not noticed exertion to be a trigger, but he is quite sedentary.  The episodes resolved spontaneously after a couple minutes.  ASCVD risk 11.9% -high risk given smoker.  Denies chest pain today.  Will order nuclear stress test as I do not think patient would be able to perform well on a treadmill.  - NM Lexiscan stress test; Future    Cigarette nicotine dependence without complication  Smoking trying to quit  Continues to work on smoking cessation.  On Wellbutrin, which has been helping; needs refill.  Patient reports he quit smoking cigarettes completely in the past week due to fear about the chest pain he has been experiencing as above.  - buPROPion (WELLBUTRIN SR) 150 MG 12 hr tablet; Take 1 tablet (150 mg) by mouth 2 times daily    MED REC REQUIRED  Post Medication Reconciliation Status:  Discharge medications reconciled and changed, see notes/orders    No follow-ups on file.    González Stephen is a 68 year old, presenting for the following health issues:  RECHECK (Check pain) and Hospital F/U (Seen at [facility])        4/29/2024     1:39 PM   Additional Questions   Roomed by Linda Rousseau   Accompanied by Myesha ROD     PMH: lung adenocarcinoma (s/p treatment in 2022, now in remission), tobacco use disorder, hyperlipidemia   Recent ED visit for chest pain, presumed costochondritis.   Seen by Dr Awad for ED follow-up on 4/26. No changes.     Still having chest pain.   Triggered by smoking marijuana or cigarettes.   Not exertional.  However, patient does not do much physical activity.  Not associated with shortness of breath.   Does get  "diaphoretic with the pain.   Pain to clear resolves after couple minutes.    Last time he had the pain was about 2 days ago. Again triggered by smoking marijuana.     Stopped smoking cigarettes completely in the past week because the chest pain was scaring him.     The 10-year ASCVD risk score (Mary FERMIN, et al., 2019) is: 11.9%    Values used to calculate the score:      Age: 68 years      Sex: Male      Is Non- : No      Diabetic: No      Tobacco smoker: Yes      Systolic Blood Pressure: 106 mmHg      Is BP treated: No      HDL Cholesterol: 47 mg/dL      Total Cholesterol: 130 mg/dL         Objective    /68 (BP Location: Right arm, Patient Position: Sitting, Cuff Size: Adult Regular)   Pulse 65   Temp 98.1  F (36.7  C)   Resp 20   Ht 1.62 m (5' 3.78\")   Wt 52.2 kg (115 lb)   SpO2 97%   BMI 19.88 kg/m    Body mass index is 19.88 kg/m .  Physical Exam   GENERAL: alert and no distress  NECK: no adenopathy, no asymmetry, masses, or scars  RESP: lungs clear to auscultation - no rales, rhonchi or wheezes  CV: regular rate and rhythm, normal S1 S2, no S3 or S4, no murmur, click or rub, no peripheral edema  ABDOMEN: soft, nontender, no hepatosplenomegaly, no masses and bowel sounds normal  MS: no gross musculoskeletal defects noted, no edema        Signed Electronically by: Zora Strong MD    "

## 2024-04-29 NOTE — PATIENT INSTRUCTIONS
Go to pharmacy to get Tdap and RSV vaccines.     Take wellbutrin (purple pill) twice daily.     Someone will call you to schedule your stress test for your heart.

## 2024-05-16 DIAGNOSIS — M94.0 COSTOCHONDRITIS: ICD-10-CM

## 2024-05-16 RX ORDER — IBUPROFEN 600 MG/1
600 TABLET, FILM COATED ORAL EVERY 6 HOURS PRN
Qty: 90 TABLET | Refills: 0 | Status: SHIPPED | OUTPATIENT
Start: 2024-05-16

## 2024-05-16 NOTE — PROGRESS NOTES
Message to physician:     Date of last visit: 4/29/2024    Date of next visit if scheduled: 5/20/2024    Potassium   Date Value Ref Range Status   04/25/2024 4.2 3.4 - 5.3 mmol/L Final   02/17/2022 4.4 3.5 - 5.0 mmol/L Final   10/23/2020 4.5 3.4 - 5.3 mmol/L Final     Creatinine   Date Value Ref Range Status   04/25/2024 1.05 0.67 - 1.17 mg/dL Final   10/23/2020 0.8 0.8 - 1.5 mg/dL Final     GFR Estimate   Date Value Ref Range Status   04/25/2024 77 >60 mL/min/1.73m2 Final   06/25/2018 >60 >60 mL/min/1.73m2 Final       BP Readings from Last 3 Encounters:   04/29/24 106/68   04/26/24 100/63   04/25/24 122/73       Hemoglobin A1C   Date Value Ref Range Status   03/17/2023 5.6 0.0 - 5.6 % Final     Comment:     Normal <5.7%   Prediabetes 5.7-6.4%    Diabetes 6.5% or higher     Note: Adopted from ADA consensus guidelines.   05/20/2021 5.8 (H) 4.1 - 5.7 % Final       Please complete refill and CLOSE ENCOUNTER.  Closing the encounter signifies the refill is complete.

## 2024-05-20 ENCOUNTER — OFFICE VISIT (OUTPATIENT)
Dept: FAMILY MEDICINE | Facility: CLINIC | Age: 69
End: 2024-05-20
Payer: COMMERCIAL

## 2024-05-20 VITALS
HEART RATE: 61 BPM | HEIGHT: 63 IN | WEIGHT: 120 LBS | SYSTOLIC BLOOD PRESSURE: 92 MMHG | BODY MASS INDEX: 21.26 KG/M2 | RESPIRATION RATE: 18 BRPM | DIASTOLIC BLOOD PRESSURE: 57 MMHG | OXYGEN SATURATION: 97 % | TEMPERATURE: 98 F

## 2024-05-20 DIAGNOSIS — Z23 NEED FOR TDAP VACCINATION: ICD-10-CM

## 2024-05-20 DIAGNOSIS — Z29.11 NEED FOR VACCINATION AGAINST RESPIRATORY SYNCYTIAL VIRUS: ICD-10-CM

## 2024-05-20 DIAGNOSIS — R07.9 CHEST PAIN, UNSPECIFIED TYPE: Primary | ICD-10-CM

## 2024-05-20 DIAGNOSIS — F17.210 CIGARETTE NICOTINE DEPENDENCE WITHOUT COMPLICATION: ICD-10-CM

## 2024-05-20 PROCEDURE — G2211 COMPLEX E/M VISIT ADD ON: HCPCS

## 2024-05-20 PROCEDURE — 99214 OFFICE O/P EST MOD 30 MIN: CPT | Mod: GC

## 2024-05-20 RX ORDER — RESPIRATORY SYNCYTIAL VIRUS VACCINE 120MCG/0.5
0.5 KIT INTRAMUSCULAR ONCE
Qty: 1 EACH | Refills: 0 | Status: CANCELLED | OUTPATIENT
Start: 2024-05-20 | End: 2024-05-20

## 2024-05-20 NOTE — PROGRESS NOTES
Preceptor Attestation:  Patient's case reviewed and discussed with the resident, Zora Strong MD, and I personally evaluated the patient. I agree with written assessment and plan of care.    Supervising Physician:  Lana Verdin MD   [facility]

## 2024-05-20 NOTE — PATIENT INSTRUCTIONS
Call [facility] [employer] to schedule your RSV, Tdap, and shingles vaccines.     Call Cardiology to set up Lexiscan NM Stress Test: [phone]    Dental Clinics:     [place] [id number] [id number] [phone]        [facility]     [address] [address]        [facility] (D[facility])     [address] [place] [id number] [phone]        [facility]     [address] [place] [id number] [phone]

## 2024-05-20 NOTE — PROGRESS NOTES
Assessment & Plan     Chest pain, unspecified type  Recent ED visit 4/25 for left-sided chest pain.  Workup in ED unremarkable, so symptoms were attributed to costochondritis.  Over the past week, patient has continued to have episodes of left-sided chest pain associated with diaphoresis.  The episodes seem to be triggered by smoking marijuana or cigarettes.  He has not noticed exertion to be a trigger, but he is quite sedentary.  The episodes resolved spontaneously after a couple minutes.  ASCVD risk 11.9% -high risk given smoker.  Denies chest pain in the past week.  Ordered nuclear stress test at last visit as I do not think patient would be able to perform on a treadmill.  They have not yet scheduled this appointment, so I provided the phone number to do so today.  -Schedule nuclear med stress test    Cigarette nicotine dependence without complication, in early remission  Quit smoking 1 month ago.  Was motivated by his fear from the chest pain he had been having.  On Wellbutrin, which has been helping.  Using nicotine patches/lozenges only intermittently.  -Encouraged to continue cigarette cessation  -Next step is to stop smoking marijuana, which he does about twice daily.  -Continue Wellbutrin    Need for Tdap vaccination  Need for vaccination against respiratory syncytial virus  Due to insurance coverage, needs to call pharmacy to schedule appointment to receive Tdap, RSV, and shingles vaccines.    The longitudinal plan of care for the diagnosis(es)/condition(s) as documented were addressed during this visit. Due to the added complexity in care, I will continue to support Zafar in the subsequent management and with ongoing continuity of care.     No follow-ups on file.        Subjective   Zafar is a 68 year old, presenting for the following health issues:  RECHECK (Tremor happened on Wednesday and felt dizzy days before.)        5/20/2024     1:26 PM   Additional Questions   Roomed by Cade   Accompanied by  "Spouse         5/20/2024    Information    services provided? No     HPI     PMH: lung adenocarcinoma (s/p treatment in 2022, now in remission), tobacco use disorder, hyperlipidemia     Last visit was 4/29 for chest pain, Mostly triggered by smoking marijuana or cigarettes.  Ordered nuclear med stress test.  This has not yet been scheduled per chart review.  Also discussed smoking cessation at that visit, and patient had quit completely at that time due to fear about the chest pain.      Chest pain:  Did have minor chest pain once since last visit, resolved     Tobacco use disorder:  Totally quit cigarettes! Felt motivated by the chest pain.   Smoking marijuana about twice a day.     Had 1 episode last week of feeling dizzy when getting out of a very hot bath.  The dizziness resolved shortly after sitting down.    Moving into public housing on August 1st.         Objective    BP 92/57   Pulse 61   Temp 98  F (36.7  C) (Skin)   Resp 18   Ht 1.6 m (5' 3\")   Wt 54.4 kg (120 lb)   SpO2 97%   BMI 21.26 kg/m    Body mass index is 21.26 kg/m .    Wt Readings from Last 4 Encounters:   05/20/24 54.4 kg (120 lb)   04/29/24 52.2 kg (115 lb)   04/26/24 52.6 kg (116 lb)   04/25/24 53.1 kg (117 lb 1.6 oz)        Physical Exam   GENERAL: alert and no distress  NECK: no adenopathy, no asymmetry, masses, or scars  RESP: lungs clear to auscultation - no rales, rhonchi or wheezes  CV: regular rate and rhythm, normal S1 S2, no S3 or S4, no murmur, click or rub, no peripheral edema  ABDOMEN: soft, nontender, no hepatosplenomegaly, no masses and bowel sounds normal  MS: no gross musculoskeletal defects noted, no edema      Signed Electronically by: Zora Strong MD    "

## 2024-05-28 ENCOUNTER — HOSPITAL ENCOUNTER (OUTPATIENT)
Dept: CARDIOLOGY | Facility: HOSPITAL | Age: 69
Discharge: HOME | End: 2024-05-28
Attending: FAMILY MEDICINE
Payer: COMMERCIAL

## 2024-05-28 ENCOUNTER — HOSPITAL ENCOUNTER (OUTPATIENT)
Dept: NUCLEAR MEDICINE | Facility: HOSPITAL | Age: 69
Discharge: HOME | End: 2024-05-28
Attending: FAMILY MEDICINE
Payer: COMMERCIAL

## 2024-05-28 DIAGNOSIS — R07.9 CHEST PAIN, UNSPECIFIED TYPE: ICD-10-CM

## 2024-05-28 LAB
CV STRESS CURRENT BP HE: NORMAL
CV STRESS CURRENT HR HE: 104
CV STRESS CURRENT HR HE: 105
CV STRESS CURRENT HR HE: 50
CV STRESS CURRENT HR HE: 56
CV STRESS CURRENT HR HE: 56
CV STRESS CURRENT HR HE: 66
CV STRESS CURRENT HR HE: 66
CV STRESS CURRENT HR HE: 69
CV STRESS CURRENT HR HE: 70
CV STRESS CURRENT HR HE: 70
CV STRESS CURRENT HR HE: 71
CV STRESS CURRENT HR HE: 73
CV STRESS CURRENT HR HE: 75
CV STRESS CURRENT HR HE: 75
CV STRESS CURRENT HR HE: 95
CV STRESS CURRENT HR HE: 96
CV STRESS DEVIATION TIME HE: NORMAL
CV STRESS ECHO PERCENT HR HE: NORMAL
CV STRESS EXERCISE STAGE HE: NORMAL
CV STRESS FINAL RESTING BP HE: NORMAL
CV STRESS FINAL RESTING HR HE: 66
CV STRESS MAX HR HE: 106
CV STRESS MAX TREADMILL GRADE HE: 0
CV STRESS MAX TREADMILL SPEED HE: 0
CV STRESS PEAK DIA BP HE: NORMAL
CV STRESS PEAK SYS BP HE: NORMAL
CV STRESS PHASE HE: NORMAL
CV STRESS PROTOCOL HE: NORMAL
CV STRESS RESTING PT POSITION HE: NORMAL
CV STRESS ST DEVIATION AMOUNT HE: NORMAL
CV STRESS ST DEVIATION ELEVATION HE: NORMAL
CV STRESS ST EVELATION AMOUNT HE: NORMAL
CV STRESS TEST TYPE HE: NORMAL
CV STRESS TOTAL STAGE TIME MIN 1 HE: NORMAL
RATE PRESSURE PRODUCT: NORMAL
STRESS ECHO BASELINE DIASTOLIC HE: 71
STRESS ECHO BASELINE HR: 50
STRESS ECHO BASELINE SYSTOLIC BP: 118
STRESS ECHO CALCULATED PERCENT HR: 70 %
STRESS ECHO LAST STRESS DIASTOLIC BP: 87
STRESS ECHO LAST STRESS HR: 75
STRESS ECHO LAST STRESS SYSTOLIC BP: 146
STRESS ECHO TARGET HR: 152

## 2024-05-28 PROCEDURE — 78452 HT MUSCLE IMAGE SPECT MULT: CPT | Mod: 26 | Performed by: INTERNAL MEDICINE

## 2024-05-28 PROCEDURE — 93018 CV STRESS TEST I&R ONLY: CPT | Performed by: INTERNAL MEDICINE

## 2024-05-28 PROCEDURE — A9500 TC99M SESTAMIBI: HCPCS | Performed by: FAMILY MEDICINE

## 2024-05-28 PROCEDURE — 343N000001 HC RX 343: Performed by: FAMILY MEDICINE

## 2024-05-28 PROCEDURE — 93017 CV STRESS TEST TRACING ONLY: CPT

## 2024-05-28 PROCEDURE — 93016 CV STRESS TEST SUPVJ ONLY: CPT | Performed by: INTERNAL MEDICINE

## 2024-05-28 PROCEDURE — 250N000011 HC RX IP 250 OP 636: Performed by: FAMILY MEDICINE

## 2024-05-28 PROCEDURE — 78452 HT MUSCLE IMAGE SPECT MULT: CPT

## 2024-05-28 RX ORDER — AMINOPHYLLINE 25 MG/ML
50 INJECTION, SOLUTION INTRAVENOUS
Status: DISCONTINUED | OUTPATIENT
Start: 2024-05-28 | End: 2024-05-28 | Stop reason: HOSPADM

## 2024-05-28 RX ORDER — REGADENOSON 0.08 MG/ML
0.4 INJECTION, SOLUTION INTRAVENOUS ONCE
Status: COMPLETED | OUTPATIENT
Start: 2024-05-28 | End: 2024-05-28

## 2024-05-28 RX ADMIN — Medication 33 MILLICURIE: at 12:40

## 2024-05-28 RX ADMIN — Medication 8.8 MILLICURIE: at 10:45

## 2024-05-28 RX ADMIN — REGADENOSON 0.4 MG: 0.08 INJECTION, SOLUTION INTRAVENOUS at 12:34

## 2024-06-24 ENCOUNTER — OFFICE VISIT (OUTPATIENT)
Dept: FAMILY MEDICINE | Facility: CLINIC | Age: 69
End: 2024-06-24
Payer: COMMERCIAL

## 2024-06-24 VITALS
OXYGEN SATURATION: 96 % | WEIGHT: 117 LBS | BODY MASS INDEX: 20.73 KG/M2 | HEART RATE: 75 BPM | SYSTOLIC BLOOD PRESSURE: 90 MMHG | RESPIRATION RATE: 20 BRPM | DIASTOLIC BLOOD PRESSURE: 59 MMHG

## 2024-06-24 DIAGNOSIS — Z23 NEED FOR TDAP VACCINATION: ICD-10-CM

## 2024-06-24 DIAGNOSIS — R05.1 ACUTE COUGH: Primary | ICD-10-CM

## 2024-06-24 DIAGNOSIS — Z29.11 NEED FOR VACCINATION AGAINST RESPIRATORY SYNCYTIAL VIRUS: ICD-10-CM

## 2024-06-24 DIAGNOSIS — J43.9 PULMONARY EMPHYSEMA, UNSPECIFIED EMPHYSEMA TYPE (H): ICD-10-CM

## 2024-06-24 LAB
FLUAV RNA SPEC QL NAA+PROBE: NEGATIVE
FLUBV RNA RESP QL NAA+PROBE: NEGATIVE
RSV RNA SPEC NAA+PROBE: NEGATIVE
SARS-COV-2 RNA RESP QL NAA+PROBE: NEGATIVE

## 2024-06-24 PROCEDURE — 99214 OFFICE O/P EST MOD 30 MIN: CPT | Mod: GC

## 2024-06-24 PROCEDURE — 87637 SARSCOV2&INF A&B&RSV AMP PRB: CPT

## 2024-06-24 RX ORDER — ALBUTEROL SULFATE 90 UG/1
2 AEROSOL, METERED RESPIRATORY (INHALATION) EVERY 6 HOURS PRN
Qty: 18 G | Refills: 1 | Status: SHIPPED | OUTPATIENT
Start: 2024-06-24

## 2024-06-24 RX ORDER — UMECLIDINIUM 62.5 UG/1
1 AEROSOL, POWDER ORAL DAILY
Qty: 30 EACH | Refills: 1 | Status: SHIPPED | OUTPATIENT
Start: 2024-06-24

## 2024-06-24 RX ORDER — RESPIRATORY SYNCYTIAL VIRUS VACCINE 120MCG/0.5
0.5 KIT INTRAMUSCULAR ONCE
Qty: 1 EACH | Refills: 0 | Status: CANCELLED | OUTPATIENT
Start: 2024-06-24 | End: 2024-06-24

## 2024-06-24 NOTE — PROGRESS NOTES
"  Assessment & Plan     (R05.1) Acute cough  (primary encounter diagnosis)  Comment: Cough, productive with yellow sputum with subjective fevers, started last night, however afebrile today. Will check for viral etiology. Continue PRN tylenol and PO hydration at home. No focal lung findings on exam and stable vitals. No wheezing, rhonchi, or rales appreciated on exam, low suspicion for bacterial pneumonia. Return if symptoms not improving.   Plan: Symptomatic Influenza A/B, RSV, & SARS-CoV2 PCR        (COVID-19)      (J43.9) Pulmonary emphysema, unspecified emphysema type (H)  Comment: On and off cough and SOB the past few weeks, significant smoking history, previous CT Chest demonstrating emphysema. Will start maintenance therapy for COPD with control and rescue inhalers, educated on proper inhaler technique. Last PFT's 2022, will send referral to repeat.   Plan: umeclidinium (INCRUSE ELLIPTA) 62.5 MCG/ACT         inhaler, albuterol (PROAIR HFA/PROVENTIL         HFA/VENTOLIN HFA) 108 (90 Base) MCG/ACT         inhaler, General PFT Lab (Please always keep         checked), Pulmonary Function Test    Return if symptoms worsen or fail to improve.    González Stephen is a 68 year old, presenting for the following health issues:  Cough      6/24/2024     1:09 PM   Additional Questions   Roomed by Ety   Accompanied by Self         6/24/2024    Information    services provided? Katherine Stephen is a 69 y/o M presenting to clinic today for evaluation of cough.   HPI:  Woke up hot, sweaty last night, felt like he was \"wheezy\", cough productive of mucus yellow, denies blood. Feels like he has been congested and a little more difficulty breathing, especially when lying down. No sick contacts. Endorses a sore throat. Denies N/V. Has been able to keep PO intake down. Has been having some coughing on and off. Reports he has not been smoking for the past 2 months. Still endorses marijuana use daily which he " is smoking.     Zafar was recently seen in clinic in 4/2024 with chest pain, had stress test done 5/28 with cardiology which was negative for inducible ischemia. CT chest w/out contrast scheduled for 9/2024 for lung cancer screening. He had a recent CT chest noncon 3/2024 which showed no evidence of change or metastatic disease.                        Objective    There were no vitals taken for this visit.  There is no height or weight on file to calculate BMI.  Physical Exam  Constitutional:       General: He is not in acute distress.     Appearance: Normal appearance. He is not ill-appearing or diaphoretic.   HENT:      Right Ear: Tympanic membrane and ear canal normal.      Left Ear: Tympanic membrane and ear canal normal.      Nose: Congestion present.      Mouth/Throat:      Pharynx: Oropharynx is clear. Posterior oropharyngeal erythema present. No oropharyngeal exudate.   Eyes:      Extraocular Movements: Extraocular movements intact.      Conjunctiva/sclera: Conjunctivae normal.      Pupils: Pupils are equal, round, and reactive to light.   Cardiovascular:      Rate and Rhythm: Normal rate and regular rhythm.      Pulses: Normal pulses.      Heart sounds: Normal heart sounds.   Pulmonary:      Effort: Pulmonary effort is normal. No respiratory distress.      Breath sounds: Normal breath sounds. No wheezing, rhonchi or rales.   Chest:      Chest wall: Tenderness present.   Abdominal:      General: Bowel sounds are normal.      Palpations: Abdomen is soft.   Musculoskeletal:      Right lower leg: No edema.      Left lower leg: No edema.   Skin:     Findings: No rash.   Neurological:      General: No focal deficit present.      Mental Status: He is alert. Mental status is at baseline.              Signed Electronically by: MD Theo Jones MD  PGY-1  [facility] Medicine Residency  [facility]   June 24, 2024

## 2024-06-24 NOTE — PROGRESS NOTES
Preceptor Attestation:  Patient's case reviewed and discussed with the resident, Theo Edwards MD, and I personally evaluated the patient. I agree with written assessment and plan of care.    Supervising Physician:  Lana Verdin MD   [facility]

## 2024-06-24 NOTE — PATIENT INSTRUCTIONS
Start using incruse elipta inhaler daily for symptom management, take while still feeling sick daily then as needed  Will send albuterol inhaler for as needed if symptoms not improving with daily inhaler  Will send referral for pulmonary function testing, they will call to set up  Continue oral hydration, tylenol as needed for symptoms.

## 2024-06-26 ENCOUNTER — OFFICE VISIT (OUTPATIENT)
Dept: PULMONOLOGY | Facility: CLINIC | Age: 69
End: 2024-06-26
Attending: FAMILY MEDICINE
Payer: COMMERCIAL

## 2024-06-26 DIAGNOSIS — J43.9 PULMONARY EMPHYSEMA, UNSPECIFIED EMPHYSEMA TYPE (H): ICD-10-CM

## 2024-06-26 LAB — HGB BLD-MCNC: 12.3 G/DL

## 2024-06-26 PROCEDURE — 94726 PLETHYSMOGRAPHY LUNG VOLUMES: CPT | Mod: 26 | Performed by: INTERNAL MEDICINE

## 2024-06-26 PROCEDURE — 94729 DIFFUSING CAPACITY: CPT | Mod: 26 | Performed by: INTERNAL MEDICINE

## 2024-06-26 PROCEDURE — 85018 HEMOGLOBIN: CPT | Mod: QW | Performed by: INTERNAL MEDICINE

## 2024-06-26 PROCEDURE — 94375 RESPIRATORY FLOW VOLUME LOOP: CPT | Mod: 26 | Performed by: INTERNAL MEDICINE

## 2024-06-26 NOTE — LETTER
July 8, 2024      Zafar Spencer  [address]  [address]  [facility] [address]      Dear ,    We are writing to inform you of your test results.    You had normal testing but would recommend continuing prescribed inhaler until Upper respiratory symptoms improve. Please make an appointment to discuss if symptoms not improving.     Resulted Orders   General PFT Lab (Please always keep checked)   Result Value Ref Range    FVC-Pred 3.33 L    FVC-Pre 3.66 L    FVC-%Pred-Pre 109 %    FEV1-Pre 3.02 L    FEV1-%Pred-Pre 117 %    FEV1FVC-Pred 77 %    FEV1FVC-Pre 83 %    FEFMax-Pred 6.96 L/sec    FEFMax-Pre 8.09 L/sec    FEFMax-%Pred-Pre 116 %    FEF2575-Pred 2.09 L/sec    FEF2575-Pre 3.20 L/sec    JNE6626-%Pred-Pre 152 %    ExpTime-Pre 5.38 sec    FIFMax-Pre 3.29 L/sec    VC-Pred 3.48 L    VC-Pre 3.39 L    VC-%Pred-Pre 97 %    IC-Pred 2.21 L    IC-Pre 2.00 L    IC-%Pred-Pre 90 %    ERV-Pred 1.10 L    ERV-Pre 1.40 L    ERV-%Pred-Pre 126 %    FEV1FEV6-Pred 78 %    FEV1FEV6-Pre 85 %    FRCPleth-Pred 3.28 L    FRCPleth-Pre 4.42 L    FRCPleth-%Pred-Pre 134 %    RVPleth-Pred 2.38 L    RVPleth-Pre 3.02 L    RVPleth-%Pred-Pre 126 %    TLCPleth-Pred 5.71 L    TLCPleth-Pre 6.42 L    TLCPleth-%Pred-Pre 112 %    DLCOunc-Pred 21.06 ml/min/mmHg    DLCOunc-Pre 17.09 ml/min/mmHg    DLCOunc-%Pred-Pre 81 %    DLCOcor-Pre 18.41 ml/min/mmHg    DLCOcor-%Pred-Pre 87 %    VA-Pre 5.48 L    VA-%Pred-Pre 109 %    FEV1SVC-Pred 74 %    FEV1SVC-Pre 89 %    Narrative    The FVC, FEV1, FEV1/FVC ratio and FHF42-70% are within normal limits.  The inspiratory flow rates are within normal limits.  Lung volumes are within normal limits.  The diffusing capacity is normal.        The results are within normal limits.        IMPRESSION:    Normal Pulmonary Function                        ____________________________________________M.D.              This interpretation has been electronically signed:  Washington Godoy 07/02/2024  06:09:14 AM         If  you have any questions or concerns, please call the clinic at the number listed above.       Sincerely,      Jerry Venegas MD

## 2024-07-02 LAB
DLCOCOR-%PRED-PRE: 87 %
DLCOCOR-PRE: 18.41 ML/MIN/MMHG
DLCOUNC-%PRED-PRE: 81 %
DLCOUNC-PRE: 17.09 ML/MIN/MMHG
DLCOUNC-PRED: 21.06 ML/MIN/MMHG
ERV-%PRED-PRE: 126 %
ERV-PRE: 1.4 L
ERV-PRED: 1.1 L
EXPTIME-PRE: 5.38 SEC
FEF2575-%PRED-PRE: 152 %
FEF2575-PRE: 3.2 L/SEC
FEF2575-PRED: 2.09 L/SEC
FEFMAX-%PRED-PRE: 116 %
FEFMAX-PRE: 8.09 L/SEC
FEFMAX-PRED: 6.96 L/SEC
FEV1-%PRED-PRE: 117 %
FEV1-PRE: 3.02 L
FEV1FEV6-PRE: 85 %
FEV1FEV6-PRED: 78 %
FEV1FVC-PRE: 83 %
FEV1FVC-PRED: 77 %
FEV1SVC-PRE: 89 %
FEV1SVC-PRED: 74 %
FIFMAX-PRE: 3.29 L/SEC
FRCPLETH-%PRED-PRE: 134 %
FRCPLETH-PRE: 4.42 L
FRCPLETH-PRED: 3.28 L
FVC-%PRED-PRE: 109 %
FVC-PRE: 3.66 L
FVC-PRED: 3.33 L
IC-%PRED-PRE: 90 %
IC-PRE: 2 L
IC-PRED: 2.21 L
RVPLETH-%PRED-PRE: 126 %
RVPLETH-PRE: 3.02 L
RVPLETH-PRED: 2.38 L
TLCPLETH-%PRED-PRE: 112 %
TLCPLETH-PRE: 6.42 L
TLCPLETH-PRED: 5.71 L
VA-%PRED-PRE: 109 %
VA-PRE: 5.48 L
VC-%PRED-PRE: 97 %
VC-PRE: 3.39 L
VC-PRED: 3.48 L

## 2024-07-27 DIAGNOSIS — Z72.0 SMOKING TRYING TO QUIT: ICD-10-CM

## 2024-07-27 DIAGNOSIS — F17.210 CIGARETTE NICOTINE DEPENDENCE WITHOUT COMPLICATION: ICD-10-CM

## 2024-07-29 RX ORDER — BUPROPION HYDROCHLORIDE 150 MG/1
150 TABLET, EXTENDED RELEASE ORAL 2 TIMES DAILY
Qty: 180 TABLET | Refills: 2 | Status: SHIPPED | OUTPATIENT
Start: 2024-07-29

## 2024-07-29 NOTE — TELEPHONE ENCOUNTER
Outside of RN protocol, due to red alerts:  Rx Protocol Bupropion Failed   Protocol Details Medication is indicated for associated diagnosis    Blood pressure on file in the past 12 months   Will route to primary to review. Thank you. Savanna PERKINS

## 2024-08-07 ENCOUNTER — TELEPHONE (OUTPATIENT)
Dept: VASCULAR SURGERY | Facility: CLINIC | Age: 69
End: 2024-08-07
Payer: COMMERCIAL

## 2024-08-07 NOTE — TELEPHONE ENCOUNTER
[facility] to schedule 1 year follow up with Dr. Joshi and imaging before. Okay to wait until she returns and schedule in December/January. [phone]            Jayashree Perez, RN   [facility]  Adi December/January          Previous Messages       ----- Message -----  From: Syeda Jones  Sent: 8/1/2024   7:56 AM CDT  To: Jayashree Perez RN    Review for follow up with pierce Joshi  ----- Message -----  From: Jayashree Perez, RN  Sent: 11/2/2023  10:42 AM CDT  To: *    Follow up 1 year with Dr. Joshi with aorta iliac ultrasound prior.  Order is in.  Ectatic common iliac arteries and LLE varicose veins.

## 2024-08-22 NOTE — PROGRESS NOTES
ONGOING DISCHARGE PLAN:    Patient is alert and oriented x4.    Spoke with patient regarding discharge plan and patient confirms that plan is still [facility]. Authorization started on 8/16.     Cardiac Consult-NSTEMI, heart cath on 8/20 at [facility]'s.     Will continue to follow for additional discharge needs.    If patient is discharged prior to next notation, then this note serves as note for discharge by case management.    Electronically signed by Sabra Givens RN on 8/22/2024 at 2:34 PM     Radiation Therapy Patient Education    Person involved with teaching: Patient and Wife    Patient educational needs for self management of treatment-related side effects assessment completed.  EPIC Patient Ed tab contains Patient Learning Assessment    Education Materials Given  Radiation Therapy and You, Understanding Radiation Therapy, Radiation Therapy Team, Radiation Therapy Treatment, Radiation Therapy: Managing Short-Term Side Effects, Radiosurgery Pathway, Welcome Letter and Insurance PA Information    Educational Topics Discussed  Understanding Radiation Therapy  Side effects expected  Radiosurgery Pathway    Response To Teaching  More review necessary    Referrals sent: None    Chemotherapy?  No

## 2024-09-03 ENCOUNTER — HOSPITAL ENCOUNTER (OUTPATIENT)
Dept: CT IMAGING | Facility: HOSPITAL | Age: 69
Discharge: HOME | End: 2024-09-03
Attending: RADIOLOGY | Admitting: RADIOLOGY
Payer: COMMERCIAL

## 2024-09-03 DIAGNOSIS — C34.12 MALIGNANT NEOPLASM OF UPPER LOBE OF LEFT LUNG (H): ICD-10-CM

## 2024-09-03 PROCEDURE — 71250 CT THORAX DX C-: CPT

## 2024-09-05 ENCOUNTER — OFFICE VISIT (OUTPATIENT)
Dept: RADIATION ONCOLOGY | Facility: HOSPITAL | Age: 69
End: 2024-09-05
Attending: RADIOLOGY
Payer: COMMERCIAL

## 2024-09-05 DIAGNOSIS — C34.12 MALIGNANT NEOPLASM OF UPPER LOBE OF LEFT LUNG (H): Primary | ICD-10-CM

## 2024-09-05 PROCEDURE — G0463 HOSPITAL OUTPT CLINIC VISIT: HCPCS | Performed by: RADIOLOGY

## 2024-09-05 PROCEDURE — G2211 COMPLEX E/M VISIT ADD ON: HCPCS | Performed by: RADIOLOGY

## 2024-09-05 PROCEDURE — 99214 OFFICE O/P EST MOD 30 MIN: CPT | Performed by: RADIOLOGY

## 2024-09-05 NOTE — LETTER
9/5/2024      Zafar Spencer  [address]  [address]  [facility] [address]      Dear Colleague,    Thank you for referring your patient, Zafar Spencer, to the [facility] RADIATION ONCOLOGY [place]. Please see a copy of my visit note below.    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  The patient is not a good candidate for surgery given his poor medical status.       TYPE OF RADIATION THERAPY ADMINISTERED:  4500 cGy in 5 treatments given from 6/27/2022 - 7/7/2022.     INTERVAL SINCE COMPLETION OF RADIATION THERAPY: 2 years and 2 months.      SUBJECTIVE:  Mr. Spencer is a 69 year old male who presents with an asymptomatic LEFT upper lobe nodule identified on lung cancer screening CT. patient had a few screening CT chest over the past few years.  A most recent CT chest on 2/19/2022 reviewed new irregular nodule within the left upper lobe measuring 12 x 10 x 5 mm and a second 16 x 7 x 3 mm abnormality in the right lower lobe.  This is worrisome for malignancy.  PET CT scan on 3/28/2022 showed FDG avid spiculated left upper lobe pleural-based mass suspicious for malignancy.  There is no FDG uptake in the right lower lobe lesion.  There is no radiographic evidence of lymph nodes and systemic metastasis.  Patient is not a good candidate for surgery given his current medical status. Patient received stereotactic radiosurgery for his lung cancer with a total dose of 4500 cGy in 5 treatments given from 6/27/2022 - 7/7/2022.  The patient tolerated ration therapy very well with minimal side effect.      The patient has been doing well since completion of the radiation therapy.  He denies any pain and discomfort at the time of evaluation.  He is here for routine post therapy office follow-up.     Medications were reviewed and are up to date on  EPIC.    The following portions of the patient's history were reviewed and updated as appropriate: allergies, current medications, past family history, past medical history, past social history, past surgical history and problem list.    Review of Systems:      General  Constitutional  Constitutional (WDL): Exceptions to WDL  Fatigue: Fatigue relieved by rest  EENT  Eye Disorders  Eye Disorder (WDL): Assessment not pertinent to visit  Ear Disorders  Ear Disorder (WDL): Assessment not pertinent to visit  Respiratory  Respiratory  Respiratory (WDL): Exceptions to WDL  Cough: Mild symptoms OR nonprescription intervention indicated  Dyspnea: Shortness of breath with moderate exertion  Cardiovascular  Cardiovascular  Cardiovascular (WDL): All cardiovascular elements are within defined limits  Gastrointestinal  Gastrointestinal  Gastrointestinal (WDL): All gastrointestinal elements are within defined limits  Musculoskeletal  Musculoskeletal and Connective Tissue Disorders  Musculoskeletal & Connective (WDL): Exceptions to WDL  Arthralgia: Mild pain  Integumentary  Integumentary  Integumentary (WDL): All integumentary elements are within defined limits  Neurological  Neurosensory  Neurosensory (WDL): All neurosensory elements are within defined limits  Genitourinary/Reproductive  Genitourinary  Genitourinary (WDL): All genitourinary elements are within defined limits  Lymphatic  Lymph System Disorders  Lymph (WDL): All lymph elements are within defined limits  Pain  Pain Score: No Pain (0)  AUA Assessment                                                              Accompanied by  Accompanied By: significant other    Objective:      PHYSICAL EXAMINATION:    There were no vitals taken for this visit.    Gen: Alert, in NAD  Eyes: PERRL, EOMI, sclera anicteric  Neck: Supple, full ROM, no LAD  Pulm: No wheezing, stridor or respiratory distress  CV: Well-perfused, no cyanosis, no pedal edema  Back: No step-offs or pain to  palpation along the thoracolumbar spine  Rectal: Deferred  : Deferred  Musculoskeletal: Normal muscle bulk and tone  Skin: Normal color and turgor  Neurologic: A/Ox3, CN II-XII intact, normal gait and station  Psychiatric: Appropriate mood and affect     Imaging: Imaging results 30 days: CT Chest w/o contrast    Result Date: 9/3/2024  EXAM: CT CHEST W/O CONTRAST LOCATION: [facility] DATE: 9/3/2024 INDICATION:  Malignant neoplasm of upper lobe of left lung (H) COMPARISON: 4/25/2024 TECHNIQUE: CT chest without IV contrast. Multiplanar reformats were obtained. Dose reduction techniques were used. CONTRAST: None. FINDINGS: LUNGS AND PLEURA: Stable posttreatment change and fiducial marker in the left upper lobe. No new or growing pulmonary nodules. No pleural effusions. Emphysema. MEDIASTINUM/AXILLAE: No thoracic aortic aneurysm. A lymphadenopathy. CORONARY ARTERY CALCIFICATION: Mild. UPPER ABDOMEN: No significant finding. MUSCULOSKELETAL: Unremarkable.     IMPRESSION: 1.  No evidence of recurrent or metastatic disease in the chest.       Impression     The patient is a 69-year-old gentleman with a diagnosis of early stage lung cancer status post SBRT completed 2 years and  2 months ago.  The patient has been doing well with no evidence of recurrence     Assessment & Plan:     1.  I have reviewed his restaging CT scan and compared to the previous radiation therapy field and CT scan.  There is no evidence of cancer recurrence.  He is scheduled return to radiation oncology in 6 months for office follow-up and CT chest.     2.  Continue follow-up with Dr. Kirk Azevedo, PCP as planned    Pain Management Plan: NA    Face to face time  30 minutes with > 80% spent on consultation, education and coordination of care.    Janessa Yanes MD  Department of Radiation Oncology   [facility] Radiation Oncology  Tel: [phone]  Page: [phone]    [id number] [place] [address]  "    [facility]   [address] [place] [address]    CC:  Patient Care Team:  Zora Strong MD as PCP - General (Student in organized health care education/training program)  Janessa Yanes MD as MD (Radiation Oncology)  Janessa Yanes MD as Assigned Cancer Care Provider  Lex Joshi MD as Assigned Heart and Vascular Provider  Zora Strong MD as Assigned PCP      Oncology Rooming Note    September 5, 2024 10:48 AM   Zafar Spencer is a 69 year old male who presents for:    Chief Complaint   Patient presents with     Oncology Clinic Visit     Radiation Therapy     Follow up     Initial Vitals: There were no vitals taken for this visit. Estimated body mass index is 20.73 kg/m  as calculated from the following:    Height as of 5/20/24: 1.6 m (5' 3\").    Weight as of 6/24/24: 53.1 kg (117 lb). There is no height or weight on file to calculate BSA.  No Pain (0) Comment: Data Unavailable   No LMP for male patient.  Allergies reviewed: Yes  Medications reviewed: Yes    Medications: Medication refills not needed today.  Pharmacy name entered into [id number] [place] [address]    Frailty Screening:   Is the patient here for a new oncology consult visit in cancer care? 2. No      Clinical concerns: Follow up, CT done on 9/3  Dr. Yanes was notified.      Brittany Victor, MADDIE              Again, thank you for allowing me to participate in the care of your patient.        Sincerely,        Janessa Yanes MD  "

## 2024-09-05 NOTE — PROGRESS NOTES
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  The patient is not a good candidate for surgery given his poor medical status.       TYPE OF RADIATION THERAPY ADMINISTERED:  4500 cGy in 5 treatments given from 6/27/2022 - 7/7/2022.     INTERVAL SINCE COMPLETION OF RADIATION THERAPY: 2 years and 2 months.      SUBJECTIVE:  Mr. Spencer is a 69 year old male who presents with an asymptomatic LEFT upper lobe nodule identified on lung cancer screening CT. patient had a few screening CT chest over the past few years.  A most recent CT chest on 2/19/2022 reviewed new irregular nodule within the left upper lobe measuring 12 x 10 x 5 mm and a second 16 x 7 x 3 mm abnormality in the right lower lobe.  This is worrisome for malignancy.  PET CT scan on 3/28/2022 showed FDG avid spiculated left upper lobe pleural-based mass suspicious for malignancy.  There is no FDG uptake in the right lower lobe lesion.  There is no radiographic evidence of lymph nodes and systemic metastasis.  Patient is not a good candidate for surgery given his current medical status. Patient received stereotactic radiosurgery for his lung cancer with a total dose of 4500 cGy in 5 treatments given from 6/27/2022 - 7/7/2022.  The patient tolerated ration therapy very well with minimal side effect.      The patient has been doing well since completion of the radiation therapy.  He denies any pain and discomfort at the time of evaluation.  He is here for routine post therapy office follow-up.     Medications were reviewed and are up to date on EPIC.    The following portions of the patient's history were reviewed and updated as appropriate: allergies, current medications, past family history, past medical history, past social history, past surgical history and problem list.    Review of Systems:       General  Constitutional  Constitutional (WDL): Exceptions to WDL  Fatigue: Fatigue relieved by rest  EENT  Eye Disorders  Eye Disorder (WDL): Assessment not pertinent to visit  Ear Disorders  Ear Disorder (WDL): Assessment not pertinent to visit  Respiratory  Respiratory  Respiratory (WDL): Exceptions to WDL  Cough: Mild symptoms OR nonprescription intervention indicated  Dyspnea: Shortness of breath with moderate exertion  Cardiovascular  Cardiovascular  Cardiovascular (WDL): All cardiovascular elements are within defined limits  Gastrointestinal  Gastrointestinal  Gastrointestinal (WDL): All gastrointestinal elements are within defined limits  Musculoskeletal  Musculoskeletal and Connective Tissue Disorders  Musculoskeletal & Connective (WDL): Exceptions to WDL  Arthralgia: Mild pain  Integumentary  Integumentary  Integumentary (WDL): All integumentary elements are within defined limits  Neurological  Neurosensory  Neurosensory (WDL): All neurosensory elements are within defined limits  Genitourinary/Reproductive  Genitourinary  Genitourinary (WDL): All genitourinary elements are within defined limits  Lymphatic  Lymph System Disorders  Lymph (WDL): All lymph elements are within defined limits  Pain  Pain Score: No Pain (0)  AUA Assessment                                                              Accompanied by  Accompanied By: significant other    Objective:      PHYSICAL EXAMINATION:    There were no vitals taken for this visit.    Gen: Alert, in NAD  Eyes: PERRL, EOMI, sclera anicteric  Neck: Supple, full ROM, no LAD  Pulm: No wheezing, stridor or respiratory distress  CV: Well-perfused, no cyanosis, no pedal edema  Back: No step-offs or pain to palpation along the thoracolumbar spine  Rectal: Deferred  : Deferred  Musculoskeletal: Normal muscle bulk and tone  Skin: Normal color and turgor  Neurologic: A/Ox3, CN II-XII intact, normal gait and station  Psychiatric: Appropriate mood and affect      Imaging: Imaging results 30 days: CT Chest w/o contrast    Result Date: 9/3/2024  EXAM: CT CHEST W/O CONTRAST LOCATION: [facility] DATE: 9/3/2024 INDICATION:  Malignant neoplasm of upper lobe of left lung (H) COMPARISON: 4/25/2024 TECHNIQUE: CT chest without IV contrast. Multiplanar reformats were obtained. Dose reduction techniques were used. CONTRAST: None. FINDINGS: LUNGS AND PLEURA: Stable posttreatment change and fiducial marker in the left upper lobe. No new or growing pulmonary nodules. No pleural effusions. Emphysema. MEDIASTINUM/AXILLAE: No thoracic aortic aneurysm. A lymphadenopathy. CORONARY ARTERY CALCIFICATION: Mild. UPPER ABDOMEN: No significant finding. MUSCULOSKELETAL: Unremarkable.     IMPRESSION: 1.  No evidence of recurrent or metastatic disease in the chest.       Impression     The patient is a 69-year-old gentleman with a diagnosis of early stage lung cancer status post SBRT completed 2 years and  2 months ago.  The patient has been doing well with no evidence of recurrence     Assessment & Plan:     1.  I have reviewed his restaging CT scan and compared to the previous radiation therapy field and CT scan.  There is no evidence of cancer recurrence.  He is scheduled return to radiation oncology in 6 months for office follow-up and CT chest.     2.  Continue follow-up with Dr. Kirk Azevedo, PCP as planned    Pain Management Plan: NA    Face to face time  30 minutes with > 80% spent on consultation, education and coordination of care.    Janessa Yanes MD  Department of Radiation Oncology   [facility] Radiation Oncology  Tel: [phone]  Page: [phone]    [facility]  [address] [place] [address]     [address]   [place] [address]    CC:  Patient Care Team:  Zora Strong MD as PCP - General (Student in organized health care education/training program)  Janessa Yanes MD as MD (Radiation Oncology)  Janessa Yanes MD as  Assigned Cancer Care Provider  Lex Joshi MD as Assigned Heart and Vascular Provider  Zora Strong MD as Assigned PCP

## 2024-09-05 NOTE — PROGRESS NOTES
"Oncology Rooming Note    September 5, 2024 10:48 AM   Zafar Spencer is a 69 year old male who presents for:    Chief Complaint   Patient presents with    Oncology Clinic Visit    Radiation Therapy     Follow up     Initial Vitals: There were no vitals taken for this visit. Estimated body mass index is 20.73 kg/m  as calculated from the following:    Height as of 5/20/24: 1.6 m (5' 3\").    Weight as of 6/24/24: 53.1 kg (117 lb). There is no height or weight on file to calculate BSA.  No Pain (0) Comment: Data Unavailable   No LMP for male patient.  Allergies reviewed: Yes  Medications reviewed: Yes    Medications: Medication refills not needed today.  Pharmacy [facility] into [id number] [place] [address]    Frailty Screening:   Is the patient here for a new oncology consult visit in cancer care? 2. No      Clinical concerns: Follow up, CT done on 9/3  Dr. Yanes was notified.      Brittany Victor RN            "

## 2024-10-23 ENCOUNTER — TELEPHONE (OUTPATIENT)
Dept: FAMILY MEDICINE | Facility: CLINIC | Age: 69
End: 2024-10-23
Payer: COMMERCIAL

## 2024-10-24 ENCOUNTER — OFFICE VISIT (OUTPATIENT)
Dept: FAMILY MEDICINE | Facility: CLINIC | Age: 69
End: 2024-10-24
Payer: COMMERCIAL

## 2024-10-24 VITALS
HEIGHT: 63 IN | HEART RATE: 60 BPM | OXYGEN SATURATION: 100 % | TEMPERATURE: 97.8 F | BODY MASS INDEX: 20.4 KG/M2 | RESPIRATION RATE: 16 BRPM | WEIGHT: 115.12 LBS | SYSTOLIC BLOOD PRESSURE: 139 MMHG | DIASTOLIC BLOOD PRESSURE: 85 MMHG

## 2024-10-24 DIAGNOSIS — I70.0 ATHEROSCLEROSIS OF AORTA (H): ICD-10-CM

## 2024-10-24 DIAGNOSIS — E78.5 DYSLIPIDEMIA, GOAL LDL BELOW 70: ICD-10-CM

## 2024-10-24 DIAGNOSIS — Z00.00 WELLNESS EXAMINATION: Primary | ICD-10-CM

## 2024-10-24 DIAGNOSIS — Z23 HIGH PRIORITY FOR 2019-NCOV VACCINE: ICD-10-CM

## 2024-10-24 DIAGNOSIS — I72.3 COMMON ILIAC ANEURYSM (H): ICD-10-CM

## 2024-10-24 DIAGNOSIS — J43.9 PULMONARY EMPHYSEMA, UNSPECIFIED EMPHYSEMA TYPE (H): ICD-10-CM

## 2024-10-24 DIAGNOSIS — F17.210 CIGARETTE NICOTINE DEPENDENCE WITHOUT COMPLICATION: ICD-10-CM

## 2024-10-24 DIAGNOSIS — Z00.00 ENCOUNTER FOR MEDICARE ANNUAL WELLNESS EXAM: Primary | ICD-10-CM

## 2024-10-24 DIAGNOSIS — C34.12 MALIGNANT NEOPLASM OF UPPER LOBE OF LEFT LUNG (H): ICD-10-CM

## 2024-10-24 DIAGNOSIS — Z23 NEED FOR PROPHYLACTIC VACCINATION AND INOCULATION AGAINST INFLUENZA: ICD-10-CM

## 2024-10-24 LAB
ERYTHROCYTE [DISTWIDTH] IN BLOOD BY AUTOMATED COUNT: 12.6 % (ref 10–15)
HCT VFR BLD AUTO: 41.7 % (ref 40–53)
HGB BLD-MCNC: 13.4 G/DL (ref 13.3–17.7)
MCH RBC QN AUTO: 31 PG (ref 26.5–33)
MCHC RBC AUTO-ENTMCNC: 32.1 G/DL (ref 31.5–36.5)
MCV RBC AUTO: 97 FL (ref 78–100)
PLATELET # BLD AUTO: 296 10E3/UL (ref 150–450)
RBC # BLD AUTO: 4.32 10E6/UL (ref 4.4–5.9)
WBC # BLD AUTO: 8.9 10E3/UL (ref 4–11)

## 2024-10-24 PROCEDURE — 90480 ADMN SARSCOV2 VAC 1/ONLY CMP: CPT | Performed by: FAMILY MEDICINE

## 2024-10-24 PROCEDURE — 36415 COLL VENOUS BLD VENIPUNCTURE: CPT | Performed by: FAMILY MEDICINE

## 2024-10-24 PROCEDURE — G0439 PPPS, SUBSEQ VISIT: HCPCS | Performed by: FAMILY MEDICINE

## 2024-10-24 PROCEDURE — 80053 COMPREHEN METABOLIC PANEL: CPT | Performed by: FAMILY MEDICINE

## 2024-10-24 PROCEDURE — 90662 IIV NO PRSV INCREASED AG IM: CPT | Performed by: FAMILY MEDICINE

## 2024-10-24 PROCEDURE — 85027 COMPLETE CBC AUTOMATED: CPT | Performed by: FAMILY MEDICINE

## 2024-10-24 PROCEDURE — 90471 IMMUNIZATION ADMIN: CPT | Performed by: FAMILY MEDICINE

## 2024-10-24 PROCEDURE — 99207 PR NO BILLABLE SERVICE THIS VISIT: CPT

## 2024-10-24 PROCEDURE — 91320 SARSCV2 VAC 30MCG TRS-SUC IM: CPT | Performed by: FAMILY MEDICINE

## 2024-10-24 RX ORDER — ATORVASTATIN CALCIUM 40 MG/1
40 TABLET, FILM COATED ORAL DAILY
Qty: 90 TABLET | Refills: 3 | Status: SHIPPED | OUTPATIENT
Start: 2024-10-24

## 2024-10-24 RX ORDER — ASPIRIN 81 MG/1
81 TABLET ORAL DAILY
Qty: 30 TABLET | Refills: 11 | Status: SHIPPED | OUTPATIENT
Start: 2024-10-24

## 2024-10-24 SDOH — HEALTH STABILITY: PHYSICAL HEALTH: ON AVERAGE, HOW MANY DAYS PER WEEK DO YOU ENGAGE IN MODERATE TO STRENUOUS EXERCISE (LIKE A BRISK WALK)?: 7 DAYS

## 2024-10-24 ASSESSMENT — SOCIAL DETERMINANTS OF HEALTH (SDOH)
HOW OFTEN DO YOU GET TOGETHER WITH FRIENDS OR RELATIVES?: ONCE A WEEK
HOW OFTEN DO YOU GET TOGETHER WITH FRIENDS OR RELATIVES?: ONCE A WEEK

## 2024-10-24 ASSESSMENT — PAIN SCALES - GENERAL: PAINLEVEL_OUTOF10: MODERATE PAIN (5)

## 2024-10-24 NOTE — PROGRESS NOTES
Preventive Care Visit  [facility]  Erika DO Eugenio, Family Medicine  Oct 24, 2024      Assessment & Plan     Encounter for Medicare annual wellness exam  He will get his RSV, Shingles And Td vaccine at pharmacy due to insurance  - Dental Referral    Dyslipidemia, goal LDL below 70  - atorvastatin (LIPITOR) 40 MG tablet  Dispense: 90 tablet; Refill: 3    Need for prophylactic vaccination and inoculation against influenza  - INFLUENZA HIGH DOSE, TRIVALENT, PF (FLUZONE)    High priority for 2019-nCoV vaccine  - COVID-19 12+ (PFIZER)    Cigarette nicotine dependence without complication  Pulmonary emphysema, unspecified emphysema type (H)  He does continue to smoke a few cigarettes per day  - Comprehensive metabolic panel  - CBC with platelets    Malignant neoplasm of upper lobe of left lung (H)  He was seen by radiation oncology on 9-4-2024. He will follow with them again in 6 months. His last CT scan did not show any recurrence.    Common iliac aneurysm (H)  Was to have appointment with vascular surgery but this has not yet been scheduled  - US Aorta/Ivc/Iliac Duplex Complete    Atherosclerosis of aorta (H)  - atorvastatin (LIPITOR) 40 MG tablet  Dispense: 90 tablet; Refill: 3  - aspirin 81 MG EC tablet  Dispense: 30 tablet; Refill: 11              Counseling  Appropriate preventive services were addressed with this patient via screening, questionnaire, or discussion as appropriate for fall prevention, nutrition, physical activity, Tobacco-use cessation, social engagement, weight loss and cognition.  Checklist reviewing preventive services available has been given to the patient.  Reviewed patient's diet, addressing concerns and/or questions.   The patient was instructed to see the dentist every 6 months.   The patient was provided with written information regarding signs of hearing loss.   Information on urinary incontinence and treatment options given to patient.           No  follow-ups on file.    González Stephen is a 69 year old, presenting for the following:  Wellness Visit, Syncope (08/21/2024, evaluated at [facility]), Imm/Inj (Flu Shot), and Imm/Inj (COVID-19 VACCINE)        10/24/2024     2:14 PM   Additional Questions   Roomed by Anamika   Accompanied by Partnered, Myesha Fitch         10/24/2024   Forms   Any forms needing to be completed Yes          10/24/2024     2:16 PM   Patient Reported Additional Medications   Patient reports taking the following new medications Denies         10/24/2024    Information    services provided? No              HPI    Multiple chronic conditions:    History of lung cancer: Has oncology appointment in 3/2025. He does continue to smoke (but only 1-2 cigarettes per day). He has COPD but has been stable with inhalers.     ED visit in 8/2024: was found down at his apartment. Appeared to be due to alcohol intoxication on a hot day.     Has been evaluated by vascular surgery. Has significant atherosclerosis of the aorta. He is on aspirin and statin. He is due soon for a repeat ultrasound of iliac arteries due to dilation.    He does worry about his medical conditions and may not fully grasp his conditions. .         Health Care Directive  Patient does not have a Health Care Directive: Patient states has Advance Directive and will bring in a copy to clinic.      10/24/2024   General Health   How would you rate your overall physical health? Good   Feel stress (tense, anxious, or unable to sleep) Not at all            10/24/2024   Nutrition   Diet: I don't know            10/24/2024   Exercise   Days per week of moderate/strenous exercise 7 days            10/24/2024   Social Factors   Frequency of gathering with friends or relatives Once a week   Worry food won't last until get money to buy more No   Food not last or not have enough money for food? No   Do you have housing? (Housing is defined as stable permanent  housing and does not include staying ouside in a car, in a tent, in an abandoned building, in an overnight shelter, or couch-surfing.) Yes   Are you worried about losing your housing? Yes   Lack of transportation? No   Unable to get utilities (heat,electricity)? No   Want help with housing or utility concern? (!) YES      (!) HOUSING CONCERN PRESENT      10/24/2024   Fall Risk   Fallen 2 or more times in the past year? No    Trouble with walking or balance? No        Patient-reported          10/24/2024   Activities of Daily Living- Home Safety   Needs help with the following daily activites None of the above   Safety concerns in the home None of the above            10/24/2024   Dental   Dentist two times every year? (!) NO            10/24/2024   Hearing Screening   Hearing concerns? (!) IT'S HARD TO FOLLOW A CONVERSATION IN A NOISY RESTAURANT OR CROWDED ROOM.            10/24/2024   Driving Risk Screening   Patient/family members have concerns about driving No            10/24/2024   General Alertness/Fatigue Screening   Have you been more tired than usual lately? No            10/24/2024   Urinary Incontinence Screening   Bothered by leaking urine in past 6 months Yes            10/24/2024   TB Screening   Were you born outside of the [place]? No            Today's PHQ-2 Score:       10/24/2024     2:00 PM   PHQ-2 ( 1999 [employer])   Q1: Little interest or pleasure in doing things 0    Q2: Feeling down, depressed or hopeless 0    PHQ-2 Score 0    Q1: Little interest or pleasure in doing things Not at all   Q2: Feeling down, depressed or hopeless Not at all   PHQ-2 Score 0       Patient-reported             10/24/2024   Substance Use   Alcohol more than 3/day or more than 7/wk Not Applicable   Do you have a current opioid prescription? No   How severe/bad is pain from 1 to 10? 5/10   Do you use any other substances recreationally? (!) CANNABIS PRODUCTS        Social History     Tobacco Use    Smoking status: Former      Current packs/day: 0.00     Average packs/day: 1 pack/day for 52.0 years (52.0 ttl pk-yrs)     Types: Cigarettes     Quit date: 2024     Years since quittin.3     Passive exposure: Past    Smokeless tobacco: Never    Tobacco comments:      Quit Date was 2024 Eulalio PERKINS   Vaping Use    Vaping status: Never Used   Substance Use Topics    Alcohol use: No    Drug use: Yes     Frequency: 2.0 times per week     Types: Marijuana       Last PSA:   PSA   Date Value Ref Range Status   10/23/2020 1.1 0.0 - 4.5 ng/mL Final     ASCVD Risk   The 10-year ASCVD risk score (Mary FERMIN, et al., 2019) is: 15.8%    Values used to calculate the score:      Age: 69 years      Sex: Male      Is Non- : No      Diabetic: No      Tobacco smoker: No      Systolic Blood Pressure: 139 mmHg      Is BP treated: No      HDL Cholesterol: 47 mg/dL      Total Cholesterol: 130 mg/dL            Reviewed and updated as needed this visit by Provider   Tobacco  Allergies  Meds  Problems  Med Hx  Surg Hx  Fam Hx     Sexual Activity            Current providers sharing in care for this patient include:  Patient Care Team:  Zora Strong MD as PCP - General (Student in organized health care education/training program)  Janessa Yanes MD as MD (Radiation Oncology)  Janessa Yanes MD as Assigned Cancer Care Provider  Lex Joshi MD as Assigned Heart and Vascular Provider  Zora Strong MD as Assigned PCP    The following health maintenance items are reviewed in Epic and correct as of today:  Health Maintenance   Topic Date Due    COPD ACTION PLAN  Never done    ZOSTER IMMUNIZATION (1 of 2) Never done    RSV VACCINE (1 - Risk 60-74 years 1-dose series) Never done    DTAP/TDAP/TD IMMUNIZATION (3 - Td or Tdap) 10/19/2022    COVID-19 Vaccine ( -  season) 2024    LIPID  2025    MEDICARE ANNUAL WELLNESS VISIT  10/24/2025    FALL RISK ASSESSMENT  10/24/2025    GLUCOSE  10/24/2027     "COLORECTAL CANCER SCREENING  08/16/2028    ADVANCE CARE PLANNING  10/24/2029    SPIROMETRY  Completed    HEPATITIS C SCREENING  Completed    PHQ-2 (once per calendar year)  Completed    INFLUENZA VACCINE  Completed    Pneumococcal Vaccine: 65+ Years  Completed    AORTIC ANEURYSM SCREENING (SYSTEM ASSIGNED)  Completed    HPV IMMUNIZATION  Aged Out    MENINGITIS IMMUNIZATION  Aged Out    RSV MONOCLONAL ANTIBODY  Aged Out    LUNG CANCER SCREENING  Discontinued            Objective    Exam  /85 (BP Location: Left arm, Patient Position: Sitting, Cuff Size: Adult Regular)   Pulse 60   Temp 97.8  F (36.6  C) (Oral)   Resp 16   Ht 1.588 m (5' 2.5\")   Wt 52.2 kg (115 lb 1.9 oz)   SpO2 100%   BMI 20.72 kg/m     Estimated body mass index is 20.72 kg/m  as calculated from the following:    Height as of this encounter: 1.588 m (5' 2.5\").    Weight as of this encounter: 52.2 kg (115 lb 1.9 oz).    Physical Exam  GENERAL: alert and no distress  HENT: Narrow ear canals bilateral. TM's normal, nose and mouth without ulcers or lesions, no upper teeth  RESP: lungs clear to auscultation - no rales, rhonchi or wheezes  MS: no gross musculoskeletal defects noted, no edema        10/24/2024   Mini Cog   Clock Draw Score 0 Abnormal   3 Item Recall 2 objects recalled   Mini Cog Total Score 2                 Signed Electronically by: Erika Becker DO    "

## 2024-10-24 NOTE — LETTER
October 24, 2024      Zafar Spencer  [address]  [facility] [address]        Dear Zafar,    ***    Sincerely,    Erika Becker, DO

## 2024-10-24 NOTE — NURSING NOTE
Syncope episode earlier this year, evaluated by [facility]  Would like ears checked today  Declined vaccinations  Would like A1C is appropriate, last glucose was elevated  Needs [employer] letter for cat  Advance directive completed, will provide copy  Eulalio PERKINS

## 2024-10-24 NOTE — LETTER
October 25, 2024           Preferred management services  Maria Guadalupe Napoleon  fax [phone]  Phone [phone]       Re: Zafar Spencer  [address]  [address]  [facility] [address]     Letter for emotional support animal:     Zafar is under my care for his diagnosis of mood instability.  I would like to advocate for the approval of his emotional support animal ([employer]OKO), a cat named Pumpkin. This cat is a stray with an unknown vaccine status    Emotional support animals can provide critical assistance to individuals dealing with mental health challenges.  The presence of his cat offers comfort, companionship, and a sense of stability, which are essential for Zafar's overall well-being.    Research supports the benefits of emotional support animals in alleviating symptoms of anxiety and emotional instability.     I kindly request your understanding and consideration in allowing Zafar to keep Pumpkin as an emotional support animal. This accommodation will significantly enhance his quality of life and contribute positively to his mental health journey.    Thank you for your attention to this important matter. Should you have any questions or require further information, please feel free to contact me.        Sincerely,           Erika Becker, DO

## 2024-10-24 NOTE — PATIENT INSTRUCTIONS
Get your Tetanus, Shingles and RSV vaccine at the pharmacy!    Patient Education   Preventive Care Advice   This is general advice given by our system to help you stay healthy. However, your care team may have specific advice just for you. Please talk to your care team about your preventive care needs.  Nutrition  Eat 5 or more servings of fruits and vegetables each day.  Try wheat bread, brown rice and whole grain pasta (instead of white bread, rice, and pasta).  Get enough calcium and vitamin D. Check the label on foods and aim for 100% of the RDA (recommended daily allowance).  Lifestyle  Exercise at least 150 minutes each week  (30 minutes a day, 5 days a week).  Do muscle strengthening activities 2 days a week. These help control your weight and prevent disease.  No smoking.  Wear sunscreen to prevent skin cancer.  Have a dental exam and cleaning every 6 months.  Yearly exams  See your health care team every year to talk about:  Any changes in your health.  Any medicines your care team has prescribed.  Preventive care, family planning, and ways to prevent chronic diseases.  Shots (vaccines)   HPV shots (up to age 26), if you've never had them before.  Hepatitis B shots (up to age 59), if you've never had them before.  COVID-19 shot: Get this shot when it's due.  Flu shot: Get a flu shot every year.  Tetanus shot: Get a tetanus shot every 10 years.  Pneumococcal, hepatitis A, and RSV shots: Ask your care team if you need these based on your risk.  Shingles shot (for age 50 and up)  General health tests  Diabetes screening:  Starting at age 35, Get screened for diabetes at least every 3 years.  If you are younger than age 35, ask your care team if you should be screened for diabetes.  Cholesterol test: At age 39, start having a cholesterol test every 5 years, or more often if advised.  Bone density scan (DEXA): At age 50, ask your care team if you should have this scan for osteoporosis (brittle bones).  Hepatitis  C: Get tested at least once in your life.  STIs (sexually transmitted infections)  Before age 24: Ask your care team if you should be screened for STIs.  After age 24: Get screened for STIs if you're at risk. You are at risk for STIs (including HIV) if:  You are sexually active with more than one person.  You don't use condoms every time.  You or a partner was diagnosed with a sexually transmitted infection.  If you are at risk for HIV, ask about PrEP medicine to prevent HIV.  Get tested for HIV at least once in your life, whether you are at risk for HIV or not.  Cancer screening tests  Cervical cancer screening: If you have a cervix, begin getting regular cervical cancer screening tests starting at age 21.  Breast cancer scan (mammogram): If you've ever had breasts, begin having regular mammograms starting at age 40. This is a scan to check for breast cancer.  Colon cancer screening: It is important to start screening for colon cancer at age 45.  Have a colonoscopy test every 10 years (or more often if you're at risk) Or, ask your provider about stool tests like a FIT test every year or Cologuard test every 3 years.  To learn more about your testing options, visit:   .  For help making a decision, visit:   https://bit.ly/yw06645.  Prostate cancer screening test: If you have a prostate, ask your care team if a prostate cancer screening test (PSA) at age 55 is right for you.  Lung cancer screening: If you are a current or former smoker ages 50 to 80, ask your care team if ongoing lung cancer screenings are right for you.  For informational purposes only. Not to replace the advice of your health care provider. Copyright   2023 [employer]. All rights reserved. Clinically reviewed by the [facility]. [employer] 733496 - REV 01/24.  Hearing Loss: Care Instructions  Overview     Hearing loss is a sudden or slow decrease in how well you hear. It can range from slight to profound.  Permanent hearing loss can occur with aging. It also can happen when you are exposed long-term to loud noise. Examples include listening to loud music, riding motorcycles, or being around other loud machines.  Hearing loss can affect your work and home life. It can make you feel lonely or depressed. You may feel that you have lost your independence. But hearing aids and other devices can help you hear better and feel connected to others.  Follow-up care is a key part of your treatment and safety. Be sure to make and go to all appointments, and call your doctor if you are having problems. It's also a good idea to know your test results and keep a list of the medicines you take.  How can you care for yourself at home?  Avoid loud noises whenever possible. This helps keep your hearing from getting worse.  Always wear hearing protection around loud noises.  Wear a hearing aid as directed.  A professional can help you pick a hearing aid that will work best for you.  You can also get hearing aids over the counter for mild to moderate hearing loss.  Have hearing tests as your doctor suggests. They can show whether your hearing has changed. Your hearing aid may need to be adjusted.  Use other devices as needed. These may include:  Telephone amplifiers and hearing aids that can connect to a television, stereo, radio, or microphone.  Devices that use lights or vibrations. These alert you to the doorbell, a ringing telephone, or a baby monitor.  Television closed-captioning. This shows the words at the bottom of the screen. Most new TVs can do this.  TTY (text telephone). This lets you type messages back and forth on the telephone instead of talking or listening. These devices are also called TDD. When messages are typed on the keyboard, they are sent over the phone line to a receiving TTY. The message is shown on a monitor.  Use text messaging, social media, and email if it is hard for you to communicate by telephone.  Try to  "learn a listening technique called speechreading. It is not lipreading. You pay attention to people's gestures, expressions, posture, and tone of voice. These clues can help you understand what a person is saying. Face the person you are talking to, and have them face you. Make sure the lighting is good. You need to see the other person's face clearly.  Think about counseling if you need help to adjust to your hearing loss.  When should you call for help?  Watch closely for changes in your health, and be sure to contact your doctor if:    You think your hearing is getting worse.     You have new symptoms, such as dizziness or nausea.   Where can you learn more?  Go to https://www.CoPatient.net/patiented  Enter R798 in the search box to learn more about \"Hearing Loss: Care Instructions.\"  Current as of: September 27, 2023  Content Version: 14.2 2024 [facility] [employer].   Care instructions adapted under license by your healthcare professional. If you have questions about a medical condition or this instruction, always ask your healthcare professional. [employer] disclaims any warranty or liability for your use of this information.    Bladder Training: Care Instructions  Your Care Instructions     Bladder training is used to treat urge incontinence and stress incontinence. Urge incontinence means that the need to urinate comes on so fast that you can't get to a toilet in time. Stress incontinence means that you leak urine because of pressure on your bladder. For example, it may happen when you laugh, cough, or lift something heavy.  Bladder training can increase how long you can wait before you have to urinate. It can also help your bladder hold more urine. And it can give you better control over the urge to urinate.  It is important to remember that bladder training takes a few weeks to a few months to make a difference. You may not see results right away, but don't give up.  Follow-up care is a " key part of your treatment and safety. Be sure to make and go to all appointments, and call your doctor if you are having problems. It's also a good idea to know your test results and keep a list of the medicines you take.  How can you care for yourself at home?  Work with your doctor to come up with a bladder training program that is right for you. You may use one or more of the following methods.  Delayed urination  In the beginning, try to keep from urinating for 5 minutes after you first feel the need to go.  While you wait, take deep, slow breaths to relax. Kegel exercises can also help you delay the need to go to the bathroom.  After some practice, when you can easily wait 5 minutes to urinate, try to wait 10 minutes before you urinate.  Slowly increase the waiting period until you are able to control when you have to urinate.  Scheduled urination  Empty your bladder when you first wake up in the morning.  Schedule times throughout the day when you will urinate.  Start by going to the bathroom every hour, even if you don't need to go.  Slowly increase the time between trips to the bathroom.  When you have found a schedule that works well for you, keep doing it.  If you wake up during the night and have to urinate, do it. Apply your schedule to waking hours only.  Kegel exercises  These tighten and strengthen pelvic muscles, which can help you control the flow of urine. (If doing these exercises causes pain, stop doing them and talk with your doctor.) To do Kegel exercises:  Squeeze your muscles as if you were trying not to pass gas. Or squeeze your muscles as if you were stopping the flow of urine. Your belly, legs, and buttocks shouldn't move.  Hold the squeeze for 3 seconds, then relax for 5 to 10 seconds.  Start with 3 seconds, then add 1 second each week until you are able to squeeze for 10 seconds.  Repeat the exercise 10 times a session. Do 3 to 8 sessions a day.  When should you call for help?  Watch  "closely for changes in your health, and be sure to contact your doctor if:    Your incontinence is getting worse.     You do not get better as expected.   Where can you learn more?  Go to https://www.Rix[employer].net/patiented  Enter V684 in the search box to learn more about \"Bladder Training: Care Instructions.\"  Current as of: November 15, 2023  Content Version: 14.2 2024 [employer].   Care instructions adapted under license by your healthcare professional. If you have questions about a medical condition or this instruction, always ask your healthcare professional. Healthwise, Incorporated disclaims any warranty or liability for your use of this information.    Substance Use Disorder: Care Instructions  Overview     You can improve your life and health by stopping your use of alcohol or drugs. When you don't drink or use drugs, you may feel and sleep better. You may get along better with your family, friends, and coworkers. There are medicines and programs that can help with substance use disorder.  How can you care for yourself at home?  Here are some ways to help you stay sober and prevent relapse.  If you have been given medicine to help keep you sober or reduce your cravings, be sure to take it exactly as prescribed.  Talk to your doctor about programs that can help you stop using drugs or drinking alcohol.  Do not keep alcohol or drugs in your home.  Plan ahead. Think about what you'll say if other people ask you to drink or use drugs. Try not to spend time with people who drink or use drugs.  Use the time and money spent on drinking or drugs to do something that's important to you.  Preventing a relapse  Have a plan to deal with relapse. Learn to recognize changes in your thinking that lead you to drink or use drugs. Get help before you start to drink or use drugs again.  Try to stay away from situations, friends, or places that may lead you to drink or use drugs.  If you feel the need to drink " alcohol or use drugs again, seek help right away. Call a trusted friend or family member. Some people get support from organizations such as Narcotics Anonymous or [employer] or from treatment facilities.  If you relapse, get help as soon as you can. Some people make a plan with another person that outlines what they want that person to do for them if they relapse. The plan usually includes how to handle the relapse and who to notify in case of relapse.  Don't give up. Remember that a relapse doesn't mean that you have failed. Use the experience to learn the triggers that lead you to drink or use drugs. Then quit again. Recovery is a lifelong process. Many people have several relapses before they are able to quit for good.  Follow-up care is a key part of your treatment and safety. Be sure to make and go to all appointments, and call your doctor if you are having problems. It's also a good idea to know your test results and keep a list of the medicines you take.  When should you call for help?   Call 911  anytime you think you may need emergency care. For example, call if you or someone else:    Has overdosed or has withdrawal signs. Be sure to tell the emergency workers that you are or someone else is using or trying to quit using drugs. Overdose or withdrawal signs may include:  Losing consciousness.  Seizure.  Seeing or hearing things that aren't there (hallucinations).     Is thinking or talking about suicide or harming others.   Where to get help 24 hours a day, 7 days a week   If you or someone you know talks about suicide, self-harm, a mental health crisis, a substance use crisis, or any other kind of emotional distress, get help right away. You can:    Call the Suicide and Crisis Lifeline at 414.     Call 3-261-255-TALK (1-954.684.9917).     Text HOME to 751772 to access the Crisis Text Line.   Consider saving these numbers in your phone.  Go to [facility] [facility] for more information or to chat  "online.  Call your doctor now or seek immediate medical care if:    You are having withdrawal symptoms. These may include nausea or vomiting, sweating, shakiness, and anxiety.   Watch closely for changes in your health, and be sure to contact your doctor if:    You have a relapse.     You need more help or support to stop.   Where can you learn more?  Go to https://www.[employer]net/patiented  Enter H573 in the search box to learn more about \"Substance Use Disorder: Care Instructions.\"  Current as of: November 15, 2023  Content Version: 14.2 2024 [facility] [employer].   Care instructions adapted under license by your healthcare professional. If you have questions about a medical condition or this instruction, always ask your healthcare professional. Healthwise, Incorporated disclaims any warranty or liability for your use of this information.       "

## 2024-10-25 PROBLEM — I70.0 ATHEROSCLEROSIS OF AORTA (H): Status: ACTIVE | Noted: 2024-10-25

## 2024-10-25 LAB
ALBUMIN SERPL BCG-MCNC: 4.6 G/DL (ref 3.5–5.2)
ALP SERPL-CCNC: 70 U/L (ref 40–150)
ALT SERPL W P-5'-P-CCNC: 16 U/L (ref 0–70)
ANION GAP SERPL CALCULATED.3IONS-SCNC: 11 MMOL/L (ref 7–15)
AST SERPL W P-5'-P-CCNC: 30 U/L (ref 0–45)
BILIRUB SERPL-MCNC: 0.5 MG/DL
BUN SERPL-MCNC: 15.7 MG/DL (ref 8–23)
CALCIUM SERPL-MCNC: 10.1 MG/DL (ref 8.8–10.4)
CHLORIDE SERPL-SCNC: 101 MMOL/L (ref 98–107)
CREAT SERPL-MCNC: 1.01 MG/DL (ref 0.67–1.17)
EGFRCR SERPLBLD CKD-EPI 2021: 81 ML/MIN/1.73M2
GLUCOSE SERPL-MCNC: 101 MG/DL (ref 70–99)
HCO3 SERPL-SCNC: 26 MMOL/L (ref 22–29)
POTASSIUM SERPL-SCNC: 4.9 MMOL/L (ref 3.4–5.3)
PROT SERPL-MCNC: 8 G/DL (ref 6.4–8.3)
SODIUM SERPL-SCNC: 138 MMOL/L (ref 135–145)

## 2024-11-07 ENCOUNTER — TELEPHONE (OUTPATIENT)
Dept: RADIATION ONCOLOGY | Facility: HOSPITAL | Age: 69
End: 2024-11-07
Payer: COMMERCIAL

## 2024-11-07 NOTE — TELEPHONE ENCOUNTER
Letter sent to patient regarding insurance changes for [facility] in 2025.  [facility] will no longer be taking the patient [employer] Medicare Advantage insurance as of 1/1/25. Pt is scheduled to see Dr. Yanes in [facility] on 3/7/25 with a CT on 3/5/25. Please leave scheduled till we hear back from patient.    CT at [facility] on 3/5/25 and also [facility] on 3/7/25 with Dr. Yanes.

## 2025-01-27 ENCOUNTER — TELEPHONE (OUTPATIENT)
Dept: ONCOLOGY | Facility: HOSPITAL | Age: 70
End: 2025-01-27
Payer: COMMERCIAL

## 2025-01-27 NOTE — TELEPHONE ENCOUNTER
I received a voice message today from Zafar's friend Zain.  He is calling because he is trying to help Zafar get signed up for [employer] insurance.  Per the voice message, Zafar was notified that our site no longer accept his insurance.  He is calling today to get information about how he can assist him.  I reached into [employer], , and she recommends that he call [employer] Medicare directly at [phone].  The other option would be to call senior linkage line to discuss options.  That phone number is [phone].  I attempted to call Zain back but I was unable to reach him.  I left him a voice message on his secure line with these phone numbers.  I encouraged him to call back with any other questions.    Fidelina Hawk RN on 1/27/2025 at 3:18 PM

## 2025-02-26 ENCOUNTER — TELEPHONE (OUTPATIENT)
Dept: FAMILY MEDICINE | Facility: CLINIC | Age: 70
End: 2025-02-26

## 2025-02-26 ENCOUNTER — HOSPITAL ENCOUNTER (EMERGENCY)
Facility: HOSPITAL | Age: 70
Discharge: HOME | End: 2025-02-26
Attending: EMERGENCY MEDICINE | Admitting: EMERGENCY MEDICINE
Payer: COMMERCIAL

## 2025-02-26 VITALS
RESPIRATION RATE: 18 BRPM | DIASTOLIC BLOOD PRESSURE: 78 MMHG | SYSTOLIC BLOOD PRESSURE: 119 MMHG | OXYGEN SATURATION: 96 % | HEART RATE: 68 BPM | WEIGHT: 124 LBS | BODY MASS INDEX: 21.17 KG/M2 | HEIGHT: 64 IN | TEMPERATURE: 98.3 F
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
## 2025-02-26 DIAGNOSIS — S00.31XA ABRASION OF NOSE, INITIAL ENCOUNTER: ICD-10-CM
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
## 2025-02-26 DIAGNOSIS — S00.511A ABRASION OF LIP, INITIAL ENCOUNTER: ICD-10-CM

## 2025-02-26 PROCEDURE — 99284 EMERGENCY DEPT VISIT MOD MDM: CPT | Mod: 25

## 2025-02-26 ASSESSMENT — ACTIVITIES OF DAILY LIVING (ADL): ADLS_ACUITY_SCORE: 41

## 2025-02-26 ASSESSMENT — COLUMBIA-SUICIDE SEVERITY RATING SCALE - C-SSRS
1. IN THE PAST MONTH, HAVE YOU WISHED YOU WERE DEAD OR WISHED YOU COULD GO TO SLEEP AND NOT WAKE UP?: NO
2. HAVE YOU ACTUALLY HAD ANY THOUGHTS OF KILLING YOURSELF IN THE PAST MONTH?: NO
6. HAVE YOU EVER DONE ANYTHING, STARTED TO DO ANYTHING, OR PREPARED TO DO ANYTHING TO END YOUR LIFE?: NO

## 2025-02-26 NOTE — TELEPHONE ENCOUNTER
Myesha Fitch called asking if Dr Strong could call her to recommend a new provider for patient as [facility] no longer accepts his insurance. Please contact her if able at [phone] - thank you

## 2025-02-26 NOTE — ED PROVIDER NOTES
"EMERGENCY DEPARTMENT ENCOUNTER      NAME: Zafar Spencer  AGE: 69 year old male  YOB: 1955  MRN: [id number]  EVALUATION DATE & TIME: No admission date for patient encounter.    PCP: Zora Strong    ED PROVIDER: aJnia Brink M.D.      Chief Complaint   Patient presents with    Fall         FINAL IMPRESSION:  1. Fall at home, initial encounter    2. Abrasion of nose, initial encounter    3. Abrasion of lip, initial encounter          ED COURSE & MEDICAL DECISION MAKING:    ED Course as of 02/26/25 1355   Wed Feb 26, 2025   1244 Patient neuro intact not on blood thinners but on daily ASA 81mg PO and s/p slip and fall on the ice today, with abrasions to his face. GCS 15 and no AMS, patient and wife ok with plans to CT head and face with c-spine at age 69 and on ASA, neuro intact on exam and NO loss of sensation or strength anywhere in body and NISHSS 0, reported to triage RN \"numbness down right leg\" but NO change in sensation at all right vs. Left, no back pain AND ambulating normally and then he revises to note his right calf is \"a little sore after I slipped\" without focal pain or swelling or bruising to suggest DVT reassuringly. Imaging pending stat now.    1354 CT head, c-spine and face without acute traumatic fractures and with some slight sinus fluid, patient without fever and feeling well and intact thus unlikely skull fx. Patient discharged after being provided with extensive anticipatory guidance and given return precautions, importance of PMD follow-up emphasized.        Pertinent Labs & Imaging studies reviewed. (See chart for details)    Medical Decision Making  Obtained supplemental history:Supplemental history obtained?: Documented in chart and Family Member/Significant Other  Reviewed external records: External records reviewed?: Documented in chart  Care impacted by chronic illness:Documented in Chart  Did you consider but not order tests?: Work up considered but not performed and " "documented in chart, if applicable  Did you interpret images independently?: Independent interpretation of ECG and images noted in documentation, when applicable.  Consultation discussion with other provider:Did you involve another provider (consultant, MH, pharmacy, etc.)?: No  Discharge. No recommendations on prescription strength medication(s). I considered admission, but discharged patient after significant clinical improvement.    MIPS (CTPE, Dental pain, Yoder, Sinusitis, Asthma/COPD, Head Trauma): Adult Minor Head Trauma:Age 65 years or older and Currently taking antiplatelet medications: clopidogrel or other antiplatelet medications      At the conclusion of the encounter I discussed the results of all of the tests and the disposition. The questions were answered. The patient or family acknowledged understanding and was agreeable with the care plan.     MEDICATIONS GIVEN IN THE EMERGENCY:  Medications - No data to display    NEW PRESCRIPTIONS STARTED AT TODAY'S ER VISIT  New Prescriptions    No medications on file          =================================================================    HPI      Zafar Spencer is a 69 year old male with PMHx of tobacco dependence and pulmonary nodules who presents to the ED today via private vehicle BIB his wife with a fall.    He reports that around 0900 today he slipped on ice and fell forwards while outside and struck his head on the ground. No loss of consciousness, no vision changes, no neck or back pain, he was able to get up on his own, no vomiting or loss of sensation or strength anywhere, no blood thinner use but he takes aspirin 81mg PO daily. He walked home and into his house and his wife \"saw blood all over his face\" and so he washed his face and she walked with him to the car and then they came to the ED for assessment. He denies any nose pain, any mouth pain or any alcohol or drug use and says he feels well.       REVIEW OF SYSTEMS   All other systems " reviewed and are negative except as noted above in HPI.    PAST MEDICAL HISTORY:  Past Medical History:   Diagnosis Date    Bronchitis     Closed fracture of distal end of right fibula with routine healing, unspecified fracture morphology, subsequent encounter 2021    -Injury on 20  -Seen on 21 -> continue CAM boot, repeat XR in 4 weeks  -Seen on 21 -> needed unemployment paperwork filled out  -[facility] 21 -> XR showed healing well -> wean off CAM boot, PT, repeat XR in 6 weeks  -Seen on 21 -> out of CAM boot, start PT  -Seen on 3/30/21 -> graduated from PT -> ordered XR to confirmed healed  -Seen on 4/15/21 -> XR reviewed (incomplet    Pulmonary nodules     Tobacco dependence        PAST SURGICAL HISTORY:  Past Surgical History:   Procedure Laterality Date    COLONOSCOPY N/A 2018    Procedure: COLONOSCOPY;  Surgeon: Alex Roche III, MD;  Location: formerly [facility];  Service:     OTHER SURGICAL HISTORY      double hernia    OTHER SURGICAL HISTORY      hand (right)       CURRENT MEDICATIONS:    albuterol (PROAIR HFA/PROVENTIL HFA/VENTOLIN HFA) 108 (90 Base) MCG/ACT inhaler  aspirin 81 MG EC tablet  atorvastatin (LIPITOR) 40 MG tablet  ibuprofen (ADVIL/MOTRIN) 600 MG tablet        ALLERGIES:  Allergies   Allergen Reactions    Latex Rash    Penicillins Rash     rash       FAMILY HISTORY:  Family History   Problem Relation Age of Onset    Diabetes Mother     Heart Disease Mother     Hypertension No family hx of     Coronary Artery Disease No family hx of     Cancer No family hx of        SOCIAL HISTORY:   Social History     Socioeconomic History    Marital status: Single   Occupational History    Occupation: [employer]   Tobacco Use    Smoking status: Former     Current packs/day: 0.00     Average packs/day: 1 pack/day for 52.0 years (52.0 ttl pk-yrs)     Types: Cigarettes     Quit date: 2024     Years since quittin.6     Passive exposure: Past    Smokeless tobacco: Never     Tobacco comments:      Quit Date was July 01, 2024 Eulalio PERKINS   Vaping Use    Vaping status: Never Used   Substance and Sexual Activity    Alcohol use: No    Drug use: Yes     Frequency: 2.0 times per week     Types: Marijuana    Sexual activity: Not Currently     Partners: Female     Social Drivers of Health     Financial Resource Strain: Low Risk  (10/24/2024)    Financial Resource Strain     Within the past 12 months, have you or your family members you live with been unable to get utilities (heat, electricity) when it was really needed?: No   Food Insecurity: Low Risk  (10/24/2024)    Food Insecurity     Within the past 12 months, did you worry that your food would run out before you got money to buy more?: No     Within the past 12 months, did the food you bought just not last and you didn t have money to get more?: No   Transportation Needs: Low Risk  (10/24/2024)    Transportation Needs     Within the past 12 months, has lack of transportation kept you from medical appointments, getting your medicines, non-medical meetings or appointments, work, or from getting things that you need?: No   Physical Activity: Unknown (10/24/2024)    Exercise Vital Sign     Days of Exercise per Week: 7 days   Stress: No Stress Concern Present (10/24/2024)    [employer] - Occupational Stress Questionnaire     Feeling of Stress : Not at all   Social Connections: Unknown (10/24/2024)    Social Connection and Isolation Panel [NHANES]     Frequency of Social Gatherings with Friends and Family: Once a week   Interpersonal Safety: Unknown (8/21/2024)    Received from [facility]    Humiliation, Afraid, Rape, and Kick questionnaire     Emotionally Abused: Patient declined     Physically Abused: Patient declined     Sexually Abused: Patient declined   Housing Stability: High Risk (10/24/2024)    Housing Stability     Do you have housing? : Yes     Are you worried about losing your housing?: Yes  "      VITALS:  Patient Vitals for the past 24 hrs:   BP Temp Temp src Pulse Resp SpO2 Height Weight   02/26/25 1231 119/81 98.3  F (36.8  C) Oral 88 18 95 % 1.626 m (5' 4\") 56.2 kg (124 lb)       PHYSICAL EXAM    VITAL SIGNS: /81   Pulse 88   Temp 98.3  F (36.8  C) (Oral)   Resp 18   Ht 1.626 m (5' 4\")   Wt 56.2 kg (124 lb)   SpO2 95%   BMI 21.28 kg/m     GENERAL: Awake, alert.  In no acute distress. GCS 15  HEENT: Nose abrasion. Pupils equal, round and reactive.  Conjunctiva normal.  EOMI. No rodriguez sign, no racoon eyes, no mastoid tenderness, no hemotympanum, no facial instability, no nasal bridge pain, no nasal septal hematoma, no intraoral lacerations, no loose teeth, no mandible pain or deformity. Left lower lip abrasion without laceration or disruption to vermillion border.  NECK: No stridor or apparent deformity. No midline pain to palpation.  PULMONARY: Symmetrical breath sounds without distress.  Lungs clear to auscultation bilaterally without wheezes, rhonchi or rales.  CARDIO: Regular rate and rhythm.  No significant murmur, rub or gallop.  Radial pulses strong and symmetrical.  THORAX: No focal chest wall deformity or crepitus  BACK: No focal tenderness or deformity to each vertebral level in midline  ABDOMINAL: Abdomen soft, non-distended and non-tender to palpation.  No CVAT, BL.  EXTREMITIES: No lower extremity swelling or edema. Pelvis stable and without focal tenderness. Bilateral pedal pulses 2+ and equal.  NEURO: Alert and oriented to person, place and time.  Cranial nerves grossly intact.  No focal motor deficit. Sensation globally intact.  PSYCH: Normal mood and affect  SKIN: No rashes    LAB:  All pertinent labs reviewed and interpreted.  Results for orders placed or performed during the hospital encounter of 02/26/25   CT Head w/o Contrast    Impression    IMPRESSION:  1.  Small lateral right parietal scalp hematoma.  2.  Left mastoid effusion again noted.  3.  Otherwise, normal " head CT.   CT Cervical Spine w/o Contrast    Impression    IMPRESSION:   Mild degenerative anterolisthesis of C7 upon T1. Alignment of the cervical vertebrae is otherwise normal. Vertebral body heights of the cervical spine are normal. Craniocervical alignment is normal. No fractures. There is loss of disc space height and   degenerative endplate spurring at C4-C5 and C5-C6. Mild facet arthropathy throughout the cervical spine. Emphysematous and fibrotic change in the upper lungs bilaterally again noted.   CT Facial Bones without Contrast    Impression    IMPRESSION:   No fractures. The paranasal sinuses are well aerated. The orbits and globes bilaterally are unremarkable. Temporomandibular joint alignment bilaterally is normal. Minimal left mastoid effusion.       RADIOLOGY:  Reviewed all pertinent imaging. Please see official radiology report.  CT Cervical Spine w/o Contrast   Final Result   IMPRESSION:    Mild degenerative anterolisthesis of C7 upon T1. Alignment of the cervical vertebrae is otherwise normal. Vertebral body heights of the cervical spine are normal. Craniocervical alignment is normal. No fractures. There is loss of disc space height and    degenerative endplate spurring at C4-C5 and C5-C6. Mild facet arthropathy throughout the cervical spine. Emphysematous and fibrotic change in the upper lungs bilaterally again noted.      CT Facial Bones without Contrast   Final Result   IMPRESSION:    No fractures. The paranasal sinuses are well aerated. The orbits and globes bilaterally are unremarkable. Temporomandibular joint alignment bilaterally is normal. Minimal left mastoid effusion.      CT Head w/o Contrast   Final Result   IMPRESSION:   1.  Small lateral right parietal scalp hematoma.   2.  Left mastoid effusion again noted.   3.  Otherwise, normal head CT.             Jania Brink MD  02/26/25 [id number]
